# Patient Record
Sex: MALE | Race: OTHER | HISPANIC OR LATINO | URBAN - METROPOLITAN AREA
[De-identification: names, ages, dates, MRNs, and addresses within clinical notes are randomized per-mention and may not be internally consistent; named-entity substitution may affect disease eponyms.]

---

## 2021-09-22 ENCOUNTER — INPATIENT (INPATIENT)
Facility: HOSPITAL | Age: 56
LOS: 5 days | Discharge: HOME | DRG: 308 | End: 2021-09-28
Attending: HOSPITALIST | Admitting: STUDENT IN AN ORGANIZED HEALTH CARE EDUCATION/TRAINING PROGRAM
Payer: COMMERCIAL

## 2021-09-22 VITALS
RESPIRATION RATE: 18 BRPM | HEART RATE: 120 BPM | HEIGHT: 70 IN | DIASTOLIC BLOOD PRESSURE: 99 MMHG | TEMPERATURE: 98 F | SYSTOLIC BLOOD PRESSURE: 159 MMHG | OXYGEN SATURATION: 97 % | WEIGHT: 237 LBS

## 2021-09-22 LAB
ALBUMIN SERPL ELPH-MCNC: 3.8 G/DL — SIGNIFICANT CHANGE UP (ref 3.3–5)
ALP SERPL-CCNC: 122 U/L — HIGH (ref 40–120)
ALT FLD-CCNC: 41 U/L — SIGNIFICANT CHANGE UP (ref 10–45)
ANION GAP SERPL CALC-SCNC: 13 MMOL/L — SIGNIFICANT CHANGE UP (ref 5–17)
AST SERPL-CCNC: 30 U/L — SIGNIFICANT CHANGE UP (ref 10–40)
BASOPHILS # BLD AUTO: 0.03 K/UL — SIGNIFICANT CHANGE UP (ref 0–0.2)
BASOPHILS NFR BLD AUTO: 0.5 % — SIGNIFICANT CHANGE UP (ref 0–2)
BILIRUB SERPL-MCNC: 0.7 MG/DL — SIGNIFICANT CHANGE UP (ref 0.2–1.2)
BUN SERPL-MCNC: 20 MG/DL — SIGNIFICANT CHANGE UP (ref 7–23)
CALCIUM SERPL-MCNC: 8.8 MG/DL — SIGNIFICANT CHANGE UP (ref 8.4–10.5)
CHLORIDE SERPL-SCNC: 106 MMOL/L — SIGNIFICANT CHANGE UP (ref 96–108)
CO2 SERPL-SCNC: 22 MMOL/L — SIGNIFICANT CHANGE UP (ref 22–31)
CREAT SERPL-MCNC: 1.25 MG/DL — SIGNIFICANT CHANGE UP (ref 0.5–1.3)
D DIMER BLD IA.RAPID-MCNC: 652 NG/ML DDU — HIGH
EOSINOPHIL # BLD AUTO: 0.11 K/UL — SIGNIFICANT CHANGE UP (ref 0–0.5)
EOSINOPHIL NFR BLD AUTO: 1.7 % — SIGNIFICANT CHANGE UP (ref 0–6)
GLUCOSE SERPL-MCNC: 112 MG/DL — HIGH (ref 70–99)
HCT VFR BLD CALC: 45 % — SIGNIFICANT CHANGE UP (ref 39–50)
HGB BLD-MCNC: 14.4 G/DL — SIGNIFICANT CHANGE UP (ref 13–17)
IMM GRANULOCYTES NFR BLD AUTO: 0.3 % — SIGNIFICANT CHANGE UP (ref 0–1.5)
LIDOCAIN IGE QN: 31 U/L — SIGNIFICANT CHANGE UP (ref 7–60)
LYMPHOCYTES # BLD AUTO: 1.59 K/UL — SIGNIFICANT CHANGE UP (ref 1–3.3)
LYMPHOCYTES # BLD AUTO: 24.7 % — SIGNIFICANT CHANGE UP (ref 13–44)
MAGNESIUM SERPL-MCNC: 2 MG/DL — SIGNIFICANT CHANGE UP (ref 1.6–2.6)
MCHC RBC-ENTMCNC: 29.6 PG — SIGNIFICANT CHANGE UP (ref 27–34)
MCHC RBC-ENTMCNC: 32 GM/DL — SIGNIFICANT CHANGE UP (ref 32–36)
MCV RBC AUTO: 92.4 FL — SIGNIFICANT CHANGE UP (ref 80–100)
MONOCYTES # BLD AUTO: 0.44 K/UL — SIGNIFICANT CHANGE UP (ref 0–0.9)
MONOCYTES NFR BLD AUTO: 6.8 % — SIGNIFICANT CHANGE UP (ref 2–14)
NEUTROPHILS # BLD AUTO: 4.26 K/UL — SIGNIFICANT CHANGE UP (ref 1.8–7.4)
NEUTROPHILS NFR BLD AUTO: 66 % — SIGNIFICANT CHANGE UP (ref 43–77)
NRBC # BLD: 0 /100 WBCS — SIGNIFICANT CHANGE UP (ref 0–0)
NT-PROBNP SERPL-SCNC: 1636 PG/ML — HIGH (ref 0–300)
PLATELET # BLD AUTO: 249 K/UL — SIGNIFICANT CHANGE UP (ref 150–400)
POTASSIUM SERPL-MCNC: 3.9 MMOL/L — SIGNIFICANT CHANGE UP (ref 3.5–5.3)
POTASSIUM SERPL-SCNC: 3.9 MMOL/L — SIGNIFICANT CHANGE UP (ref 3.5–5.3)
PROT SERPL-MCNC: 6.3 G/DL — SIGNIFICANT CHANGE UP (ref 6–8.3)
RBC # BLD: 4.87 M/UL — SIGNIFICANT CHANGE UP (ref 4.2–5.8)
RBC # FLD: 14.2 % — SIGNIFICANT CHANGE UP (ref 10.3–14.5)
SARS-COV-2 RNA SPEC QL NAA+PROBE: SIGNIFICANT CHANGE UP
SODIUM SERPL-SCNC: 141 MMOL/L — SIGNIFICANT CHANGE UP (ref 135–145)
TROPONIN T, HIGH SENSITIVITY RESULT: 18 NG/L — SIGNIFICANT CHANGE UP (ref 0–51)
TROPONIN T, HIGH SENSITIVITY RESULT: 19 NG/L — SIGNIFICANT CHANGE UP (ref 0–51)
WBC # BLD: 6.45 K/UL — SIGNIFICANT CHANGE UP (ref 3.8–10.5)
WBC # FLD AUTO: 6.45 K/UL — SIGNIFICANT CHANGE UP (ref 3.8–10.5)

## 2021-09-22 PROCEDURE — 93010 ELECTROCARDIOGRAM REPORT: CPT | Mod: NC

## 2021-09-22 PROCEDURE — 99285 EMERGENCY DEPT VISIT HI MDM: CPT

## 2021-09-22 PROCEDURE — 71275 CT ANGIOGRAPHY CHEST: CPT | Mod: 26,MA

## 2021-09-22 PROCEDURE — 71046 X-RAY EXAM CHEST 2 VIEWS: CPT | Mod: 26

## 2021-09-22 RX ORDER — DILTIAZEM HCL 120 MG
20 CAPSULE, EXT RELEASE 24 HR ORAL ONCE
Refills: 0 | Status: COMPLETED | OUTPATIENT
Start: 2021-09-22 | End: 2021-09-22

## 2021-09-22 RX ORDER — ASPIRIN/CALCIUM CARB/MAGNESIUM 324 MG
324 TABLET ORAL ONCE
Refills: 0 | Status: COMPLETED | OUTPATIENT
Start: 2021-09-22 | End: 2021-09-22

## 2021-09-22 RX ORDER — DILTIAZEM HCL 120 MG
60 CAPSULE, EXT RELEASE 24 HR ORAL ONCE
Refills: 0 | Status: COMPLETED | OUTPATIENT
Start: 2021-09-22 | End: 2021-09-22

## 2021-09-22 RX ADMIN — Medication 324 MILLIGRAM(S): at 18:36

## 2021-09-22 RX ADMIN — Medication 20 MILLIGRAM(S): at 18:36

## 2021-09-22 RX ADMIN — Medication 60 MILLIGRAM(S): at 19:05

## 2021-09-22 NOTE — ED PROVIDER NOTE - OBJECTIVE STATEMENT
Attn- pt seen in [facility] - pt sent to ER by Lexi today. pt c/o palpitations intermittently with TAYLOR for 3 weeks.  no chest pain, no leg pain. no family hx of CAD.  pt smokes on weekends and drinks 12 pack of beer on weekends.  pt hasn't seen physician for many years.  no known pmhx.

## 2021-09-22 NOTE — ED PROVIDER NOTE - CLINICAL SUMMARY MEDICAL DECISION MAKING FREE TEXT BOX
Attn - new onset AFib - pt with c/o intermittent palp - poss paroxysmal.  for 3 weeks - D-Dimer poss PE vs rate related.  CXR, Trop, rate control, anticoag.

## 2021-09-22 NOTE — ED ADULT NURSE NOTE - OBJECTIVE STATEMENT
55yM presents to the ED from home with complaints of increased TAYLOR. No significant PMHx or daily medications (hasn't seen a physician in a while). [id number] of worsening TAYLOR and BL LE swelling. Denies any CP, N/V, abdominal pain, back pain, fevers/chills, numbness/tingling, HA, vision changes, diaphoresis. Pt seen at  and referred to ED for further eval. Upon arrival to ED, pt AAOx4, VS as documented. EKG completed showing new onset afib with rate in 120's. Spo2 97% on RA. 2IV's placed, labs drawn and sent, pt placed on cardiac and spo2 monitor. Medications given as per MD order. Pt lungs clear BL. Pt has no increased WOB, labored respirations, or retractions. Pt abdomen soft and nontender to palpation. Pt has + pulses in UE and LE BL. Pt has +2 edema of BL LE. Pt able to ambulate with steady gait. Bed locked in lowest position with appropriate side rails raised. Pt given call bell and explained call bell system. Pt aware of plan to await radiology and lab results with cardiac monitoring. Pt has no other questions or concerns at this time.

## 2021-09-22 NOTE — ED PROVIDER NOTE - PROGRESS NOTE DETAILS
Pt in AFib rate 70s-80s after cardizem, BP stable. pt well appearing. pending labwork and CXR. likely CDU vs admission for new onset afib - Ivan Bass PA-C Ezekiel Castorena, PGY-2: Pt reexamined at bedside, resting comfortably, vitals stable. Rate controlled, CT w b/l pleural effusions, no PE noted.

## 2021-09-22 NOTE — ED PROVIDER NOTE - PHYSICAL EXAMINATION
Attn - alert, NAD, Cardiac monitor - AFib 120-150s, no pallor or jaundice, PERRL 3 mm, moist mm, skin - warm and dry, Lungs - clear, no w/r/r, good BS bilaterally, Cor - irreg. irreg, no M, no rub, Abdo - obese, ND, soft, NT, no HSM, no CVAT, no guarding or rebound. Extremities - no edema, no calf tenderness, distal pulses intact and symmetrical, Neuro - intact and non-focal

## 2021-09-23 DIAGNOSIS — M79.89 OTHER SPECIFIED SOFT TISSUE DISORDERS: ICD-10-CM

## 2021-09-23 DIAGNOSIS — I10 ESSENTIAL (PRIMARY) HYPERTENSION: ICD-10-CM

## 2021-09-23 DIAGNOSIS — I48.91 UNSPECIFIED ATRIAL FIBRILLATION: ICD-10-CM

## 2021-09-23 DIAGNOSIS — Z29.9 ENCOUNTER FOR PROPHYLACTIC MEASURES, UNSPECIFIED: ICD-10-CM

## 2021-09-23 LAB
APTT BLD: 25.4 SEC — LOW (ref 27.5–35.5)
CHOLEST SERPL-MCNC: 134 MG/DL — SIGNIFICANT CHANGE UP
HDLC SERPL-MCNC: 36 MG/DL — LOW
INR BLD: 1.12 RATIO — SIGNIFICANT CHANGE UP (ref 0.88–1.16)
LIPID PNL WITH DIRECT LDL SERPL: 86 MG/DL — SIGNIFICANT CHANGE UP
MAGNESIUM SERPL-MCNC: 2 MG/DL — SIGNIFICANT CHANGE UP (ref 1.6–2.6)
NON HDL CHOLESTEROL: 98 MG/DL — SIGNIFICANT CHANGE UP
PHOSPHATE SERPL-MCNC: 4.1 MG/DL — SIGNIFICANT CHANGE UP (ref 2.5–4.5)
PROTHROM AB SERPL-ACNC: 13.4 SEC — SIGNIFICANT CHANGE UP (ref 10.6–13.6)
TRIGL SERPL-MCNC: 60 MG/DL — SIGNIFICANT CHANGE UP
TSH SERPL-MCNC: 2.09 UIU/ML — SIGNIFICANT CHANGE UP (ref 0.27–4.2)

## 2021-09-23 PROCEDURE — 99255 IP/OBS CONSLTJ NEW/EST HI 80: CPT

## 2021-09-23 PROCEDURE — 99223 1ST HOSP IP/OBS HIGH 75: CPT

## 2021-09-23 PROCEDURE — 12345: CPT | Mod: NC

## 2021-09-23 PROCEDURE — 93306 TTE W/DOPPLER COMPLETE: CPT | Mod: 26

## 2021-09-23 PROCEDURE — 93970 EXTREMITY STUDY: CPT | Mod: 26

## 2021-09-23 RX ORDER — METOPROLOL TARTRATE 50 MG
12.5 TABLET ORAL
Refills: 0 | Status: DISCONTINUED | OUTPATIENT
Start: 2021-09-23 | End: 2021-09-24

## 2021-09-23 RX ORDER — HEPARIN SODIUM 5000 [USP'U]/ML
5000 INJECTION INTRAVENOUS; SUBCUTANEOUS EVERY 8 HOURS
Refills: 0 | Status: DISCONTINUED | OUTPATIENT
Start: 2021-09-23 | End: 2021-09-24

## 2021-09-23 RX ORDER — ACETAMINOPHEN 500 MG
650 TABLET ORAL EVERY 6 HOURS
Refills: 0 | Status: DISCONTINUED | OUTPATIENT
Start: 2021-09-23 | End: 2021-09-28

## 2021-09-23 RX ORDER — FUROSEMIDE 40 MG
20 TABLET ORAL DAILY
Refills: 0 | Status: DISCONTINUED | OUTPATIENT
Start: 2021-09-23 | End: 2021-09-24

## 2021-09-23 RX ORDER — INFLUENZA VIRUS VACCINE 15; 15; 15; 15 UG/.5ML; UG/.5ML; UG/.5ML; UG/.5ML
0.5 SUSPENSION INTRAMUSCULAR ONCE
Refills: 0 | Status: COMPLETED | OUTPATIENT
Start: 2021-09-23 | End: 2021-09-23

## 2021-09-23 RX ADMIN — Medication 12.5 MILLIGRAM(S): at 05:40

## 2021-09-23 RX ADMIN — Medication 12.5 MILLIGRAM(S): at 17:48

## 2021-09-23 RX ADMIN — HEPARIN SODIUM 5000 UNIT(S): 5000 INJECTION INTRAVENOUS; SUBCUTANEOUS at 05:39

## 2021-09-23 RX ADMIN — HEPARIN SODIUM 5000 UNIT(S): 5000 INJECTION INTRAVENOUS; SUBCUTANEOUS at 13:38

## 2021-09-23 RX ADMIN — Medication 20 MILLIGRAM(S): at 05:40

## 2021-09-23 RX ADMIN — HEPARIN SODIUM 5000 UNIT(S): 5000 INJECTION INTRAVENOUS; SUBCUTANEOUS at 22:09

## 2021-09-23 NOTE — H&P ADULT - NSHPLABSRESULTS_GEN_ALL_CORE
14.4   6.45  )-----------( 249      ( 22 Sep 2021 18:40 )             45.0       09-22    141  |  106  |  20  ----------------------------<  112<H>  3.9   |  22  |  1.25    Ca    8.8      22 Sep 2021 18:40  Mg     2.0     09-22    TPro  6.3  /  Alb  3.8  /  TBili  0.7  /  DBili  x   /  AST  30  /  ALT  41  /  AlkPhos  122<H>  09-22            LIVER FUNCTIONS - ( 22 Sep 2021 18:40 )  Alb: 3.8 g/dL / Pro: 6.3 g/dL / ALK PHOS: 122 U/L / ALT: 41 U/L / AST: 30 U/L / GGT: x           CT Angio chest  IMPRESSION:  No pulmonary embolism in the main pulmonary arteries. Small b/l pleural effusions    CXR:  Small  bilateral right greater than left pleural effusions.

## 2021-09-23 NOTE — H&P ADULT - PROBLEM SELECTOR PLAN 3
-Diet: NPO  -DVT:HSQ  -Code: full code -Diet: NPO for possible DCCV  -DVT:HSQ  -Code: full code -monitor while starting metoprolol  -f/u lipid panel and A1C for possible metabolic syndrome

## 2021-09-23 NOTE — H&P ADULT - PROBLEM SELECTOR PLAN 2
-monitor while starting metoprolol  -f/u lipid panel and A1C for possible metabolic syndrome -TTE to r/o CHF  -trial of lasix PO qd  -f/u doppler studies

## 2021-09-23 NOTE — H&P ADULT - ASSESSMENT
55M no PMH presenting with progressive dyspnea x 2-weeks with LE edema. On presentation found to have new-onset A.fib with HR-120s. Exam notable for obese male. mildly diminished breath sounds in Left posterior lower lobe and b/l 2+ pitting edema. CTA neg for PE but showing mild b/l pleural effusions. Labs sig for elevated BNP. Admitted for new-onset a.fibrillation and possible CHF

## 2021-09-23 NOTE — PROGRESS NOTE ADULT - SUBJECTIVE AND OBJECTIVE BOX
[facility] Division of Hospital Medicine  Megan Salinas MD  Pager (CHRIS-F, 8A-5P): [phone]  Other Times:  [phone]    Patient is a 55y old  Male who presents with a chief complaint of worsening dyspnea. LE swelling (23 Sep 2021 03:08)      SUBJECTIVE / OVERNIGHT EVENTS:    Patient was examined this morning. Currently resting comfortably in stretcher, no acute complaint. No headache, dizziness, chest pain, palpitations, nausea, dyspnea. Reports that he has had mild lower extremity swelling for a "long time" and currently his legs looks much better since receiving the diuretic has urinated quite a bit. No pain/numbness/tingling in extremities.     ADDITIONAL REVIEW OF SYSTEMS: rest of 14 point ROS neg    MEDICATIONS  (STANDING):  furosemide    Tablet 20 milliGRAM(s) Oral daily  heparin   Injectable 5000 Unit(s) SubCutaneous every 8 hours  metoprolol tartrate 12.5 milliGRAM(s) Oral two times a day    MEDICATIONS  (PRN):  acetaminophen   Tablet .. 650 milliGRAM(s) Oral every 6 hours PRN Temp greater or equal to 38.5C (101.3F), Mild Pain (1 - 3)      CAPILLARY BLOOD GLUCOSE        I&O's Summary      PHYSICAL EXAM:  Vital Signs Last 24 Hrs  T(C): 36.9 (23 Sep 2021 11:32), Max: 36.9 (23 Sep 2021 11:32)  T(F): 98.4 (23 Sep 2021 11:32), Max: 98.4 (23 Sep 2021 11:32)  HR: 100 (23 Sep 2021 11:32) (70 - 134)  BP: 148/90 (23 Sep 2021 11:32) (121/94 - 162/107)  BP(mean): 100 (23 Sep 2021 00:00) (100 - 123)  RR: 18 (23 Sep 2021 11:32) (18 - 19)  SpO2: 96% (23 Sep 2021 11:32) (96% - 99%)      CONSTITUTIONAL: NAD, well-developed, well-groomed  EYES: PERRL; conjunctiva and sclera clear  ENMT: Moist oral mucosa,   NECK: Supple, no palpable masses; no JVD  RESPIRATORY: Normal respiratory effort; lungs are clear to auscultation bilaterally  CARDIOVASCULAR: irregular RR, no murmur/rub/gallop; mild pedal edema BL; Peripheral pulses are 2+ bilaterally  ABDOMEN: Nontender to palpation, normoactive bowel sounds, no rebound/guarding;   MUSCULOSKELETAL:  Normal gait; no clubbing or cyanosis of digits; no joint swelling or tenderness to palpation  PSYCH: A+O to person, place, and time; affect appropriate  NEUROLOGY: CN 2-12 are intact and symmetric; no gross sensory/motor deficits   SKIN: No rashes; no palpable lesions        LABS:                        14.4   6.45  )-----------( 249      ( 22 Sep 2021 18:40 )             45.0     09-22    141  |  106  |  20  ----------------------------<  112<H>  3.9   |  22  |  1.25    Ca    8.8      22 Sep 2021 18:40  Phos  4.1     09-23  Mg     2.0     09-23    TPro  6.3  /  Alb  3.8  /  TBili  0.7  /  DBili  x   /  AST  30  /  ALT  41  /  AlkPhos  122<H>  09-22    PT/INR - ( 23 Sep 2021 06:29 )   PT: 13.4 sec;   INR: 1.12 ratio         PTT - ( 23 Sep 2021 06:29 )  PTT:25.4 sec            RADIOLOGY & ADDITIONAL TESTS:  Results Reviewed:   Imaging Personally Reviewed:  Electrocardiogram Personally Reviewed:    COORDINATION OF CARE:  Care Discussed with Consultants/Other Providers [Y/N]:  Prior or Outpatient Records Reviewed [Y/N]:

## 2021-09-23 NOTE — H&P ADULT - HISTORY OF PRESENT ILLNESS
ED course: diltiazem x2 55M no PMH presenting with progressive dyspnea x 2-weeks with LE edema. States he has also had significant decreased ET. He denies orthopnea, PND. He has not seen a doctor in years and called his fathers doctor who advised him to go to the ED once his symptoms got really bad. Denies lightheadedness, CP, abdominal pain, melena/hematochezia, recent travel or long-term hospitalization, h/o asthma/COPD.    ED course: HDS, HR:120s, EKG revealing A.fib, given diltiazem PO and IV  55M no PMH presenting with progressive dyspnea x 2-weeks with LE edema. States he has also had significant decreased ET. He denies orthopnea, PND. He has not seen a doctor in years and called his fathers doctor who advised him to go to the ED once his symptoms got really bad. Denies lightheadedness, CP, abdominal pain, melena/hematochezia, recent travel or long-term hospitalization, h/o asthma/COPD.    ED course: HDS, HR:120s, EKG revealing A.fib, given diltiazem PO and IV .CTA neg for PE

## 2021-09-23 NOTE — PROGRESS NOTE ADULT - PROBLEM SELECTOR PLAN 1
-likely persistent as pt having sxs going on 2-weeks  -s/p dilt PO and IV in ED; currently rate-controlled  -continue Metoprolol 12.5mg BID   -DCCV if not rate-controlled on meds but may require GENE to r/o thrombus  -cards actively following, recs appreciated  -tele monitoring   -TSH 2.09  -f/u TTE to r/o valvular/structural heart disease  -determine need for AC-will discuss with cardiology  -trial of lasix PO 20mg qd -likely persistent as pt having sxs going on 2-weeks  -s/p dilt PO and IV in ED; currently rate-controlled  -continue Metoprolol 12.5mg BID   -DCCV if not rate-controlled on meds but may require GENE to r/o thrombus  -cards actively following, recs appreciated  -tele monitoring   -TSH 2.09  -f/u TTE to r/o valvular/structural heart disease  -determine need for AC-will discuss with cardiology  -trial of lasix PO 20mg qd    UPDATE: case discussed with Dr. Hummel. Plan for TTE and GENE to be followed by likely DCCV tomorrow. Dr. Hummel will reach out to EP team. Can resume diet for now. NPO after MN. -likely persistent as pt having sxs going on 2-weeks  -s/p dilt PO and IV in ED; currently rate-controlled  -continue Metoprolol 12.5mg BID   -DCCV if not rate-controlled on meds but may require GENE to r/o thrombus  -cards actively following, recs appreciated  -tele monitoring   -TSH 2.09  -f/u TTE to r/o valvular/structural heart disease  -determine need for AC-will discuss with cardiology  -trial of lasix PO 20mg qd    UPDATE: case discussed with Dr. Hummel. Plan for TTE and GENE to be followed by likely DCCV tomorrow. Dr. Hummel will reach out to EP team. Can resume diet for now. NPO after MN. Discussed with GRACE Ernandez

## 2021-09-23 NOTE — CONSULT NOTE ADULT - SUBJECTIVE AND OBJECTIVE BOX
Patient seen and evaluated at bedside    Chief Complaint:    HPI:    56 yo M w/ no significant PMH who presents with SOB on exertion. Said for the past week, he has been having dyspnea on exertion which improves with rest. Also with intermittent palpitations. Denies any chest pain, leg pain, swelling, SOB at rest, lightheadedness, dizziness, syncope. He denies any cardiac hx or family hx of cardiac issues. Denies any cardiac surgery. Denies smoking, socially drinks, denies drug use.     EKG shows Afib in RVR, no ST changes or T wave inversions.     HR in ED showed 120-130s, HDS. Given dilt 60 PO and dilt 20mg IV, along with aspirin 324mg.       PMHx:   No pertinent past medical history        PSHx:       Allergies:  No Known Allergies      Home Meds:    Current Medications:       FAMILY HISTORY:      Social History:  Smoking History:  Alcohol Use:  Drug Use:    REVIEW OF SYSTEMS:  Constitutional:     [x ] negative [ ] fevers [ ] chills [ ] weight loss [ ] weight gain  HEENT:                  [x ] negative [ ] dry eyes [ ] eye irritation [ ] postnasal drip [ ] nasal congestion  CV:                         [ x] negative  [ ] chest pain [ ] orthopnea [ ] palpitations [ ] murmur  Resp:                     [x ] negative [ ] cough [ ] shortness of breath [ ] dyspnea [ ] wheezing [ ] sputum [ ]hemoptysis  GI:                          [ x] negative [ ] nausea [ ] vomiting [ ] diarrhea [ ] constipation [ ] abd pain [ ] dysphagia   :                        [ x] negative [ ] dysuria [ ] nocturia [ ] hematuria [ ] increased urinary frequency  Musculoskeletal: [x ] negative [ ] back pain [ ] myalgias [ ] arthralgias [ ] fracture  Skin:                       [ x] negative [ ] rash [ ] itch  Neurological:        [ x] negative [ ] headache [ ] dizziness [ ] syncope [ ] weakness [ ] numbness  Psychiatric:           [ x] negative [ ] anxiety [ ] depression  Endocrine:            [ x] negative [ ] diabetes [ ] thyroid problem  Heme/Lymph:      [ x] negative [ ] anemia [ ] bleeding problem  Allergic/Immune: [ x] negative [ ] itchy eyes [ ] nasal discharge [ ] hives [ ] angioedema    [ x] All other systems negative  [ ] Unable to assess ROS due to      Physical Exam:  T(F): 97.9 (09-23), Max: 98.3 (09-22)  HR: 78 (09-23) (78 - 134)  BP: 124/88 (09-23) (121/94 - 162/107)  RR: 18 (09-23)  SpO2: 99% (09-23)  GENERAL: No acute distress, well-developed  HEAD:  Atraumatic, Normocephalic  ENT: No JVD  CHEST/LUNG: Clear to auscultation bilaterally; No wheeze, equal breath sounds bilaterally   HEART: Regular rate and irregular rhythm; No murmurs, rubs, or gallops  ABDOMEN: Soft, Nontender, Nondistended   EXTREMITIES:  No clubbing, cyanosis, or edema  PSYCH: Nl behavior, nl affect  NEUROLOGY: AAOx3, non-focal       Cardiovascular Diagnostic Testing:    ECG: Personally reviewed:    Echo: Personally reviewed:    Stress Testing:    Cath:    Imaging:    CXR: Personally reviewed    Labs: Personally reviewed                        14.4   6.45  )-----------( 249      ( 22 Sep 2021 18:40 )             45.0     09-22    141  |  106  |  20  ----------------------------<  112<H>  3.9   |  22  |  1.25    Ca    8.8      22 Sep 2021 18:40  Mg     2.0     09-22    TPro  6.3  /  Alb  3.8  /  TBili  0.7  /  DBili  x   /  AST  30  /  ALT  41  /  AlkPhos  122<H>  09-22      Serum Pro-Brain Natriuretic Peptide: 1636 pg/mL (09-22 @ 18:40)         Patient seen and evaluated at bedside    Chief Complaint:    HPI:    56 yo M w/ no significant PMH who presents with SOB on exertion. Said for the past week, he has been having dyspnea on exertion which improves with rest. Also with intermittent palpitations. Denies any chest pain, leg pain, swelling, SOB at rest, lightheadedness, dizziness, syncope. He denies any cardiac hx or family hx of cardiac issues. Denies any cardiac surgery. Denies smoking, socially drinks, denies drug use.     EKG shows Afib in RVR, no ST changes or T wave inversions.     HR in ED showed 120-130s, HDS. Given dilt 60 PO and dilt 20mg IV, along with aspirin 324mg.     PMHx:   No pertinent past medical history        PSHx:       Allergies:  No Known Allergies      Home Meds:    Current Medications:       FAMILY HISTORY:      Social History:  Smoking History:  Alcohol Use:  Drug Use:    REVIEW OF SYSTEMS:  Constitutional:     [x ] negative [ ] fevers [ ] chills [ ] weight loss [ ] weight gain  HEENT:                  [x ] negative [ ] dry eyes [ ] eye irritation [ ] postnasal drip [ ] nasal congestion  CV:                         [ x] negative  [ ] chest pain [ ] orthopnea [ ] palpitations [ ] murmur  Resp:                     [x ] negative [ ] cough [ ] shortness of breath [ ] dyspnea [ ] wheezing [ ] sputum [ ]hemoptysis  GI:                          [ x] negative [ ] nausea [ ] vomiting [ ] diarrhea [ ] constipation [ ] abd pain [ ] dysphagia   :                        [ x] negative [ ] dysuria [ ] nocturia [ ] hematuria [ ] increased urinary frequency  Musculoskeletal: [x ] negative [ ] back pain [ ] myalgias [ ] arthralgias [ ] fracture  Skin:                       [ x] negative [ ] rash [ ] itch  Neurological:        [ x] negative [ ] headache [ ] dizziness [ ] syncope [ ] weakness [ ] numbness  Psychiatric:           [ x] negative [ ] anxiety [ ] depression  Endocrine:            [ x] negative [ ] diabetes [ ] thyroid problem  Heme/Lymph:      [ x] negative [ ] anemia [ ] bleeding problem  Allergic/Immune: [ x] negative [ ] itchy eyes [ ] nasal discharge [ ] hives [ ] angioedema    [ x] All other systems negative  [ ] Unable to assess ROS due to      Physical Exam:  T(F): 97.9 (09-23), Max: 98.3 (09-22)  HR: 78 (09-23) (78 - 134)  BP: 124/88 (09-23) (121/94 - 162/107)  RR: 18 (09-23)  SpO2: 99% (09-23)  GENERAL: No acute distress, well-developed  HEAD:  Atraumatic, Normocephalic  ENT: No JVD  CHEST/LUNG: Clear to auscultation bilaterally; No wheeze, equal breath sounds bilaterally   HEART: Regular rate and irregular rhythm; No murmurs, rubs, or gallops  ABDOMEN: Soft, Nontender, Nondistended   EXTREMITIES:  No clubbing, cyanosis, or edema  PSYCH: Nl behavior, nl affect  NEUROLOGY: AAOx3, non-focal       Cardiovascular Diagnostic Testing:    ECG: Personally reviewed:    Echo: Personally reviewed:    Stress Testing:    Cath:    Imaging:    CXR: Personally reviewed    Labs: Personally reviewed                        14.4   6.45  )-----------( 249      ( 22 Sep 2021 18:40 )             45.0     09-22    141  |  106  |  20  ----------------------------<  112<H>  3.9   |  22  |  1.25    Ca    8.8      22 Sep 2021 18:40  Mg     2.0     09-22    TPro  6.3  /  Alb  3.8  /  TBili  0.7  /  DBili  x   /  AST  30  /  ALT  41  /  AlkPhos  122<H>  09-22      Serum Pro-Brain Natriuretic Peptide: 1636 pg/mL (09-22 @ 18:40)

## 2021-09-23 NOTE — CONSULT NOTE ADULT - ATTENDING COMMENTS
55 year old male with no significant past medical history of presenting with atrial fibrillation with RVR. Started on diltiazem PO/IV with adequate control; goal <110. CT PE negative. ECHO completed evening with severe LV dysfunction and transitioned to PO metoprolol; increase dose to 25 mg BID and up-titrate. Start lisinopril 5 mg daily. Continue diuretics PO furosemide 40 mg daily. pBNP ~1600. Ischemic workup pending once further optimized. If he does not spontaneous convert, may consider GENE/DCCV.     Estephanie Hummel MD, MPH, JOSIE, RPVI, FACC  Inpatient Cardiovascular Specialist; [facility], [facility] ([facility])  ; [employer] [facility]  cell: [phone] (text preferred and/or call)  teams: Estephanie Hummel (text preferred and/or call)  email: [email or web address]    For all [facility] Cardiology and Cardiovascular Surgery on-service contact/call information, go to [email or web address] and use [email or web address]" to login.  For outpatient Cardiology appointments, call  [phone] to arrange with a colleague; I do not have outpatient Cardiology clinic.

## 2021-09-23 NOTE — H&P ADULT - PROBLEM SELECTOR PLAN 1
-NPO for DCCV  -cards actively following, recs appreciated  -tele monitoring  -start Metoprolol 12.5mg BID   -f/u TSH  -f/u TTE -likely persistent as pt having sxs going on 2-weeks  -s/p dilt PO and IV in ED;currently rate-controlled  -will start Metoprolol 12.5mg BID   -DCCV if not rate-controlled on meds  -cards actively following, recs appreciated  -tele monitoring   -f/u TSH to r/o hyperthyroidism  -f/u TTE to r/o valvular/structural heart disease  -determine need for AC with CHADsVASC; f/u A1C  -trial of lasix PO 20mg qd -likely persistent as pt having sxs going on 2-weeks  -s/p dilt PO and IV in ED;currently rate-controlled  -will start Metoprolol 12.5mg BID   -DCCV if not rate-controlled on meds but may require GENE to r/o thrombus  -cards actively following, recs appreciated  -tele monitoring   -f/u TSH to r/o hyperthyroidism  -f/u TTE to r/o valvular/structural heart disease  -determine need for AC with CHADsVASC; f/u A1C  -trial of lasix PO 20mg qd

## 2021-09-24 DIAGNOSIS — I50.21 ACUTE SYSTOLIC (CONGESTIVE) HEART FAILURE: ICD-10-CM

## 2021-09-24 DIAGNOSIS — I48.19 OTHER PERSISTENT ATRIAL FIBRILLATION: ICD-10-CM

## 2021-09-24 LAB
ANION GAP SERPL CALC-SCNC: 16 MMOL/L — SIGNIFICANT CHANGE UP (ref 5–17)
APTT BLD: 25.2 SEC — LOW (ref 27.5–35.5)
BUN SERPL-MCNC: 20 MG/DL — SIGNIFICANT CHANGE UP (ref 7–23)
CALCIUM SERPL-MCNC: 8.6 MG/DL — SIGNIFICANT CHANGE UP (ref 8.4–10.5)
CHLORIDE SERPL-SCNC: 105 MMOL/L — SIGNIFICANT CHANGE UP (ref 96–108)
CO2 SERPL-SCNC: 22 MMOL/L — SIGNIFICANT CHANGE UP (ref 22–31)
COVID-19 SPIKE DOMAIN AB INTERP: NEGATIVE — SIGNIFICANT CHANGE UP
COVID-19 SPIKE DOMAIN ANTIBODY RESULT: 0.4 U/ML — SIGNIFICANT CHANGE UP
CREAT SERPL-MCNC: 1.01 MG/DL — SIGNIFICANT CHANGE UP (ref 0.5–1.3)
GLUCOSE SERPL-MCNC: 102 MG/DL — HIGH (ref 70–99)
HCT VFR BLD CALC: 41.5 % — SIGNIFICANT CHANGE UP (ref 39–50)
HGB BLD-MCNC: 13.5 G/DL — SIGNIFICANT CHANGE UP (ref 13–17)
INR BLD: 1.09 RATIO — SIGNIFICANT CHANGE UP (ref 0.88–1.16)
MAGNESIUM SERPL-MCNC: 2 MG/DL — SIGNIFICANT CHANGE UP (ref 1.6–2.6)
MCHC RBC-ENTMCNC: 29.9 PG — SIGNIFICANT CHANGE UP (ref 27–34)
MCHC RBC-ENTMCNC: 32.5 GM/DL — SIGNIFICANT CHANGE UP (ref 32–36)
MCV RBC AUTO: 91.8 FL — SIGNIFICANT CHANGE UP (ref 80–100)
NRBC # BLD: 0 /100 WBCS — SIGNIFICANT CHANGE UP (ref 0–0)
PLATELET # BLD AUTO: 231 K/UL — SIGNIFICANT CHANGE UP (ref 150–400)
POTASSIUM SERPL-MCNC: 4 MMOL/L — SIGNIFICANT CHANGE UP (ref 3.5–5.3)
POTASSIUM SERPL-SCNC: 4 MMOL/L — SIGNIFICANT CHANGE UP (ref 3.5–5.3)
PROTHROM AB SERPL-ACNC: 13 SEC — SIGNIFICANT CHANGE UP (ref 10.6–13.6)
RBC # BLD: 4.52 M/UL — SIGNIFICANT CHANGE UP (ref 4.2–5.8)
RBC # FLD: 14.3 % — SIGNIFICANT CHANGE UP (ref 10.3–14.5)
SARS-COV-2 IGG+IGM SERPL QL IA: 0.4 U/ML — SIGNIFICANT CHANGE UP
SARS-COV-2 IGG+IGM SERPL QL IA: NEGATIVE — SIGNIFICANT CHANGE UP
SODIUM SERPL-SCNC: 143 MMOL/L — SIGNIFICANT CHANGE UP (ref 135–145)
WBC # BLD: 5.51 K/UL — SIGNIFICANT CHANGE UP (ref 3.8–10.5)
WBC # FLD AUTO: 5.51 K/UL — SIGNIFICANT CHANGE UP (ref 3.8–10.5)

## 2021-09-24 PROCEDURE — 99233 SBSQ HOSP IP/OBS HIGH 50: CPT

## 2021-09-24 RX ORDER — ENOXAPARIN SODIUM 100 MG/ML
40 INJECTION SUBCUTANEOUS DAILY
Refills: 0 | Status: DISCONTINUED | OUTPATIENT
Start: 2021-09-24 | End: 2021-09-27

## 2021-09-24 RX ORDER — ASPIRIN/CALCIUM CARB/MAGNESIUM 324 MG
325 TABLET ORAL DAILY
Refills: 0 | Status: DISCONTINUED | OUTPATIENT
Start: 2021-09-24 | End: 2021-09-27

## 2021-09-24 RX ORDER — FUROSEMIDE 40 MG
40 TABLET ORAL DAILY
Refills: 0 | Status: DISCONTINUED | OUTPATIENT
Start: 2021-09-24 | End: 2021-09-28

## 2021-09-24 RX ORDER — METOPROLOL TARTRATE 50 MG
25 TABLET ORAL
Refills: 0 | Status: DISCONTINUED | OUTPATIENT
Start: 2021-09-24 | End: 2021-09-25

## 2021-09-24 RX ORDER — LISINOPRIL 2.5 MG/1
5 TABLET ORAL DAILY
Refills: 0 | Status: DISCONTINUED | OUTPATIENT
Start: 2021-09-24 | End: 2021-09-25

## 2021-09-24 RX ADMIN — Medication 12.5 MILLIGRAM(S): at 05:27

## 2021-09-24 RX ADMIN — Medication 20 MILLIGRAM(S): at 05:27

## 2021-09-24 RX ADMIN — Medication 25 MILLIGRAM(S): at 17:27

## 2021-09-24 RX ADMIN — ENOXAPARIN SODIUM 40 MILLIGRAM(S): 100 INJECTION SUBCUTANEOUS at 11:20

## 2021-09-24 RX ADMIN — HEPARIN SODIUM 5000 UNIT(S): 5000 INJECTION INTRAVENOUS; SUBCUTANEOUS at 05:27

## 2021-09-24 RX ADMIN — Medication 325 MILLIGRAM(S): at 11:49

## 2021-09-24 RX ADMIN — LISINOPRIL 5 MILLIGRAM(S): 2.5 TABLET ORAL at 14:04

## 2021-09-24 RX ADMIN — Medication 40 MILLIGRAM(S): at 14:04

## 2021-09-24 NOTE — PROGRESS NOTE ADULT - ASSESSMENT
55M no PMH presenting with progressive dyspnea x 2-weeks with LE edema. On presentation found to have new-onset A.fib with HR-120s. Patient also found to have new onset HFrEF.

## 2021-09-24 NOTE — PROGRESS NOTE ADULT - SUBJECTIVE AND OBJECTIVE BOX
Patient is a 55y old  Male who presents with a chief complaint of worsening dyspnea. LE swelling (24 Sep 2021 10:58)      SUBJECTIVE / OVERNIGHT EVENTS: Patient seen and examined at bedside. No acute events overnight. He denies chest pain or shortness of breath. He denies palpitations. He states lower extremity swelling is improved. On telemetry patient with HR between  in afib. Patient apparently pending DCCV today.    REVIEW OF SYSTEMS: 10 point ROS negative except as above    MEDICATIONS  (STANDING):  enoxaparin Injectable 40 milliGRAM(s) SubCutaneous daily  furosemide    Tablet 20 milliGRAM(s) Oral daily  influenza   Vaccine 0.5 milliLiter(s) IntraMuscular once  metoprolol tartrate 12.5 milliGRAM(s) Oral two times a day    MEDICATIONS  (PRN):  acetaminophen   Tablet .. 650 milliGRAM(s) Oral every 6 hours PRN Temp greater or equal to 38.5C (101.3F), Mild Pain (1 - 3)    CAPILLARY BLOOD GLUCOSE      I&O's Summary      PHYSICAL EXAM:  Vital Signs Last 24 Hrs  T(C): 36.7 (24 Sep 2021 04:28), Max: 36.9 (23 Sep 2021 11:32)  T(F): 98.1 (24 Sep 2021 04:28), Max: 98.4 (23 Sep 2021 11:32)  HR: 93 (24 Sep 2021 04:28) (93 - 106)  BP: 142/99 (24 Sep 2021 04:28) (134/97 - 148/90)  BP(mean): --  RR: 18 (24 Sep 2021 03:42) (18 - 18)  SpO2: 96% (24 Sep 2021 04:28) (94% - 96%)    GEN: male in NAD, appears comfortable, no diaphoresis  EYES: No scleral injection, PERRL, EOMI  ENTM: neck supple & symmetric without tracheal deviation, moist membranes, no gross hearing impairment, thyroid gland not enlarged  CV: +S1/S2, no m/r/g, no abdominal bruit, no LE edema  RESP: breathing comfortably, no respiratory accessory muscle use, CTAB, no w/r/r  GI: normoactive BS, soft, NTND, no rebounding/guarding, no palpable masses    LABS:                        13.5   5.51  )-----------( 231      ( 24 Sep 2021 07:00 )             41.5     09-24    143  |  105  |  20  ----------------------------<  102<H>  4.0   |  22  |  1.01    Ca    8.6      24 Sep 2021 07:00  Phos  4.1     09-23  Mg     2.0     09-24    TPro  6.3  /  Alb  3.8  /  TBili  0.7  /  DBili  x   /  AST  30  /  ALT  41  /  AlkPhos  122<H>  09-22    PT/INR - ( 24 Sep 2021 07:00 )   PT: 13.0 sec;   INR: 1.09 ratio         PTT - ( 24 Sep 2021 07:00 )  PTT:25.2 sec            RADIOLOGY & ADDITIONAL TESTS:  Results Reviewed:   Imaging Personally Reviewed:  Electrocardiogram Personally Reviewed:    COORDINATION OF CARE:  Care Discussed with Consultants/Other Providers [Y/N]:  Prior or Outpatient Records Reviewed [Y/N]:

## 2021-09-24 NOTE — PROGRESS NOTE ADULT - PROBLEM SELECTOR PLAN 1
-Cardiology planning to perform DCCV  -Rate control appears adequate on metoprolol  -CHADSVASC is 1, will likely benefit from Aspirin 325 mg daily, will start soon  -Tele monitoring -Rate control appears adequate on metoprolol  -CHADSVASC is 1, will likely benefit from Aspirin 325 mg daily, will start soon  -Tele monitoring -Rate control appears adequate on metoprolol  -CHADSVASC is 1, will likely benefit from Aspirin 325 mg daily so will start  -Tele monitoring

## 2021-09-24 NOTE — PROGRESS NOTE ADULT - PROBLEM SELECTOR PLAN 2
-Likely new onset 2/2 to atrial fibrillation, may require ischemic work up in the future  -Lasix 20 mg PO (seems to be responding well to it)  -May start ARB or Entresto soon  -Discharge on Succinate for mortality benefit  -Outpatient cardiology follow up to see if EF recovers with GDMT -Likely new onset 2/2 to atrial fibrillation, may require ischemic work up in the future  -Lasix 40 mg PO (seems to be responding well to it)  -Start Lisinopril per cards  -Discharge on Succinate for mortality benefit  -Outpatient cardiology follow up to see if EF recovers with GDMT

## 2021-09-24 NOTE — PROGRESS NOTE ADULT - PROBLEM SELECTOR PLAN 3
-Diet: NPO for possible DCCV  -DVT: Lovenox  -Code: full code    Dispo: no needs, likely 9/25-9/26 -Diet: Regular  -DVT: Lovenox  -Code: full code    Dispo: no needs, likely 9/25-9/26

## 2021-09-25 LAB
ANION GAP SERPL CALC-SCNC: 12 MMOL/L — SIGNIFICANT CHANGE UP (ref 5–17)
BUN SERPL-MCNC: 16 MG/DL — SIGNIFICANT CHANGE UP (ref 7–23)
CALCIUM SERPL-MCNC: 8.4 MG/DL — SIGNIFICANT CHANGE UP (ref 8.4–10.5)
CHLORIDE SERPL-SCNC: 102 MMOL/L — SIGNIFICANT CHANGE UP (ref 96–108)
CO2 SERPL-SCNC: 25 MMOL/L — SIGNIFICANT CHANGE UP (ref 22–31)
CREAT SERPL-MCNC: 0.84 MG/DL — SIGNIFICANT CHANGE UP (ref 0.5–1.3)
GLUCOSE SERPL-MCNC: 96 MG/DL — SIGNIFICANT CHANGE UP (ref 70–99)
MAGNESIUM SERPL-MCNC: 2 MG/DL — SIGNIFICANT CHANGE UP (ref 1.6–2.6)
POTASSIUM SERPL-MCNC: 3.6 MMOL/L — SIGNIFICANT CHANGE UP (ref 3.5–5.3)
POTASSIUM SERPL-SCNC: 3.6 MMOL/L — SIGNIFICANT CHANGE UP (ref 3.5–5.3)
SODIUM SERPL-SCNC: 139 MMOL/L — SIGNIFICANT CHANGE UP (ref 135–145)

## 2021-09-25 PROCEDURE — 99232 SBSQ HOSP IP/OBS MODERATE 35: CPT

## 2021-09-25 RX ORDER — LISINOPRIL 2.5 MG/1
10 TABLET ORAL DAILY
Refills: 0 | Status: DISCONTINUED | OUTPATIENT
Start: 2021-09-25 | End: 2021-09-28

## 2021-09-25 RX ORDER — METOPROLOL TARTRATE 50 MG
50 TABLET ORAL
Refills: 0 | Status: DISCONTINUED | OUTPATIENT
Start: 2021-09-25 | End: 2021-09-28

## 2021-09-25 RX ADMIN — LISINOPRIL 5 MILLIGRAM(S): 2.5 TABLET ORAL at 06:21

## 2021-09-25 RX ADMIN — ENOXAPARIN SODIUM 40 MILLIGRAM(S): 100 INJECTION SUBCUTANEOUS at 11:47

## 2021-09-25 RX ADMIN — Medication 325 MILLIGRAM(S): at 11:47

## 2021-09-25 RX ADMIN — Medication 40 MILLIGRAM(S): at 06:21

## 2021-09-25 RX ADMIN — Medication 25 MILLIGRAM(S): at 06:21

## 2021-09-25 RX ADMIN — Medication 50 MILLIGRAM(S): at 17:51

## 2021-09-25 NOTE — PROGRESS NOTE ADULT - PROBLEM SELECTOR PLAN 1
-Rate control appears adequate on metoprolol  -CHADSVASC is 1, will likely benefit from Aspirin 325 mg daily so will start  -Tele monitoring  - many need cardioversion -Rate control appears adequate on metoprolol  -CHADSVASC is 2, will likely benefit anticoagulation. I d/w him about the need for anticoagulation, he is accepting of it.  -Tele monitoring  - many need cardioversion

## 2021-09-25 NOTE — PROGRESS NOTE ADULT - PROBLEM SELECTOR PLAN 2
-Likely new onset 2/2 to atrial fibrillation, may require ischemic work up in the future  -Lasix 40 mg PO (seems to be responding well to it)  -Started on Lisinopril 5mg  -Started on lopressor 25 mg BID -Likely new onset 2/2 to atrial fibrillation, may require ischemic work up in the future  -Lasix 40 mg PO (seems to be responding well to it)  -increase Lisinopril 10 mg daily  -increase lopressor to 50 mg BID  - I d/w Dr. Hummel --> pt will need a GENE with cardioversion

## 2021-09-25 NOTE — PROGRESS NOTE ADULT - SUBJECTIVE AND OBJECTIVE BOX
Andre Reyes, M.D.  Pager: [phone]  Office: [phone]    Patient is a 55y old  Male who presents with a chief complaint of worsening dyspnea. LE swelling (24 Sep 2021 10:58)          SUBJECTIVE / OVERNIGHT EVENTS:    No acute overnight events.    ROS: (  ) Fever, (  )Chills,  (  )Nausea/Vomiting, (  ) Cough, (  )Shortness of breath, (  )Chest Pain    MEDICATIONS  (STANDING):  aspirin 325 milliGRAM(s) Oral daily  enoxaparin Injectable 40 milliGRAM(s) SubCutaneous daily  furosemide    Tablet 40 milliGRAM(s) Oral daily  influenza   Vaccine 0.5 milliLiter(s) IntraMuscular once  lisinopril 5 milliGRAM(s) Oral daily  metoprolol tartrate 25 milliGRAM(s) Oral two times a day    MEDICATIONS  (PRN):  acetaminophen   Tablet .. 650 milliGRAM(s) Oral every 6 hours PRN Temp greater or equal to 38.5C (101.3F), Mild Pain (1 - 3)          T(C): 36.6 (09-25 @ 06:19), Max: 36.9 (09-24 @ 20:02)   HR: 100   BP: 147/96   RR: 19   SpO2: 97%    PHYSICAL EXAM:    CONSTITUTIONAL: NAD, well-developed, well-groomed  EYES: PERRLA; conjunctiva and sclera clear  ENMT: Moist oral mucosa, no pharyngeal injection or exudates; normal dentition  NECK: Supple, no palpable masses; no thyromegaly  RESPIRATORY: Normal respiratory effort; lungs are clear to auscultation bilaterally  CARDIOVASCULAR: Regular rate and rhythm, normal S1 and S2, no murmur/rub/gallop; No lower extremity edema; Peripheral pulses are 2+ bilaterally  ABDOMEN: Nontender to palpation, normoactive bowel sounds, no rebound/guarding; No hepatosplenomegaly  MUSCULOSKELETAL:  Normal gait; no clubbing or cyanosis of digits; no joint swelling or tenderness to palpation  PSYCH: A+O to person, place, and time; affect appropriate  NEUROLOGY: CN 2-12 are intact and symmetric; no gross sensory deficits   SKIN: No rashes; no palpable lesions      LABS:                        13.5   5.51  )-----------( 231      ( 24 Sep 2021 07:00 )             41.5      09-25    139  |  102  |  16  ----------------------------<  96  3.6   |  25  |  0.84    Ca    8.4      25 Sep 2021 05:42  Mg     2.0     09-25         CAPILLARY BLOOD GLUCOSE          RADIOLOGY & ADDITIONAL TESTS:    Imaging Personally Reviewed:  Consultant(s) Notes Reviewed:    Care Discussed with Consultants/Other Providers:   Andre Reyes, M.D.  Pager: [phone]  Office: [phone]    Patient is a 55y old  Male who presents with a chief complaint of worsening dyspnea. LE swelling (24 Sep 2021 10:58)          SUBJECTIVE / OVERNIGHT EVENTS:    No acute overnight events.  feels well  no chest pain or palpitation     ROS: ( - ) Fever, ( - )Chills,  ( - )Nausea/Vomiting, ( - ) Cough, ( - )Shortness of breath, ( - )Chest Pain    MEDICATIONS  (STANDING):  aspirin 325 milliGRAM(s) Oral daily  enoxaparin Injectable 40 milliGRAM(s) SubCutaneous daily  furosemide    Tablet 40 milliGRAM(s) Oral daily  influenza   Vaccine 0.5 milliLiter(s) IntraMuscular once  lisinopril 5 milliGRAM(s) Oral daily  metoprolol tartrate 25 milliGRAM(s) Oral two times a day    MEDICATIONS  (PRN):  acetaminophen   Tablet .. 650 milliGRAM(s) Oral every 6 hours PRN Temp greater or equal to 38.5C (101.3F), Mild Pain (1 - 3)          T(C): 36.6 (09-25 @ 06:19), Max: 36.9 (09-24 @ 20:02)   HR: 100   BP: 147/96   RR: 19   SpO2: 97%    PHYSICAL EXAM:    CONSTITUTIONAL: NAD, well-developed, well-groomed  EYES: PERRLA; conjunctiva and sclera clear  ENMT: Moist oral mucosa, no pharyngeal injection or exudates; normal dentition  NECK: Supple, no palpable masses; no thyromegaly  RESPIRATORY: Normal respiratory effort; lungs are clear to auscultation bilaterally  CARDIOVASCULAR: Irregularly Irregular rate and rhythm, normal S1 and S2, no murmur/rub/gallop; 1+ lower extremity edema; Peripheral pulses are 2+ bilaterally  ABDOMEN: Nontender to palpation, normoactive bowel sounds, no rebound/guarding; No hepatosplenomegaly  MUSCULOSKELETAL:  Normal gait; no clubbing or cyanosis of digits; no joint swelling or tenderness to palpation  PSYCH: A+O to person, place, and time; affect appropriate  NEUROLOGY: CN 2-12 are intact and symmetric; no gross sensory deficits   SKIN: No rashes; no palpable lesions      LABS:                        13.5   5.51  )-----------( 231      ( 24 Sep 2021 07:00 )             41.5      09-25    139  |  102  |  16  ----------------------------<  96  3.6   |  25  |  0.84    Ca    8.4      25 Sep 2021 05:42  Mg     2.0     09-25         CAPILLARY BLOOD GLUCOSE          RADIOLOGY & ADDITIONAL TESTS:    Imaging Personally Reviewed:  Consultant(s) Notes Reviewed:    Care Discussed with Consultants/Other Providers:

## 2021-09-26 ENCOUNTER — TRANSCRIPTION ENCOUNTER (OUTPATIENT)
Age: 56
End: 2021-09-26

## 2021-09-26 LAB
ANION GAP SERPL CALC-SCNC: 12 MMOL/L — SIGNIFICANT CHANGE UP (ref 5–17)
BUN SERPL-MCNC: 18 MG/DL — SIGNIFICANT CHANGE UP (ref 7–23)
CALCIUM SERPL-MCNC: 8.2 MG/DL — LOW (ref 8.4–10.5)
CHLORIDE SERPL-SCNC: 104 MMOL/L — SIGNIFICANT CHANGE UP (ref 96–108)
CO2 SERPL-SCNC: 26 MMOL/L — SIGNIFICANT CHANGE UP (ref 22–31)
CREAT SERPL-MCNC: 0.84 MG/DL — SIGNIFICANT CHANGE UP (ref 0.5–1.3)
GLUCOSE SERPL-MCNC: 91 MG/DL — SIGNIFICANT CHANGE UP (ref 70–99)
MAGNESIUM SERPL-MCNC: 2 MG/DL — SIGNIFICANT CHANGE UP (ref 1.6–2.6)
POTASSIUM SERPL-MCNC: 3.9 MMOL/L — SIGNIFICANT CHANGE UP (ref 3.5–5.3)
POTASSIUM SERPL-SCNC: 3.9 MMOL/L — SIGNIFICANT CHANGE UP (ref 3.5–5.3)
SODIUM SERPL-SCNC: 142 MMOL/L — SIGNIFICANT CHANGE UP (ref 135–145)

## 2021-09-26 PROCEDURE — 99232 SBSQ HOSP IP/OBS MODERATE 35: CPT

## 2021-09-26 RX ADMIN — Medication 40 MILLIGRAM(S): at 05:24

## 2021-09-26 RX ADMIN — Medication 50 MILLIGRAM(S): at 05:24

## 2021-09-26 RX ADMIN — ENOXAPARIN SODIUM 40 MILLIGRAM(S): 100 INJECTION SUBCUTANEOUS at 11:27

## 2021-09-26 RX ADMIN — Medication 50 MILLIGRAM(S): at 17:26

## 2021-09-26 RX ADMIN — Medication 325 MILLIGRAM(S): at 11:27

## 2021-09-26 RX ADMIN — LISINOPRIL 10 MILLIGRAM(S): 2.5 TABLET ORAL at 05:24

## 2021-09-26 NOTE — DISCHARGE NOTE PROVIDER - HOSPITAL COURSE
55M with no PMH presenting with progressive dyspnea x 2-weeks with LE edema. On presentation found to have new-onset A.fib with HR-120s. Patient also found to have new onset HFrEF.  Rate now controlled on BB,  for now may need AC given CHADSVASC is 2,__________________.Post GENE/DCCV_______________________. New onset CHF likely  2/2 to atrial fibrillation, may require ischemic work up in the future. Diuresing well on PO Lasix, Lopressor and Lisinopril initiated and up-titrated for good BP control and rate control.        HPI:  55M no PMH presenting with progressive dyspnea x 2-weeks with LE edema. States he has also had significant decreased ET. He denies orthopnea, PND. He has not seen a doctor in years and called his fathers doctor who advised him to go to the ED once his symptoms got really bad. Denies lightheadedness, CP, abdominal pain, melena/hematochezia, recent travel or long-term hospitalization, h/o asthma/COPD.    ED course: HDS, HR:120s, EKG revealing A.fib, given diltiazem PO and IV. CTA neg for PE    Hospital Course:  # new onset Persistent atrial fibrillation.   - s/p GNEE/cardioversion yesterday now is sinus with pafib  -c/w eliquis 5mg bid   -c/w metoprolol 50mg bid  - per EP, rec amiodarone load  400mg PO x 1 and ischemic workup, can be discharged today if CT heart negative.  -  need for outpatient monitoring of PFT/TFT/LFT/opthalmology monitoring while on amiodarone.      # Acute systolic heart failure.   likely related to afib  induced cardiomyopathy vs ischemic cardiomyopathy,   -treat afib as above  -c/w Lisinopril 10 mg daily  -c/w lopressor  50 mg BID, change to toprol on discharge  - CT coronaries  - lasix 40 daily         HPI:  55M no PMH presenting with progressive dyspnea x 2-weeks with LE edema. States he has also had significant decreased ET. He denies orthopnea, PND. He has not seen a doctor in years and called his fathers doctor who advised him to go to the ED once his symptoms got really bad. Denies lightheadedness, CP, abdominal pain, melena/hematochezia, recent travel or long-term hospitalization, h/o asthma/COPD.    ED course: HDS, HR:120s, EKG revealing A.fib, given diltiazem PO and IV. CTA neg for PE    Hospital Course:  # new onset Persistent atrial fibrillation.   - s/p GENE/cardioversion yesterday now is sinus with pafib  -c/w eliquis 5mg bid   -c/w metoprolol 50mg bid  - per EP, rec amiodarone load  400mg PO x 1 and ischemic workup,   - CTA coronaries  1.  Normal CTA of the coronary arteries.  2.  Bilateral pleural effusions and lower lobe passive atelectasis.  3.  The right ventricle may be enlarged.    -  need for outpatient monitoring of PFT/TFT/LFT/opthalmology monitoring while on amiodarone.  - per EP , patient will be on 200 mg TID x 1 week, then 200 BID until EP follow up in 1 month    # Acute systolic heart failure.   likely related to afib  induced cardiomyopathy vs ischemic cardiomyopathy,   -treat afib as above  -c/w Lisinopril 10 mg daily  -c/w lopressor  50 mg BID, change to toprol on discharge  - CT coronaries: normal , b/l pleural effusions  - lasix 40 daily         HPI:  55M no PMH presenting with progressive dyspnea x 2-weeks with LE edema. States he has also had significant decreased ET. He denies orthopnea, PND. He has not seen a doctor in years and called his fathers doctor who advised him to go to the ED once his symptoms got really bad. Denies lightheadedness, CP, abdominal pain, melena/hematochezia, recent travel or long-term hospitalization, h/o asthma/COPD.    ED course: HDS, HR:120s, EKG revealing A.fib, given diltiazem PO and IV. CTA neg for PE    Hospital Course:  # new onset Persistent atrial fibrillation.   - s/p GENE/cardioversion yesterday now is sinus with pafib  -c/w eliquis 5mg bid   -c/w metoprolol 50mg bid- switch to toprol 50 daily - discussed with EP  - per EP, rec amiodarone load  400mg PO x 1 and ischemic workup,   - CTA coronaries  1.  Normal CTA of the coronary arteries.  2.  Bilateral pleural effusions and lower lobe passive atelectasis.  3.  The right ventricle may be enlarged.    -  need for outpatient monitoring of PFT/TFT/LFT/opthalmology monitoring while on amiodarone.  - per EP , patient will be on 200 mg TID x 1 week, then 200 BID until EP follow up in 1 month  - discussed CTA with cards- cleared for discharge    # Acute systolic heart failure.   likely related to afib  induced cardiomyopathy vs ischemic cardiomyopathy,   -treat afib as above  -c/w Lisinopril 10 mg daily  -c/w lopressor  50 mg BID, change to toprol on discharge  - CT coronaries: normal , b/l pleural effusions  - lasix 40 daily  - cards clearing patient for discharge    Pt is HDS and safe for discharge. Discussed with medicine attending, cardiology, EP. Med rec to be reviewed priro to discharge.  Pt has no needs.         HPI:  55M no PMH presenting with progressive dyspnea x 2-weeks with LE edema. States he has also had significant decreased ET. He denies orthopnea, PND. He has not seen a doctor in years and called his fathers doctor who advised him to go to the ED once his symptoms got really bad. Denies lightheadedness, CP, abdominal pain, melena/hematochezia, recent travel or long-term hospitalization, h/o asthma/COPD.    ED course: HDS, HR:120s, EKG revealing A.fib, given diltiazem PO and IV. CTA neg for PE    Hospital Course:  # new onset Persistent atrial fibrillation.   - s/p GENE/cardioversion yesterday now is sinus with pafib  -c/w eliquis 5mg bid   -c/w metoprolol 50mg bid- switch to toprol 50 daily - discussed with EP  - per EP, rec amiodarone load  400mg PO x 1 and ischemic workup,   - CTA coronaries  1.  Normal CTA of the coronary arteries.  2.  Bilateral pleural effusions and lower lobe passive atelectasis.  3.  The right ventricle may be enlarged.    -  need for outpatient monitoring of PFT/TFT/LFT/opthalmology monitoring while on amiodarone.  - per EP , patient will be on 200 mg TID x 1 week, then 200 BID until EP follow up in 1 month  - discussed CTA with cards- cleared for discharge    # Acute systolic heart failure.   likely related to afib  induced cardiomyopathy, ischemic cardiomyopathy ruled out with negative CTA    -treat afib as above  -c/w Lisinopril 10 mg daily  -c/w lopressor  50 mg BID, change to toprol on discharge per EP  - CT coronaries: normal , b/l pleural effusions  - lasix 40mg daily  - cards clearing patient for discharge    Pt is HDS and safe for discharge. Discussed with medicine attending, cardiology, EP with outpatient f/u  Pt has no needs.

## 2021-09-26 NOTE — PROGRESS NOTE ADULT - SUBJECTIVE AND OBJECTIVE BOX
Andre Reyes, M.D.  Pager: [phone]  Office: [phone]    Patient is a 55y old  Male who presents with a chief complaint of worsening dyspnea. LE swelling (25 Sep 2021 10:16)          SUBJECTIVE / OVERNIGHT EVENTS:    Afib on tele 70-110bpm    ROS: (  ) Fever, (  )Chills,  (  )Nausea/Vomiting, (  ) Cough, (  )Shortness of breath, (  )Chest Pain    MEDICATIONS  (STANDING):  aspirin 325 milliGRAM(s) Oral daily  enoxaparin Injectable 40 milliGRAM(s) SubCutaneous daily  furosemide    Tablet 40 milliGRAM(s) Oral daily  influenza   Vaccine 0.5 milliLiter(s) IntraMuscular once  lisinopril 10 milliGRAM(s) Oral daily  metoprolol tartrate 50 milliGRAM(s) Oral two times a day    MEDICATIONS  (PRN):  acetaminophen   Tablet .. 650 milliGRAM(s) Oral every 6 hours PRN Temp greater or equal to 38.5C (101.3F), Mild Pain (1 - 3)          T(C): 36.7 (09-26 @ 04:35), Max: 37 (09-25 @ 20:25)   HR: 64   BP: 136/87   RR: 17   SpO2: 95%    PHYSICAL EXAM:    CONSTITUTIONAL: NAD, well-developed, well-groomed  EYES: PERRLA; conjunctiva and sclera clear  ENMT: Moist oral mucosa, no pharyngeal injection or exudates; normal dentition  NECK: Supple, no palpable masses; no thyromegaly  RESPIRATORY: Normal respiratory effort; lungs are clear to auscultation bilaterally  CARDIOVASCULAR: Regular rate and rhythm, normal S1 and S2, no murmur/rub/gallop; No lower extremity edema; Peripheral pulses are 2+ bilaterally  ABDOMEN: Nontender to palpation, normoactive bowel sounds, no rebound/guarding; No hepatosplenomegaly  MUSCULOSKELETAL:  Normal gait; no clubbing or cyanosis of digits; no joint swelling or tenderness to palpation  PSYCH: A+O to person, place, and time; affect appropriate  NEUROLOGY: CN 2-12 are intact and symmetric; no gross sensory deficits   SKIN: No rashes; no palpable lesions      LABS:     09-26    142  |  104  |  18  ----------------------------<  91  3.9   |  26  |  0.84    Ca    8.2<L>      26 Sep 2021 07:15  Mg     2.0     09-26         CAPILLARY BLOOD GLUCOSE          RADIOLOGY & ADDITIONAL TESTS:    Imaging Personally Reviewed:  Consultant(s) Notes Reviewed:    Care Discussed with Consultants/Other Providers:   Andre Reyes, M.D.  Pager: [phone]  Office: [phone]    Patient is a 55y old  Male who presents with a chief complaint of worsening dyspnea. LE swelling (25 Sep 2021 10:16)          SUBJECTIVE / OVERNIGHT EVENTS:    Afib on tele 70-110bpm  no new complaints  ROS: ( - ) Fever, ( - )Chills,  ( - )Nausea/Vomiting, ( - ) Cough, ( - )Shortness of breath, (-  )Chest Pain    MEDICATIONS  (STANDING):  aspirin 325 milliGRAM(s) Oral daily  enoxaparin Injectable 40 milliGRAM(s) SubCutaneous daily  furosemide    Tablet 40 milliGRAM(s) Oral daily  influenza   Vaccine 0.5 milliLiter(s) IntraMuscular once  lisinopril 10 milliGRAM(s) Oral daily  metoprolol tartrate 50 milliGRAM(s) Oral two times a day    MEDICATIONS  (PRN):  acetaminophen   Tablet .. 650 milliGRAM(s) Oral every 6 hours PRN Temp greater or equal to 38.5C (101.3F), Mild Pain (1 - 3)          T(C): 36.7 (09-26 @ 04:35), Max: 37 (09-25 @ 20:25)   HR: 64   BP: 136/87   RR: 17   SpO2: 95%    PHYSICAL EXAM:    CONSTITUTIONAL: NAD, well-developed, well-groomed  EYES: PERRLA; conjunctiva and sclera clear  ENMT: Moist oral mucosa, no pharyngeal injection or exudates; normal dentition  NECK: Supple, no palpable masses; no thyromegaly  RESPIRATORY: Normal respiratory effort; lungs are clear to auscultation bilaterally  CARDIOVASCULAR: Irregularly irregular rate and rhythm, normal S1 and S2, no murmur/rub/gallop; No lower extremity edema; Peripheral pulses are 2+ bilaterally  ABDOMEN: Nontender to palpation, normoactive bowel sounds, no rebound/guarding; No hepatosplenomegaly  MUSCULOSKELETAL:  Normal gait; no clubbing or cyanosis of digits; no joint swelling or tenderness to palpation  PSYCH: A+O to person, place, and time; affect appropriate  NEUROLOGY: CN 2-12 are intact and symmetric; no gross sensory deficits   SKIN: No rashes; no palpable lesions      LABS:     09-26    142  |  104  |  18  ----------------------------<  91  3.9   |  26  |  0.84    Ca    8.2<L>      26 Sep 2021 07:15  Mg     2.0     09-26         CAPILLARY BLOOD GLUCOSE          RADIOLOGY & ADDITIONAL TESTS:    Imaging Personally Reviewed:  Consultant(s) Notes Reviewed:    Care Discussed with Consultants/Other Providers:

## 2021-09-26 NOTE — PROGRESS NOTE ADULT - PROBLEM SELECTOR PLAN 1
-Rate control appears adequate on metoprolol  -CHADSVASC is 2, will likely benefit anticoagulation. I d/w him about the need for anticoagulation, he is accepting of it.  -Tele monitoring  - many need cardioversion

## 2021-09-26 NOTE — DISCHARGE NOTE PROVIDER - NSDCMRMEDTOKEN_GEN_ALL_CORE_FT
amiodarone 200 mg oral tablet: 1 tab(s) orally 3 times a day for 1 week (9/28- 10/4)  than take 1 tab 2 times a day until 1 month EP f/u starting 10/5/21  apixaban 5 mg oral tablet: 1 tab(s) orally every 12 hours  furosemide 40 mg oral tablet: 1 tab(s) orally once a day  lisinopril 10 mg oral tablet: 1 tab(s) orally once a day  Toprol-XL 50 mg oral tablet, extended release: 1 tab(s) orally once a day

## 2021-09-26 NOTE — DISCHARGE NOTE PROVIDER - NSDCCPCAREPLAN_GEN_ALL_CORE_FT
PRINCIPAL DISCHARGE DIAGNOSIS  Diagnosis: New onset atrial fibrillation  Assessment and Plan of Treatment: Continue Aspirin as prescribed  Follow up with cardiologist  Atrial fibrillation is the most common heart rhythm problem.  The condition puts you at risk for has stroke and heart attack  It helps if you control your blood pressure, not drink more than 1-2 alcohol drinks per day, cut down on caffeine, getting treatment for over active thyroid gland, and get regular exercise  Call your doctor if you feel your heart racing or beating unusually, chest tightness or pain, lightheaded, faint, shortness of breath especially with exercise  It is important to take your heart medication as prescribed  You may be on anticoagulation which is very important to take as directed - you may need blood work to monitor drug levels        SECONDARY DISCHARGE DIAGNOSES  Diagnosis: Acute systolic heart failure  Assessment and Plan of Treatment: Follow up with cardiologist  Weigh yourself daily.  If you gain 3lbs in 3 days, or 5lbs in a week call your Health Care Provider.  Do not eat or drink foods containing more than 2000mg of salt (sodium) in your diet every day.  Call your Health Care Provider if you have any swelling or increased swelling in your feet, ankles, and/or stomach.  Take all of your medication as directed.  If you become dizzy call your Health Care Provider.       PRINCIPAL DISCHARGE DIAGNOSIS  Diagnosis: New onset atrial fibrillation  Assessment and Plan of Treatment:   Follow up with cardiologist  Atrial fibrillation is the most common heart rhythm problem.  The condition puts you at risk for has stroke and heart attack  It helps if you control your blood pressure, not drink more than 1-2 alcohol drinks per day, cut down on caffeine, getting treatment for over active thyroid gland, and get regular exercise  Call your doctor if you feel your heart racing or beating unusually, chest tightness or pain, lightheaded, faint, shortness of breath especially with exercise  It is important to take your heart medication as prescribed  You may be on anticoagulation which is very important to take as directed - you may need blood work to monitor drug levels  you had 9/27 cardioversion now is sinus with pafib  - Ct coronariea  -c/w eliquis 5mg bid   -c/w metoprolol 50mg bid  - will need to continue amiodarone as ordered  -  need for outpatient monitoring of Pulm function testing/Thyroid function testingLiver function testing/opthalmology monitoring while on amiodarone.  - follow up with cardiology and EP in 1 week      SECONDARY DISCHARGE DIAGNOSES  Diagnosis: Acute systolic heart failure  Assessment and Plan of Treatment: Follow up with cardiologist  Weigh yourself daily.  If you gain 3lbs in 3 days, or 5lbs in a week call your Health Care Provider.  Do not eat or drink foods containing more than 2000mg of salt (sodium) in your diet every day.  Call your Health Care Provider if you have any swelling or increased swelling in your feet, ankles, and/or stomach.  Take all of your medication as directed.  If you become dizzy call your Health Care Provider.  CT coronaries  Continue lasix as ordered  continue lisinopril       PRINCIPAL DISCHARGE DIAGNOSIS  Diagnosis: New onset atrial fibrillation  Assessment and Plan of Treatment: Follow up with cardiologist  Atrial fibrillation is the most common heart rhythm problem.  The condition puts you at risk for has stroke and heart attack  It helps if you control your blood pressure, not drink more than 1-2 alcohol drinks per day, cut down on caffeine, getting treatment for over active thyroid gland, and get regular exercise  Call your doctor if you feel your heart racing or beating unusually, chest tightness or pain, lightheaded, faint, shortness of breath especially with exercise  It is important to take your heart medication as prescribed  You may be on anticoagulation which is very important to take as directed - you may need blood work to monitor drug levels  you had 9/27 cardioversion now is sinus with pafib  - Ct coronaries normal, + bilateral pleural effusions  -c/w eliquis 5mg bid   -c/w metoprolol 50mg bid  - will need to continue amiodarone as ordered- 200 three times/day for 1 week, then 200 twice a day until 1 month Ep follow up  -  need for outpatient monitoring of Pulm function testing/Thyroid function testingLiver function testing/opthalmology monitoring while on amiodarone.  - follow up with cardiology in 1 week  - EP to call you for 1 month follow up      SECONDARY DISCHARGE DIAGNOSES  Diagnosis: Acute systolic heart failure  Assessment and Plan of Treatment: Follow up with cardiologist  Weigh yourself daily.  If you gain 3lbs in 3 days, or 5lbs in a week call your Health Care Provider.  Do not eat or drink foods containing more than 2000mg of salt (sodium) in your diet every day.  Call your Health Care Provider if you have any swelling or increased swelling in your feet, ankles, and/or stomach.  Take all of your medication as directed.  If you become dizzy call your Health Care Provider.  CT coronaries  Continue lasix as ordered  continue lisinopril       PRINCIPAL DISCHARGE DIAGNOSIS  Diagnosis: New onset atrial fibrillation  Assessment and Plan of Treatment: Follow up with cardiologist  Atrial fibrillation is the most common heart rhythm problem.  The condition puts you at risk for has stroke and heart attack  It helps if you control your blood pressure, not drink more than 1-2 alcohol drinks per day, cut down on caffeine, getting treatment for over active thyroid gland, and get regular exercise  Call your doctor if you feel your heart racing or beating unusually, chest tightness or pain, lightheaded, faint, shortness of breath especially with exercise  It is important to take your heart medication as prescribed  You may be on anticoagulation which is very important to take as directed - you may need blood work to monitor drug levels  you had 9/27 cardioversion now is sinus with pafib  - Ct coronaries normal, + bilateral pleural effusions  -c/w eliquis 5mg bid   -c/w Toprol 50 daily  - will need to continue amiodarone as ordered- 200 three times/day for 1 week, then 200 twice a day until 1 month Ep follow up  -  need for outpatient monitoring of Pulm function testing/Thyroid function testingLiver function testing/opthalmology monitoring while on amiodarone.  - follow up with cardiology in 1 week  - EP to call you for 1 month follow up      SECONDARY DISCHARGE DIAGNOSES  Diagnosis: Acute systolic heart failure  Assessment and Plan of Treatment: Follow up with cardiologist  Weigh yourself daily.  If you gain 3lbs in 3 days, or 5lbs in a week call your Health Care Provider.  Do not eat or drink foods containing more than 2000mg of salt (sodium) in your diet every day.  Call your Health Care Provider if you have any swelling or increased swelling in your feet, ankles, and/or stomach.  Take all of your medication as directed.  If you become dizzy call your Health Care Provider.  CT coronaries negative, + bilateral pleural effusions  Continue lasix as ordered  continue lisinopril

## 2021-09-26 NOTE — DISCHARGE NOTE PROVIDER - NSDCFUADDAPPT_GEN_ALL_CORE_FT
Schedule follow up appointment with cardiology in 1 week from discharge    APPTS ARE READY TO BE MADE: [ ] YES    Best Family or Patient Contact (if needed):    Additional Information about above appointments (if needed):    1:   2:   3:     Other comments or requests:    Schedule follow up appointment with cardiology in 1 week from discharge and electrophysiology    APPTS ARE READY TO BE MADE: [ ] YES    Best Family or Patient Contact (if needed):    Additional Information about above appointments (if needed):    1: outpatient monitoring of Pulm function test/Thyroid Function Test/Liver function Test/opthalmology  2:   3:     Other comments or requests:    Schedule follow up appointment with cardiology in 1 week from discharge and electrophysiology    APPTS ARE READY TO BE MADE: [x] YES    Best Family or Patient Contact (if needed):    Additional Information about above appointments (if needed):    1: outpatient monitoring of Pulm function test/Thyroid Function Test/Liver function Test/opthalmology  2:   3:     Other comments or requests:    Schedule follow up appointment with cardiology in 1 week from discharge and electrophysiology    APPTS ARE READY TO BE MADE: [x] YES    Best Family or Patient Contact (if needed):    Additional Information about above appointments (if needed):    1: outpatient monitoring of Pulm function test/Thyroid Function Test/Liver function Test/opthalmology  2:   3:     Other comments or requests:     Patient Declined Services  Patient made aware of follow up request. Three consecutive messages were left of patients' voicemail. At this time patient declined scheduling assistance.

## 2021-09-26 NOTE — PROGRESS NOTE ADULT - PROBLEM SELECTOR PLAN 2
-Likely new onset 2/2 to atrial fibrillation, may require ischemic work up in the future  -Lasix 40 mg PO (seems to be responding well to it)  -increase Lisinopril 10 mg daily  -increase lopressor to 50 mg BID  - I d/w Dr. Hummel --> pt will need a GENE with cardioversion -Likely new onset 2/2 to atrial fibrillation, may require ischemic work up in the future  -Lasix 40 mg PO (seems to be responding well to it)  -c/w Lisinopril 10 mg daily  -c/w lopressor to 50 mg BID  - I d/w Dr. Hummel --> pt will need a GENE with cardioversion

## 2021-09-26 NOTE — DISCHARGE NOTE PROVIDER - NSDCFUSCHEDAPPT_GEN_ALL_CORE_FT
MARGARITA WINCHESTER ; 10/11/2021 ; [facility] Cardio [address]  MARGARITA WINCHESTER ; 10/11/2021 ; [facility] Cardio [address] MARGARITA Morocho ; 10/11/2021 ; [facility] Cardio Electro [address] MARGARITA Morocho ; 10/18/2021 ; NPP Med GenInt [address] [facility]

## 2021-09-26 NOTE — PROGRESS NOTE ADULT - ASSESSMENT
55M no PMH presenting with progressive dyspnea x 2-weeks with LE edema. On presentation found to have new-onset A.fib with HR-120s. Patient also found to have new onset HFrEF. independent

## 2021-09-26 NOTE — DISCHARGE NOTE PROVIDER - CARE PROVIDER_API CALL
Jaciel Harrison (MD)  Cardiac Electrophysiology; Cardiovascular Disease; Internal Medicine  [address] [place] [address]  Phone: [phone]  Fax: [phone]  Follow Up Time: 1 month

## 2021-09-26 NOTE — DISCHARGE NOTE PROVIDER - NSFOLLOWUPCLINICS_GEN_ALL_ED_FT
[facility]  Cardiology  [address] [place] [address]  Phone: [phone]  Fax:   Follow Up Time: 1 week    [facility] General Internal Medicine  General Internal Medicine  [address] [place] [id number]  Phone: [phone]  Fax:   Follow Up Time: 1 week    [facility]  Ophthalmology  [address] [place] [address] [address]  [place] [address]  Phone: [phone]  Fax:   Follow Up Time: 1 week

## 2021-09-27 LAB
ANION GAP SERPL CALC-SCNC: 13 MMOL/L — SIGNIFICANT CHANGE UP (ref 5–17)
ANION GAP SERPL CALC-SCNC: 16 MMOL/L — SIGNIFICANT CHANGE UP (ref 5–17)
BUN SERPL-MCNC: 17 MG/DL — SIGNIFICANT CHANGE UP (ref 7–23)
BUN SERPL-MCNC: 20 MG/DL — SIGNIFICANT CHANGE UP (ref 7–23)
CALCIUM SERPL-MCNC: 8.4 MG/DL — SIGNIFICANT CHANGE UP (ref 8.4–10.5)
CALCIUM SERPL-MCNC: 8.6 MG/DL — SIGNIFICANT CHANGE UP (ref 8.4–10.5)
CHLORIDE SERPL-SCNC: 101 MMOL/L — SIGNIFICANT CHANGE UP (ref 96–108)
CHLORIDE SERPL-SCNC: 101 MMOL/L — SIGNIFICANT CHANGE UP (ref 96–108)
CO2 SERPL-SCNC: 22 MMOL/L — SIGNIFICANT CHANGE UP (ref 22–31)
CO2 SERPL-SCNC: 25 MMOL/L — SIGNIFICANT CHANGE UP (ref 22–31)
CREAT SERPL-MCNC: 0.81 MG/DL — SIGNIFICANT CHANGE UP (ref 0.5–1.3)
CREAT SERPL-MCNC: 0.85 MG/DL — SIGNIFICANT CHANGE UP (ref 0.5–1.3)
GLUCOSE SERPL-MCNC: 94 MG/DL — SIGNIFICANT CHANGE UP (ref 70–99)
GLUCOSE SERPL-MCNC: 97 MG/DL — SIGNIFICANT CHANGE UP (ref 70–99)
HCT VFR BLD CALC: 43.2 % — SIGNIFICANT CHANGE UP (ref 39–50)
HGB BLD-MCNC: 13.5 G/DL — SIGNIFICANT CHANGE UP (ref 13–17)
MCHC RBC-ENTMCNC: 29.4 PG — SIGNIFICANT CHANGE UP (ref 27–34)
MCHC RBC-ENTMCNC: 31.3 GM/DL — LOW (ref 32–36)
MCV RBC AUTO: 94.1 FL — SIGNIFICANT CHANGE UP (ref 80–100)
NRBC # BLD: 0 /100 WBCS — SIGNIFICANT CHANGE UP (ref 0–0)
PLATELET # BLD AUTO: 229 K/UL — SIGNIFICANT CHANGE UP (ref 150–400)
POTASSIUM SERPL-MCNC: 4.2 MMOL/L — SIGNIFICANT CHANGE UP (ref 3.5–5.3)
POTASSIUM SERPL-MCNC: 5.6 MMOL/L — HIGH (ref 3.5–5.3)
POTASSIUM SERPL-SCNC: 4.2 MMOL/L — SIGNIFICANT CHANGE UP (ref 3.5–5.3)
POTASSIUM SERPL-SCNC: 5.6 MMOL/L — HIGH (ref 3.5–5.3)
RBC # BLD: 4.59 M/UL — SIGNIFICANT CHANGE UP (ref 4.2–5.8)
RBC # FLD: 14.3 % — SIGNIFICANT CHANGE UP (ref 10.3–14.5)
SODIUM SERPL-SCNC: 139 MMOL/L — SIGNIFICANT CHANGE UP (ref 135–145)
SODIUM SERPL-SCNC: 139 MMOL/L — SIGNIFICANT CHANGE UP (ref 135–145)
WBC # BLD: 5.17 K/UL — SIGNIFICANT CHANGE UP (ref 3.8–10.5)
WBC # FLD AUTO: 5.17 K/UL — SIGNIFICANT CHANGE UP (ref 3.8–10.5)

## 2021-09-27 PROCEDURE — 93010 ELECTROCARDIOGRAM REPORT: CPT

## 2021-09-27 PROCEDURE — 93306 TTE W/DOPPLER COMPLETE: CPT | Mod: 26

## 2021-09-27 PROCEDURE — 93312 ECHO TRANSESOPHAGEAL: CPT | Mod: 26

## 2021-09-27 PROCEDURE — 99233 SBSQ HOSP IP/OBS HIGH 50: CPT

## 2021-09-27 PROCEDURE — 92960 CARDIOVERSION ELECTRIC EXT: CPT

## 2021-09-27 RX ORDER — APIXABAN 2.5 MG/1
5 TABLET, FILM COATED ORAL EVERY 12 HOURS
Refills: 0 | Status: DISCONTINUED | OUTPATIENT
Start: 2021-09-27 | End: 2021-09-28

## 2021-09-27 RX ADMIN — ENOXAPARIN SODIUM 40 MILLIGRAM(S): 100 INJECTION SUBCUTANEOUS at 11:29

## 2021-09-27 RX ADMIN — Medication 50 MILLIGRAM(S): at 17:07

## 2021-09-27 RX ADMIN — Medication 325 MILLIGRAM(S): at 11:29

## 2021-09-27 RX ADMIN — Medication 40 MILLIGRAM(S): at 05:13

## 2021-09-27 RX ADMIN — LISINOPRIL 10 MILLIGRAM(S): 2.5 TABLET ORAL at 05:13

## 2021-09-27 RX ADMIN — APIXABAN 5 MILLIGRAM(S): 2.5 TABLET, FILM COATED ORAL at 13:14

## 2021-09-27 RX ADMIN — Medication 50 MILLIGRAM(S): at 05:14

## 2021-09-27 NOTE — PROGRESS NOTE ADULT - SUBJECTIVE AND OBJECTIVE BOX
SUBJ:    Home Medications:      MEDICATIONS  (STANDING):  apixaban 5 milliGRAM(s) Oral every 12 hours  furosemide    Tablet 40 milliGRAM(s) Oral daily  influenza   Vaccine 0.5 milliLiter(s) IntraMuscular once  lisinopril 10 milliGRAM(s) Oral daily  metoprolol tartrate 50 milliGRAM(s) Oral two times a day    MEDICATIONS  (PRN):  acetaminophen   Tablet .. 650 milliGRAM(s) Oral every 6 hours PRN Temp greater or equal to 38.5C (101.3F), Mild Pain (1 - 3)      Vital Signs Last 24 Hrs  T(C): 36.9 (27 Sep 2021 19:52), Max: 36.9 (27 Sep 2021 19:52)  T(F): 98.4 (27 Sep 2021 19:52), Max: 98.4 (27 Sep 2021 19:52)  HR: 75 (27 Sep 2021 19:52) (74 - 89)  BP: 116/82 (27 Sep 2021 19:52) (116/82 - 145/94)  BP(mean): --  RR: 18 (27 Sep 2021 19:52) (17 - 19)  SpO2: 96% (27 Sep 2021 19:52) (96% - 98%)    REVIEW OF SYSTEMS:  As per HPI, otherwise unremarkable.     PHYSICAL EXAM:  Constitutional/Appearance: Normal, Well-developed  HEENT:   Normal oral mucosa, no drainage or redness, supple neck  Lymphatic: No lymphadenopathy  Cardiovascular: Normal S1 S2, No edema, RRR  Respiratory: Lungs clear to auscultation, respirations non-labored  Psychiatry: A & O x 3, appropriate affect.   Gastrointestinal:  Soft, Non-tender, no distention  Skin: No rashes, No ecchymoses, No cyanosis	  Neurologic: Non-focal, Alert and oriented x 3  Extremities: Normal range of motion  Vascular: Peripheral pulses palpable 2+ bilaterally (radial)    LABS:  CBC Full  -  ( 27 Sep 2021 07:19 )  WBC Count : 5.17 K/uL  RBC Count : 4.59 M/uL  Hemoglobin : 13.5 g/dL  Hematocrit : 43.2 %  Platelet Count - Automated : 229 K/uL  Mean Cell Volume : 94.1 fl  Mean Cell Hemoglobin : 29.4 pg  Mean Cell Hemoglobin Concentration : 31.3 gm/dL  Auto Neutrophil # : x  Auto Lymphocyte # : x  Auto Monocyte # : x  Auto Eosinophil # : x  Auto Basophil # : x  Auto Neutrophil % : x  Auto Lymphocyte % : x  Auto Monocyte % : x  Auto Eosinophil % : x  Auto Basophil % : x      09-27    139  |  101  |  17  ----------------------------<  94  4.2   |  25  |  0.81    Ca    8.6      27 Sep 2021 13:01  Mg     2.0     09-26              RADIOLOGY & ADDITIONAL STUDIES:  TELEMETRY:  ECG:  TTE:    IMPRESSION AND PLAN:    ***    Estephanie Hummel MD, MPH, JOSIE, RPVI, [facility]  Inpatient Cardiovascular Specialist; Sarika Daniel [facility], [facility]  ; [employer] [facility]  message: [employer] [phone] or Microsoft Teams (text preferred and/or call)  email: [email or web address] and Cardiovascular Surgery on-service contact/call information, go to [email or web address] and use "[email or web address]" to login.  Outpatient Cardiology appointments, call  [phone] to arrange with a colleague; I do not have outpatient Cardiology clinic.

## 2021-09-27 NOTE — PRE-ANESTHESIA EVALUATION ADULT - NSANTHOSAYNRD_GEN_A_CORE
No. ALMA screening performed.  STOP BANG Legend: 0-2 = LOW Risk; 3-4 = INTERMEDIATE Risk; 5-8 = HIGH Risk

## 2021-09-27 NOTE — PROGRESS NOTE ADULT - SUBJECTIVE AND OBJECTIVE BOX
Patient is a 55y old  Male who presents with a chief complaint of worsening dyspnea. LE swelling (26 Sep 2021 11:58)      SUBJECTIVE / OVERNIGHT EVENTS: No ON events. Feels well. Denies cp, sob, donahue, orthopnea leg swelling.     Tele reviewed: afib , briefly to 170s      ADDITIONAL REVIEW OF SYSTEMS: Negative except for above    MEDICATIONS  (STANDING):  apixaban 5 milliGRAM(s) Oral every 12 hours  furosemide    Tablet 40 milliGRAM(s) Oral daily  influenza   Vaccine 0.5 milliLiter(s) IntraMuscular once  lisinopril 10 milliGRAM(s) Oral daily  metoprolol tartrate 50 milliGRAM(s) Oral two times a day    MEDICATIONS  (PRN):  acetaminophen   Tablet .. 650 milliGRAM(s) Oral every 6 hours PRN Temp greater or equal to 38.5C (101.3F), Mild Pain (1 - 3)      CAPILLARY BLOOD GLUCOSE        I&O's Summary    26 Sep 2021 07:01  -  27 Sep 2021 07:00  --------------------------------------------------------  IN: 1010 mL / OUT: 0 mL / NET: 1010 mL        PHYSICAL EXAM:  Vital Signs Last 24 Hrs  T(C): 36.8 (27 Sep 2021 11:44), Max: 36.8 (26 Sep 2021 20:28)  T(F): 98.2 (27 Sep 2021 11:44), Max: 98.3 (26 Sep 2021 20:28)  HR: 74 (27 Sep 2021 11:44) (74 - 116)  BP: 122/77 (27 Sep 2021 11:44) (122/77 - 148/97)  BP(mean): --  RR: 18 (27 Sep 2021 11:44) (18 - 19)  SpO2: 98% (27 Sep 2021 11:44) (96% - 98%)    PHYSICAL EXAM:  GENERAL: NAD, well-developed  EYES:  conjunctiva and sclera clear  NECK: Supple, No JVD  CHEST/LUNG: Clear to auscultation bilaterally; No wheeze  HEART: IRRegular  rhythm; +tachy  ABDOMEN: Soft, Nontender, Nondistended; Bowel sounds present  EXTREMITIES:  2+ Peripheral Pulses, No clubbing, cyanosis, or edema  PSYCH: AAOx3  NEUROLOGY: non-focal  SKIN: No rashes or lesions      LABS:                        13.5   5.17  )-----------( 229      ( 27 Sep 2021 07:19 )             43.2     09-27    139  |  101  |  20  ----------------------------<  97  5.6<H>   |  22  |  0.85    Ca    8.4      27 Sep 2021 07:26  Mg     2.0     09-26                  RADIOLOGY & ADDITIONAL TESTS:    Imaging Personally Reviewed:    Electrocardiogram Personally Reviewed:    COORDINATION OF CARE:  Care Discussed with Consultants/Other Providers [Y/N]:  Prior or Outpatient Records Reviewed [Y/N]:

## 2021-09-27 NOTE — PRE-ANESTHESIA EVALUATION ADULT - NSANTHPMHFT_GEN_ALL_CORE
55M PMH obesity p/w new onset a. fib, EF: 20%, on eliquis and metoprolol 55M PMH obesity p/w new onset a. fib, EF: 20%, on eliquis and metoprolol.  Sternotomy scar on chest.  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

## 2021-09-27 NOTE — PROVIDER CONTACT NOTE (OTHER) - BACKGROUND
Pt is 55yoM w/ no significant PMH who presents w/ SOB on exertion and intermittent palpitations found to be in new Afib w/ RVR. Dx: New onset AFib RVR, LE Edema.
Dx: New onset AFib RVR- on PO Lopressor, no AC  (CHADSVASC 1)        LE edema- duplex negative for DVT; on PO Lasix; TTE EF 20%
Patient admitted for atrial fibrillation  No past medical history
Dx: New onset AFib RVR- on PO Lopressor, no AC  (CHADSVASC 1)        LE edema- duplex negative for DVT; on PO Lasix; TTE EF 20%
Pt is 55yoM w/ no significant PMH who presents w/ SOB on exertion and intermittent palpitations found to be in new Afib w/ RVR. Dx: New onset AFib RVR, LE Edema.

## 2021-09-27 NOTE — PROGRESS NOTE ADULT - PROBLEM SELECTOR PLAN 2
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

## 2021-09-27 NOTE — PROVIDER CONTACT NOTE (OTHER) - ASSESSMENT
Patient alert and oriented x 4. Asymptomatic. Tachy during PM care provided by Spouse. No distress reported. Pending GENE with cardioversion in the morning
Patient is A&Ox4 on room air and /104 other VSS  No s/s of distress noted or verbalized
Patient alert and oriented x 4. S/P GENE w/cardioversion. Returned to unit in NSR 90's. BB continues.  Episode of PAF for 3 seconds.
RN was in room with patient at time of event, no s/s present. pt is A&Ox4, VSS. Pt asymptomatic. Pt denies CP, SOB, palpitations.
pt is A&Ox4, VSS. Pt sleeping at the time of event. Pt asymptomatic. Pt denies CP, SOB, palpitations.

## 2021-09-27 NOTE — PROVIDER CONTACT NOTE (OTHER) - SITUATION
Patient with episode of PAF up to 160's x 3 sec
Ectopy: 5 beats WCT
/104
Patient tachy with -180's for few minutes during pm care
Patient with episode of Afib RVR on tele HR up to 165.

## 2021-09-27 NOTE — CONSULT NOTE ADULT - ASSESSMENT
54 yo M w/ no significant PMH who presents w/ SOB on exertion and intermittent palpitations found to be in new Afib w/ RVR.     #Afib   Unclear what caused his Afib. CT PE negative. Now HR <110.   -Start metoprolol 12.5mg BID for HR <110   -CHADSVASC 0, no need for AC at this time   -Can consider DCCV for his Afib, make NPO after 12am   -Recommend TSH   -Recommend echo 
55 year old male with no PMHx presented to [facility] ED with complaints of  progressive dyspnea x 2 weeks with lower extremity edema x1 week. On presentation found to have new-onset AF with HR-120s. Patient also found to have new onset HFrEF with EF of 20%. Patient states he works as a  at [employer] and has noticed for the past 2-3 weeks he has been out of breath while fulfilling his work duties. Patient states he has not seen a doctor in a long time and he has never been told he has an arrhythmia or cardiac issues. Patient denies chest pain, palpitations, lightheadedness/dizziness and syncope. EP consulted for GENE/DCCV evaluation.     1. New onset AF with RVR    - Continue to monitor telemetry  - Keep K >4 and Mg >2. Repeat BMP STAT for K of 5.6  - Maintain NPO for GENE/DCCV today  - Start Eliquis 5mg BID, first dose STAT  - Continue Metoprolol 50mg BID for rate control    Pauline Narayan PA-C  #[phone]

## 2021-09-27 NOTE — PROGRESS NOTE ADULT - PROBLEM SELECTOR PLAN 1
-plan for GENE/cardioversion today per cards/ EP  -discussed with EP jocelynn Carpenter starting eliquis 5mg bid (pt will  need to remain on for at least 45 days), discussed r/b/a and pt agreeable  - c/w metoprolol 50mg bid  -tele

## 2021-09-27 NOTE — PROVIDER CONTACT NOTE (OTHER) - ACTION/TREATMENT ORDERED:
Provider made aware. Metoprolol dosage adjusted. Will continue to monitor.
ACP made aware  Administer PO metoprolol as ordered and continue to monitor
Continue current med management and cardiac monitoring.
NP aware. no new interventions at this time. Administer AM meds as ordered. Will continue to monitor.
continue cardiac monitoring and med management.  Plan for GENE with cardioversion in the morning. Safety maintained.

## 2021-09-27 NOTE — PROVIDER CONTACT NOTE (OTHER) - REASON
Patient with episode of PAF up to 160's x 3 sec
Ectopy: 5 beats WCT
EctopyT
/104
Patient tachy with -180's for few minutes during pm care

## 2021-09-27 NOTE — CONSULT NOTE ADULT - SUBJECTIVE AND OBJECTIVE BOX
CHIEF COMPLAINT: SOB x2 weeks    HISTORY OF PRESENT ILLNESS: 55 year old male with no PMHx presented to [facility] ED with complaints of  progressive dyspnea x 2 weeks with lower extremity edema x1 week. On presentation found to have new-onset AF with HR-120s. Patient also found to have new onset HFrEF with EF of 20%. Patient states he works as a  at [employer] and has noticed for the past 2-3 weeks he has been out of breath while fulfilling his work duties. Patient states he has not seen a doctor in a long time and he has never been told he has an arrhythmia or cardiac issues.  Patient denies chest pain, palpitations, lightheadedness/dizziness and syncope. EP consulted for GENE/DCCV evaluation.       Allergies    No Known Allergies    Intolerances    	    MEDICATIONS:  enoxaparin Injectable 40 milliGRAM(s) SubCutaneous daily  furosemide    Tablet 40 milliGRAM(s) Oral daily  lisinopril 10 milliGRAM(s) Oral daily  metoprolol tartrate 50 milliGRAM(s) Oral two times a day  acetaminophen   Tablet .. 650 milliGRAM(s) Oral every 6 hours PRN  aspirin 325 milliGRAM(s) Oral daily  influenza   Vaccine 0.5 milliLiter(s) IntraMuscular once      PAST MEDICAL & SURGICAL HISTORY:  No pertinent past medical history      REVIEW OF SYSTEMS:  See HPI. Otherwise, 10 point ROS done and otherwise negative.    PHYSICAL EXAM:  T(C): 36.8 (09-27-21 @ 11:44), Max: 36.8 (09-26-21 @ 20:28)  HR: 74 (09-27-21 @ 11:44) (74 - 116)  BP: 122/77 (09-27-21 @ 11:44) (122/77 - 148/97)  RR: 18 (09-27-21 @ 11:44) (18 - 19)  SpO2: 98% (09-27-21 @ 11:44) (96% - 98%)  Wt(kg): --  I&O's Summary    26 Sep 2021 07:01  -  27 Sep 2021 07:00  --------------------------------------------------------  IN: 1010 mL / OUT: 0 mL / NET: 1010 mL        Appearance: Alert. NAD	  Cardiovascular: Irregularly irregular  Respiratory: CTA B/L	  Extremities: No edema BLE  Vascular: Peripheral pulses palpable 2+ bilaterally      LABS:	 	    CBC Full  -  ( 27 Sep 2021 07:19 )  WBC Count : 5.17 K/uL  Hemoglobin : 13.5 g/dL  Hematocrit : 43.2 %  Platelet Count - Automated : 229 K/uL  Mean Cell Volume : 94.1 fl  Mean Cell Hemoglobin : 29.4 pg  Mean Cell Hemoglobin Concentration : 31.3 gm/dL  Auto Neutrophil # : x  Auto Lymphocyte # : x  Auto Monocyte # : x  Auto Eosinophil # : x  Auto Basophil # : x  Auto Neutrophil % : x  Auto Lymphocyte % : x  Auto Monocyte % : x  Auto Eosinophil % : x  Auto Basophil % : x    09-27    139  |  101  |  20  ----------------------------<  97  5.6<H>   |  22  |  0.85  09-26    142  |  104  |  18  ----------------------------<  91  3.9   |  26  |  0.84    Ca    8.4      27 Sep 2021 07:26  Ca    8.2<L>      26 Sep 2021 07:15  Mg     2.0     09-26        proBNP: 1636  HgA1c: 6.5  TSH: 2.09      CARDIAC MARKERS: 19 -> 18    TELEMETRY: AF 70-90, up to 130 BPM  ECG: AF @ 78 BPM      PREVIOUS DIAGNOSTIC TESTING:    Echocardiogram: < from: TTE with Doppler (w/Cont) (09.23.21 @ 08:08) >  Patient name: MARGARITA WINCHESTER  YOB: 1965   Age: 55 (M)   MR#: [id number]  Study Date: 9/23/2021  [facility]  Study quality: Technically difficult  Referring Physician: Joy Moctezuma MD  Blood Pressure: 147/99 mmHg  Height: 178 cm  Weight: 107 kg  BSA: 2.2 m2  ------------------------------------------------------------------------  PROCEDURE: Transthoracic echocardiogram with 2-D, M-Mode  and complete spectral and color flow Doppler. Verbal  consent was obtained for injection of  Ultrasonic Enhancing  Agent following a discussion of risks and benefits.  Following intravenous injection of Ultrasonic Enhancing  Agent , harmonic imaging was performed.  INDICATION: Dyspnea, unspecified (R06.00)  ------------------------------------------------------------------------  Dimensions:    Normal Values:  LA:     4.6    2.0 - 4.0 cm  Ao:     2.9    2.0 - 3.8 cm  SEPTUM: 1.2    0.6 - 1.2 cm  PWT:    1.0    0.6 - 1.1 cm  LVIDd:  5.5    3.0- 5.6 cm  LVIDs:  4.9    1.8 - 4.0 cm  Derived variables:  LVMI: 108 g/m2  RWT: 0.36  Fractional short: 11 %  EF (Visual Estimate): 20 %  Doppler Peak Velocity (m/sec): AoV=0.9  ------------------------------------------------------------------------  Observations:  Mitral Valve: Tethered mitral valve leaflets with normal  opening. Mild mitral regurgitation.  Aortic Valve/Aorta: Normal trileaflet aortic valve. Peak  transaortic valve gradient equals 3 mm Hg. Peak left  ventricular outflow tract gradient equals 1 mm Hg.  Aortic Root: 2.9 cm.  Left Atrium: Mildly dilated left atrium.  LA volume index =  39 cc/m2.  Left Ventricle: Endocardial visualization enhanced with  intravenous injection of Ultrasonic Enhancing Agent  (Definity).  Severe global left ventricular systolic  dysfunction. Septal motion consistent with right  ventricular overload. Normal left ventricular internal  dimensions and wall thicknesses. Unable to evaluate  diastology.  Right Heart: Normal right atrium. The right ventricle is  not well visualized. Normal tricuspid valve. Mild tricuspid  regurgitation. Normal pulmonic valve. Minimal pulmonic  regurgitation.  Pericardium/Pleura: Normal pericardium with no pericardial  effusion.  Hemodynamic: Estimated right atrial pressure is 8 mm Hg.  Estimated right ventricular systolic pressure equals 36 mm  Hg, assuming right atrial pressure equals 8 mm Hg,  consistent with borderline pulmonary hypertension.  ------------------------------------------------------------------------  Conclusions:  1. Endocardial visualization enhanced with intravenous  injection of Ultrasonic Enhancing Agent (Definity).  Severe  global left ventricular systolic dysfunction.  2. The right ventricle is not well visualized.  *** No previous Echo exam.  ------------------------------------------------------------------------  Confirmed on  9/23/2021 - 15:52:27 by AMANDA Gonzalez  ------------------------------------------------------------------------    < end of copied text >      Catheterization:

## 2021-09-28 ENCOUNTER — TRANSCRIPTION ENCOUNTER (OUTPATIENT)
Age: 56
End: 2021-09-28

## 2021-09-28 VITALS
DIASTOLIC BLOOD PRESSURE: 84 MMHG | RESPIRATION RATE: 18 BRPM | TEMPERATURE: 98 F | HEART RATE: 84 BPM | OXYGEN SATURATION: 98 % | SYSTOLIC BLOOD PRESSURE: 131 MMHG

## 2021-09-28 PROCEDURE — 92960 CARDIOVERSION ELECTRIC EXT: CPT

## 2021-09-28 PROCEDURE — 85025 COMPLETE CBC W/AUTO DIFF WBC: CPT

## 2021-09-28 PROCEDURE — 84484 ASSAY OF TROPONIN QUANT: CPT

## 2021-09-28 PROCEDURE — 83880 ASSAY OF NATRIURETIC PEPTIDE: CPT

## 2021-09-28 PROCEDURE — U0003: CPT

## 2021-09-28 PROCEDURE — 99239 HOSP IP/OBS DSCHRG MGMT >30: CPT

## 2021-09-28 PROCEDURE — 96374 THER/PROPH/DIAG INJ IV PUSH: CPT

## 2021-09-28 PROCEDURE — 76377 3D RENDER W/INTRP POSTPROCES: CPT

## 2021-09-28 PROCEDURE — 85027 COMPLETE CBC AUTOMATED: CPT

## 2021-09-28 PROCEDURE — 71275 CT ANGIOGRAPHY CHEST: CPT | Mod: MA

## 2021-09-28 PROCEDURE — 83735 ASSAY OF MAGNESIUM: CPT

## 2021-09-28 PROCEDURE — 93005 ELECTROCARDIOGRAM TRACING: CPT

## 2021-09-28 PROCEDURE — U0005: CPT

## 2021-09-28 PROCEDURE — 96375 TX/PRO/DX INJ NEW DRUG ADDON: CPT

## 2021-09-28 PROCEDURE — 84100 ASSAY OF PHOSPHORUS: CPT

## 2021-09-28 PROCEDURE — 85730 THROMBOPLASTIN TIME PARTIAL: CPT

## 2021-09-28 PROCEDURE — 93010 ELECTROCARDIOGRAM REPORT: CPT

## 2021-09-28 PROCEDURE — 71046 X-RAY EXAM CHEST 2 VIEWS: CPT

## 2021-09-28 PROCEDURE — 75574 CT ANGIO HRT W/3D IMAGE: CPT

## 2021-09-28 PROCEDURE — 36415 COLL VENOUS BLD VENIPUNCTURE: CPT

## 2021-09-28 PROCEDURE — 85610 PROTHROMBIN TIME: CPT

## 2021-09-28 PROCEDURE — 82962 GLUCOSE BLOOD TEST: CPT

## 2021-09-28 PROCEDURE — 80061 LIPID PANEL: CPT

## 2021-09-28 PROCEDURE — 83036 HEMOGLOBIN GLYCOSYLATED A1C: CPT

## 2021-09-28 PROCEDURE — 99285 EMERGENCY DEPT VISIT HI MDM: CPT

## 2021-09-28 PROCEDURE — 86769 SARS-COV-2 COVID-19 ANTIBODY: CPT

## 2021-09-28 PROCEDURE — 84443 ASSAY THYROID STIM HORMONE: CPT

## 2021-09-28 PROCEDURE — 83690 ASSAY OF LIPASE: CPT

## 2021-09-28 PROCEDURE — 80053 COMPREHEN METABOLIC PANEL: CPT

## 2021-09-28 PROCEDURE — C8925: CPT

## 2021-09-28 PROCEDURE — 85379 FIBRIN DEGRADATION QUANT: CPT

## 2021-09-28 PROCEDURE — 99232 SBSQ HOSP IP/OBS MODERATE 35: CPT

## 2021-09-28 PROCEDURE — C8929: CPT

## 2021-09-28 PROCEDURE — 80048 BASIC METABOLIC PNL TOTAL CA: CPT

## 2021-09-28 PROCEDURE — 93970 EXTREMITY STUDY: CPT

## 2021-09-28 PROCEDURE — 75574 CT ANGIO HRT W/3D IMAGE: CPT | Mod: 26

## 2021-09-28 RX ORDER — AMIODARONE HYDROCHLORIDE 400 MG/1
200 TABLET ORAL
Refills: 0 | Status: CANCELLED | OUTPATIENT
Start: 2021-10-05 | End: 2021-09-28

## 2021-09-28 RX ORDER — FUROSEMIDE 40 MG
1 TABLET ORAL
Qty: 30 | Refills: 0
Start: 2021-09-28 | End: 2021-10-27

## 2021-09-28 RX ORDER — AMIODARONE HYDROCHLORIDE 400 MG/1
1 TABLET ORAL
Qty: 71 | Refills: 0
Start: 2021-09-28 | End: 2021-11-03

## 2021-09-28 RX ORDER — AMIODARONE HYDROCHLORIDE 400 MG/1
400 TABLET ORAL ONCE
Refills: 0 | Status: COMPLETED | OUTPATIENT
Start: 2021-09-28 | End: 2021-09-28

## 2021-09-28 RX ORDER — METOPROLOL TARTRATE 50 MG
50 TABLET ORAL ONCE
Refills: 0 | Status: COMPLETED | OUTPATIENT
Start: 2021-09-28 | End: 2021-09-28

## 2021-09-28 RX ORDER — AMIODARONE HYDROCHLORIDE 400 MG/1
200 TABLET ORAL THREE TIMES A DAY
Refills: 0 | Status: DISCONTINUED | OUTPATIENT
Start: 2021-09-28 | End: 2021-09-28

## 2021-09-28 RX ADMIN — Medication 50 MILLIGRAM(S): at 17:30

## 2021-09-28 RX ADMIN — APIXABAN 5 MILLIGRAM(S): 2.5 TABLET, FILM COATED ORAL at 17:30

## 2021-09-28 RX ADMIN — AMIODARONE HYDROCHLORIDE 400 MILLIGRAM(S): 400 TABLET ORAL at 11:31

## 2021-09-28 RX ADMIN — LISINOPRIL 10 MILLIGRAM(S): 2.5 TABLET ORAL at 05:35

## 2021-09-28 RX ADMIN — Medication 50 MILLIGRAM(S): at 13:18

## 2021-09-28 RX ADMIN — APIXABAN 5 MILLIGRAM(S): 2.5 TABLET, FILM COATED ORAL at 05:35

## 2021-09-28 RX ADMIN — Medication 50 MILLIGRAM(S): at 05:35

## 2021-09-28 RX ADMIN — Medication 40 MILLIGRAM(S): at 05:35

## 2021-09-28 NOTE — PROGRESS NOTE ADULT - PROBLEM SELECTOR PLAN 3
-Diet: Regular  -DVT: eliquis    Dispo: discharge home today If cleared by EP and if CT Coronaries without urgent findings    discussed EP and NP Nikolas  spent 60 min on d/c time

## 2021-09-28 NOTE — PROGRESS NOTE ADULT - PROBLEM SELECTOR PLAN 1
s/p GENE/cardioversion yesterday now is sinus with pafib  -c/w eliquis 5mg bid   -c/w metoprolol 50mg bid  -tele  -discussed with EP, rec amiodarone load and ischemic workup, can be discharged today if CT heart negative

## 2021-09-28 NOTE — PROGRESS NOTE ADULT - ATTENDING COMMENTS
This patient has a high risk of AF recurrence and given the severe LV dysfunction, reasonable to use short term amiodarone to allow for regain of LV function in sinus rhythm. He will need to be followed in the clinic with TSH and LFTs and plan to discontinue amiodarone after 2-3 months. IF AF recurs, he would benefit from ablation for atrial fibrillation.

## 2021-09-28 NOTE — PROGRESS NOTE ADULT - REASON FOR ADMISSION
worsening dyspnea. LE swelling

## 2021-09-28 NOTE — PROGRESS NOTE ADULT - PROBLEM SELECTOR PLAN 2
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

## 2021-09-28 NOTE — PROGRESS NOTE ADULT - PROBLEM SELECTOR PROBLEM 3
HTN (hypertension)
Prophylactic measure

## 2021-09-28 NOTE — DISCHARGE NOTE NURSING/CASE MANAGEMENT/SOCIAL WORK - NSDCFUADDAPPT_GEN_ALL_CORE_FT
Schedule follow up appointment with cardiology in 1 week from discharge and electrophysiology    APPTS ARE READY TO BE MADE: [x] YES    Best Family or Patient Contact (if needed):    Additional Information about above appointments (if needed):    1: outpatient monitoring of Pulm function test/Thyroid Function Test/Liver function Test/opthalmology  2:   3:     Other comments or requests:

## 2021-09-28 NOTE — PROGRESS NOTE ADULT - PROVIDER SPECIALTY LIST ADULT
Cardiology
Electrophysiology
Internal Medicine
Internal Medicine
Hospitalist
Hospitalist
Internal Medicine
Hospitalist

## 2021-09-28 NOTE — PROGRESS NOTE ADULT - PROBLEM SELECTOR PROBLEM 2
Acute systolic heart failure
Swelling of lower extremity
Acute systolic heart failure

## 2021-09-28 NOTE — DISCHARGE NOTE NURSING/CASE MANAGEMENT/SOCIAL WORK - PATIENT PORTAL LINK FT
You can access the FollowMyHealth Patient Portal offered by [facility] by registering at the following website: [email or web address]. By joining [employer]’s FollowMyHealth portal, you will also be able to view your health information using other applications (apps) compatible with our system.

## 2021-09-28 NOTE — PROGRESS NOTE ADULT - SUBJECTIVE AND OBJECTIVE BOX
Patient is a 55y old  Male who presents with a chief complaint of worsening dyspnea. LE swelling (27 Sep 2021 22:50)      SUBJECTIVE / OVERNIGHT EVENTS: No On events. Feels well, denies chest pain, sob, palpitations, n/v, dizziness.    Tele reviewed: sinus , short pafib      ADDITIONAL REVIEW OF SYSTEMS: Negative except for above    MEDICATIONS  (STANDING):  apixaban 5 milliGRAM(s) Oral every 12 hours  furosemide    Tablet 40 milliGRAM(s) Oral daily  influenza   Vaccine 0.5 milliLiter(s) IntraMuscular once  lisinopril 10 milliGRAM(s) Oral daily  metoprolol tartrate 50 milliGRAM(s) Oral two times a day  metoprolol tartrate 50 milliGRAM(s) Oral once    MEDICATIONS  (PRN):  acetaminophen   Tablet .. 650 milliGRAM(s) Oral every 6 hours PRN Temp greater or equal to 38.5C (101.3F), Mild Pain (1 - 3)      CAPILLARY BLOOD GLUCOSE      POCT Blood Glucose.: 87 mg/dL (28 Sep 2021 07:46)    I&O's Summary      PHYSICAL EXAM:  Vital Signs Last 24 Hrs  T(C): 35.9 (28 Sep 2021 04:28), Max: 36.9 (27 Sep 2021 19:52)  T(F): 96.6 (28 Sep 2021 04:28), Max: 98.4 (27 Sep 2021 19:52)  HR: 72 (28 Sep 2021 04:28) (72 - 84)  BP: 122/79 (28 Sep 2021 04:28) (116/82 - 136/95)  BP(mean): --  RR: 18 (28 Sep 2021 04:28) (17 - 18)  SpO2: 95% (28 Sep 2021 04:28) (95% - 98%)    PHYSICAL EXAM:  GENERAL: NAD, well-developed  HEAD:  Atraumatic, Normocephalic  NECK: Supple, No JVD  CHEST/LUNG: Clear to auscultation bilaterally; No wheeze  HEART: Regular rate and rhythm;   ABDOMEN: Soft, Nontender, Nondistended; Bowel sounds present  EXTREMITIES:  2+ Peripheral Pulses, No clubbing, cyanosis, or edema  PSYCH: AAOx3  NEUROLOGY: non-focal        LABS:                        13.5   5.17  )-----------( 229      ( 27 Sep 2021 07:19 )             43.2     09-27    139  |  101  |  17  ----------------------------<  94  4.2   |  25  |  0.81    Ca    8.6      27 Sep 2021 13:01                  RADIOLOGY & ADDITIONAL TESTS:    Imaging Personally Reviewed:    Electrocardiogram Personally Reviewed:    COORDINATION OF CARE:  Care Discussed with Consultants/Other Providers [Y/N]:  Prior or Outpatient Records Reviewed [Y/N]:

## 2021-09-28 NOTE — PROGRESS NOTE ADULT - PROBLEM SELECTOR PROBLEM 1
Persistent atrial fibrillation
Atrial fibrillation

## 2021-09-28 NOTE — PROGRESS NOTE ADULT - SUBJECTIVE AND OBJECTIVE BOX
24H hour events: Pt without complaint, no acute events overnight, s/p GENE/DCCV yesterday. Tele: SR at , PAF up to 160's      MEDICATIONS:  apixaban 5 milliGRAM(s) Oral every 12 hours  furosemide    Tablet 40 milliGRAM(s) Oral daily  lisinopril 10 milliGRAM(s) Oral daily  metoprolol tartrate 50 milliGRAM(s) Oral two times a day  acetaminophen   Tablet .. 650 milliGRAM(s) Oral every 6 hours PRN  influenza   Vaccine 0.5 milliLiter(s) IntraMuscular once      REVIEW OF SYSTEMS:  Complete 10point ROS negative.    PHYSICAL EXAM:  T(C): 37.3 (09-28-21 @ 11:00), Max: 37.3 (09-28-21 @ 11:00)  HR: 82 (09-28-21 @ 11:00) (72 - 84)  BP: 132/93 (09-28-21 @ 11:00) (116/82 - 136/95)  RR: 18 (09-28-21 @ 11:00) (17 - 18)  SpO2: 97% (09-28-21 @ 11:00) (95% - 97%)  Wt(kg): --  I&O's Summary      Appearance: Normal	  HEENT: Neck supple   Cardiovascular: Normal S1 S2, No JVD, No murmurs  Respiratory: Lungs clear to auscultation	  Psychiatry: A & O x 3, Mood & affect appropriate  Gastrointestinal:  Soft, Non-tender, + BS	  Skin: No rashes  Extremities: No edema  Vascular: Peripheral pulses palpable 2+ bilaterally      LABS:	 	    CBC Full  -  ( 27 Sep 2021 07:19 )  WBC Count : 5.17 K/uL  Hemoglobin : 13.5 g/dL  Hematocrit : 43.2 %  Platelet Count - Automated : 229 K/uL  Mean Cell Volume : 94.1 fl  Mean Cell Hemoglobin : 29.4 pg  Mean Cell Hemoglobin Concentration : 31.3 gm/dL      09-27    139  |  101  |  17  ----------------------------<  94  4.2   |  25  |  0.81  09-27    139  |  101  |  20  ----------------------------<  97  5.6<H>   |  22  |  0.85    Ca    8.6      27 Sep 2021 13:01  Ca    8.4      27 Sep 2021 07:26    Thyroid Stimulating Hormone, Serum (09.23.21 @ 05:41)    Thyroid Stimulating Hormone, Serum: 2.09 uIU/mL      TELEMETRY: SR at , PAF up to 160's   	      < from: GENE w/TTE (w/Cont) (09.27.21 @ 11:40) >  Dimensions:    Normal Values:  LA:     3.8    2.0 - 4.0 cm  Ao:     2.7    2.0 - 3.8 cm  SEPTUM: 1.0    0.6 - 1.2 cm  PWT:    1.1    0.6 - 1.1 cm  LVIDd:  4.3    3.0 - 5.6 cm  LVIDs:  2.8    1.8 - 4.0 cm  EF (Visual Estimate): 25 %  ------------------------------------------------------------------------  Observations:  Mitral Valve: Tethered mitral valve leaflets with normal  opening. Mild mitral regurgitation.  Aortic Valve/Aorta: Normal trileaflet aortic valve. Minimal  aortic regurgitation.  Aortic Annulus: 2 cm.  Aortic Root: 2.7 cm.  Sinotubular Junction: 3 cm.  Left Atrium: Mildly dilated left atrium.  LA volume index =  35 cc/m2. No left atrial or left atrial appendage thrombus.  Spontaneous echo contrast seen.  Left atrial appendage  visualization enhanced with intravenous injection of  Ultrasonic Enhancing Agent (Definity).  Left Ventricle: Severe global left ventricular systolic  dysfunction. Endocardial visualization enhanced with  intravenous injection of Ultrasonic Enhancing Agent  (Definity). Normal left ventricular internal dimensions and  wall thicknesses.  Right Heart: right atrial enlargement. Right ventricular  enlargement with decreased right ventricular systolicfunction. Tethered tricuspid valve. Severe tricuspid  regurgitation. Pulmonic valve not well visualized.  Pericardium/Pleura: Normal pericardium with no pericardial  effusion.  Hemodynamic: Estimated right atrial pressure is 8 mm Hg.  Estimated right ventricular systolic pressure equals 28 mm  Hg,assuming right atrial pressure equals 8 mm Hg,  consistent with normal pulmonary pressures. Contrast  injection demonstrates no evidence of a patent foramen  ovale.  ------------------------------------------------------------------------  Conclusions:  1. Tethered mitral valve leaflets with normal opening.  2. Mildly dilated left atrium.  LA volume index = 35 cc/m2.  No left atrial or left atrial appendage thrombus.  Spontaneous echo contrast seen.  Left atrial appendage  visualization enhanced with intravenous injection of  Ultrasonic Enhancing Agent (Definity).  3. Severe global left ventricular systolic dysfunction.  Endocardial visualization enhanced with intravenous  injection of Ultrasonic Enhancing Agent (Definity).  4. Right ventricular enlargement with decreased right  ventricular systolic function.  5. Tethered tricuspid valve. Severe tricuspid  regurgitation.    < end of copied text >

## 2021-09-28 NOTE — PROGRESS NOTE ADULT - ASSESSMENT
55 year old male with no PMHx presented to [facility] ED with complaints of  progressive dyspnea x 2-3 weeks with lower extremity edema x1 week., found to have new-onset AF with HR-120s and new onset HFrEF with EF of 20%. Now s/p successful GENE guided DCCV on 9/27/21. Overnight had PAF up to 160's, currently in sinus rhythm.    1. New onset AF with RVR  2. New onset HFrEF w/ LVEF 20%    - Awaiting CTA heart r/o CAD  - c/w Eliquis 5mg BID  - c/w metoprolol   - Amiodarone 400mg PO x 1 STAT, will follow up with loading dose after speaking to EP attending.   -I discussed with patient plans for short term amiodarone including risks/side effects. Discussed need for outpatient monitoring of PFT/TFT/LFT/opthalmology monitoring while on amiodarone.    ELIAZAR Mariano NP-C  [phone]   55 year old male with no PMHx presented to [facility] ED with complaints of  progressive dyspnea x 2-3 weeks with lower extremity edema x1 week., found to have new-onset AF with HR-120s and new onset HFrEF with EF of 20%. Now s/p successful GENE guided DCCV on 9/27/21. Overnight had PAF up to 160's, currently in sinus rhythm.    1. New onset AF with RVR  2. New onset HFrEF w/ LVEF 20%    - Awaiting CTA heart r/o CAD  - c/w Eliquis 5mg BID  - c/w metoprolol   - Amiodarone 400mg PO x 1 STAT, will follow up with loading dose after speaking to EP attending.   -I discussed with patient plans for short term amiodarone including risks/side effects. Discussed need for outpatient monitoring of PFT/TFT/LFT/opthalmology monitoring while on amiodarone.    Addendum: Normal CTA of the coronary arteries. Patient can be discharged home from EP perspective on amiodarone 200mg TID x 1 week, then 200mg BID until follow up in one month. Our office will call patient tomorrow for the appointment.     ELIAZAR Mariano NP-C  [phone]

## 2021-09-29 PROBLEM — Z00.00 ENCOUNTER FOR PREVENTIVE HEALTH EXAMINATION: Status: ACTIVE | Noted: 2021-09-29

## 2021-10-11 ENCOUNTER — APPOINTMENT (OUTPATIENT)
Dept: ELECTROPHYSIOLOGY | Facility: CLINIC | Age: 56
End: 2021-10-11

## 2021-10-11 ENCOUNTER — APPOINTMENT (OUTPATIENT)
Dept: CARDIOLOGY | Facility: CLINIC | Age: 56
End: 2021-10-11

## 2021-10-18 ENCOUNTER — APPOINTMENT (OUTPATIENT)
Dept: INTERNAL MEDICINE | Facility: CLINIC | Age: 56
End: 2021-10-18

## 2021-11-05 ENCOUNTER — APPOINTMENT (OUTPATIENT)
Dept: ELECTROPHYSIOLOGY | Facility: CLINIC | Age: 56
End: 2021-11-05

## 2021-12-06 ENCOUNTER — APPOINTMENT (OUTPATIENT)
Dept: ELECTROPHYSIOLOGY | Facility: CLINIC | Age: 56
End: 2021-12-06

## 2022-07-19 DIAGNOSIS — I50.21 ACUTE SYSTOLIC (CONGESTIVE) HEART FAILURE: ICD-10-CM

## 2022-07-19 DIAGNOSIS — R60.0 LOCALIZED EDEMA: ICD-10-CM

## 2022-07-19 DIAGNOSIS — J90 PLEURAL EFFUSION, NOT ELSEWHERE CLASSIFIED: ICD-10-CM

## 2022-07-19 DIAGNOSIS — R06.02 SHORTNESS OF BREATH: ICD-10-CM

## 2022-07-19 DIAGNOSIS — Z91.19 PATIENT'S NONCOMPLIANCE WITH OTHER MEDICAL TREATMENT AND REGIMEN: ICD-10-CM

## 2022-07-21 ENCOUNTER — NON-APPOINTMENT (OUTPATIENT)
Age: 57
End: 2022-07-21

## 2022-07-21 ENCOUNTER — APPOINTMENT (OUTPATIENT)
Dept: CARDIOLOGY | Facility: CLINIC | Age: 57
End: 2022-07-21

## 2022-07-21 ENCOUNTER — OUTPATIENT (OUTPATIENT)
Dept: OUTPATIENT SERVICES | Facility: HOSPITAL | Age: 57
LOS: 1 days | End: 2022-07-21
Payer: COMMERCIAL

## 2022-07-21 VITALS
SYSTOLIC BLOOD PRESSURE: 147 MMHG | WEIGHT: 235 LBS | DIASTOLIC BLOOD PRESSURE: 111 MMHG | HEIGHT: 70 IN | BODY MASS INDEX: 33.64 KG/M2 | OXYGEN SATURATION: 98 % | HEART RATE: 104 BPM

## 2022-07-21 VITALS — SYSTOLIC BLOOD PRESSURE: 146 MMHG | DIASTOLIC BLOOD PRESSURE: 91 MMHG

## 2022-07-21 DIAGNOSIS — Z00.00 ENCOUNTER FOR GENERAL ADULT MEDICAL EXAMINATION WITHOUT ABNORMAL FINDINGS: ICD-10-CM

## 2022-07-21 DIAGNOSIS — I42.9 CARDIOMYOPATHY, UNSPECIFIED: ICD-10-CM

## 2022-07-21 DIAGNOSIS — I10 ESSENTIAL (PRIMARY) HYPERTENSION: ICD-10-CM

## 2022-07-21 PROCEDURE — 99215 OFFICE O/P EST HI 40 MIN: CPT | Mod: 25

## 2022-07-21 PROCEDURE — C8929: CPT

## 2022-07-21 PROCEDURE — 93000 ELECTROCARDIOGRAM COMPLETE: CPT

## 2022-07-21 PROCEDURE — 93306 TTE W/DOPPLER COMPLETE: CPT | Mod: 26

## 2022-07-21 NOTE — PHYSICAL EXAM
[Not Palpable] : not palpable [Tachycardia] : tachycardic [Irregularly Irregular] : irregularly irregular [Normal S1] : normal S1 [Normal S2] : normal S2 [Normal] : alert and oriented, normal memory

## 2022-08-04 ENCOUNTER — INPATIENT (INPATIENT)
Facility: HOSPITAL | Age: 57
LOS: 11 days | Discharge: HOME | DRG: 291 | End: 2022-08-16
Attending: INTERNAL MEDICINE | Admitting: INTERNAL MEDICINE
Payer: COMMERCIAL

## 2022-08-04 VITALS
RESPIRATION RATE: 17 BRPM | HEART RATE: 111 BPM | TEMPERATURE: 98 F | OXYGEN SATURATION: 98 % | SYSTOLIC BLOOD PRESSURE: 140 MMHG | HEIGHT: 70 IN | DIASTOLIC BLOOD PRESSURE: 83 MMHG

## 2022-08-04 DIAGNOSIS — I50.9 HEART FAILURE, UNSPECIFIED: ICD-10-CM

## 2022-08-04 LAB
ALBUMIN SERPL ELPH-MCNC: 3.9 G/DL — SIGNIFICANT CHANGE UP (ref 3.3–5)
ALP SERPL-CCNC: 107 U/L — SIGNIFICANT CHANGE UP (ref 40–120)
ALT FLD-CCNC: 28 U/L — SIGNIFICANT CHANGE UP (ref 10–45)
ANION GAP SERPL CALC-SCNC: 11 MMOL/L — SIGNIFICANT CHANGE UP (ref 5–17)
AST SERPL-CCNC: 29 U/L — SIGNIFICANT CHANGE UP (ref 10–40)
BASOPHILS # BLD AUTO: 0.03 K/UL — SIGNIFICANT CHANGE UP (ref 0–0.2)
BASOPHILS NFR BLD AUTO: 0.5 % — SIGNIFICANT CHANGE UP (ref 0–2)
BILIRUB SERPL-MCNC: 1.3 MG/DL — HIGH (ref 0.2–1.2)
BUN SERPL-MCNC: 18 MG/DL — SIGNIFICANT CHANGE UP (ref 7–23)
CALCIUM SERPL-MCNC: 8.6 MG/DL — SIGNIFICANT CHANGE UP (ref 8.4–10.5)
CHLORIDE SERPL-SCNC: 106 MMOL/L — SIGNIFICANT CHANGE UP (ref 96–108)
CO2 SERPL-SCNC: 23 MMOL/L — SIGNIFICANT CHANGE UP (ref 22–31)
CREAT SERPL-MCNC: 1.07 MG/DL — SIGNIFICANT CHANGE UP (ref 0.5–1.3)
EGFR: 82 ML/MIN/1.73M2 — SIGNIFICANT CHANGE UP
EOSINOPHIL # BLD AUTO: 0.09 K/UL — SIGNIFICANT CHANGE UP (ref 0–0.5)
EOSINOPHIL NFR BLD AUTO: 1.4 % — SIGNIFICANT CHANGE UP (ref 0–6)
GLUCOSE SERPL-MCNC: 84 MG/DL — SIGNIFICANT CHANGE UP (ref 70–99)
HCT VFR BLD CALC: 42.9 % — SIGNIFICANT CHANGE UP (ref 39–50)
HGB BLD-MCNC: 13.7 G/DL — SIGNIFICANT CHANGE UP (ref 13–17)
IMM GRANULOCYTES NFR BLD AUTO: 0.3 % — SIGNIFICANT CHANGE UP (ref 0–1.5)
LYMPHOCYTES # BLD AUTO: 1.22 K/UL — SIGNIFICANT CHANGE UP (ref 1–3.3)
LYMPHOCYTES # BLD AUTO: 18.5 % — SIGNIFICANT CHANGE UP (ref 13–44)
MAGNESIUM SERPL-MCNC: 2 MG/DL — SIGNIFICANT CHANGE UP (ref 1.6–2.6)
MCHC RBC-ENTMCNC: 30.2 PG — SIGNIFICANT CHANGE UP (ref 27–34)
MCHC RBC-ENTMCNC: 31.9 GM/DL — LOW (ref 32–36)
MCV RBC AUTO: 94.7 FL — SIGNIFICANT CHANGE UP (ref 80–100)
MONOCYTES # BLD AUTO: 0.58 K/UL — SIGNIFICANT CHANGE UP (ref 0–0.9)
MONOCYTES NFR BLD AUTO: 8.8 % — SIGNIFICANT CHANGE UP (ref 2–14)
NEUTROPHILS # BLD AUTO: 4.66 K/UL — SIGNIFICANT CHANGE UP (ref 1.8–7.4)
NEUTROPHILS NFR BLD AUTO: 70.5 % — SIGNIFICANT CHANGE UP (ref 43–77)
NRBC # BLD: 0 /100 WBCS — SIGNIFICANT CHANGE UP (ref 0–0)
NT-PROBNP SERPL-SCNC: 2105 PG/ML — HIGH (ref 0–300)
PLATELET # BLD AUTO: 234 K/UL — SIGNIFICANT CHANGE UP (ref 150–400)
POTASSIUM SERPL-MCNC: 4.2 MMOL/L — SIGNIFICANT CHANGE UP (ref 3.5–5.3)
POTASSIUM SERPL-SCNC: 4.2 MMOL/L — SIGNIFICANT CHANGE UP (ref 3.5–5.3)
PROT SERPL-MCNC: 6.7 G/DL — SIGNIFICANT CHANGE UP (ref 6–8.3)
RBC # BLD: 4.53 M/UL — SIGNIFICANT CHANGE UP (ref 4.2–5.8)
RBC # FLD: 13.9 % — SIGNIFICANT CHANGE UP (ref 10.3–14.5)
SARS-COV-2 RNA SPEC QL NAA+PROBE: DETECTED
SODIUM SERPL-SCNC: 140 MMOL/L — SIGNIFICANT CHANGE UP (ref 135–145)
TROPONIN T, HIGH SENSITIVITY RESULT: 21 NG/L — SIGNIFICANT CHANGE UP (ref 0–51)
WBC # BLD: 6.6 K/UL — SIGNIFICANT CHANGE UP (ref 3.8–10.5)
WBC # FLD AUTO: 6.6 K/UL — SIGNIFICANT CHANGE UP (ref 3.8–10.5)

## 2022-08-04 PROCEDURE — 71046 X-RAY EXAM CHEST 2 VIEWS: CPT | Mod: 26

## 2022-08-04 PROCEDURE — 99284 EMERGENCY DEPT VISIT MOD MDM: CPT

## 2022-08-04 RX ORDER — METOPROLOL TARTRATE 50 MG
50 TABLET ORAL ONCE
Refills: 0 | Status: COMPLETED | OUTPATIENT
Start: 2022-08-04 | End: 2022-08-04

## 2022-08-04 RX ORDER — FUROSEMIDE 40 MG
80 TABLET ORAL ONCE
Refills: 0 | Status: COMPLETED | OUTPATIENT
Start: 2022-08-04 | End: 2022-08-04

## 2022-08-04 RX ORDER — METOPROLOL TARTRATE 50 MG
5 TABLET ORAL ONCE
Refills: 0 | Status: COMPLETED | OUTPATIENT
Start: 2022-08-04 | End: 2022-08-04

## 2022-08-04 RX ADMIN — Medication 50 MILLIGRAM(S): at 19:36

## 2022-08-04 RX ADMIN — Medication 80 MILLIGRAM(S): at 19:12

## 2022-08-04 RX ADMIN — Medication 5 MILLIGRAM(S): at 19:40

## 2022-08-04 NOTE — ED PROVIDER NOTE - PROGRESS NOTE DETAILS
Spoke to inpatient cardiology team, stated that he will speak to Dr. Gustafson and give further recs. Likely admission.   - Ivan Huang PA-C Spoke to inpatient cards team, recs to admit to medicine, pt for IV diuresis and GDMT optimization. Labs/imaging reviewed with pt, aware of admission. Spoke to Dr. Valenzuela for admission, accepted pt.   - Ivan Huang PA-C

## 2022-08-04 NOTE — ED ADULT NURSE NOTE - NS ED NURSE REPORT GIVEN DT
The patient feels that the cataract is significantly impacting daily activities and has elected cataract surgery. The risks, benefits, and alternatives to surgery were discussed. The patient elects to proceed with surgery. 04-Aug-2022 23:10

## 2022-08-04 NOTE — ED ADULT NURSE REASSESSMENT NOTE - NS ED NURSE REASSESS COMMENT FT1
received report from GILLES Mckeon. pt is comfortable resting in stretcher with side rails up for safety and wife at bedside. pt is A&Ox3, spontaneous unlabored respirations, sating 98% on RA, tachycardic to 120s, hypertensive to 140s/100s. ED MD Reta made aware of vital signs, pt medicated as per MD orders. Pt placed on CCM. Patient admitted to Telemetry. Admission band applied to patient utilizing two patient identifiers. Patient and wife at bedside notified and updated on plan of care. Pending bed assignment.

## 2022-08-04 NOTE — ED PROVIDER NOTE - ATTENDING APP SHARED VISIT CONTRIBUTION OF CARE
Tito Mcduffie MD, FACEP: In this physician's medical judgement based on clinical history and physical exam: patient with shortness of breath, dyspnea on exertion, and paroxysmal nocturnal dyspnea, who has been historically non-complaint with his medications and is here for medical optimization.  bilateral lower extremity swelling  mild tachypnea  irregularly irregular  no rash/vesicles/petechiae  soft, no rebound/guarding  no gross deformity of extremities, no asymmetry  will get iv, cbc, cmp, ekg, cxr, ce, cardiac monitor, iv lasix prn via cards recommendation  Will follow up on labs, analgesia, imaging, reassess and disposition to the inpatient team as clinically indicated.  *The above represents an initial assessment/impression. Please refer to my progress notes below for potential changes in patient clinical course*   Patient endorsed to Dr. Valenzuela at the time of admission. Based on patient's history and physical exam, as well as the results of today's workup, I feel that patient warrants admission to the hospital for further workup/evaluation and continued management. I discussed the findings of today's workup with the patient and addressed the patient's questions and concerns. The patient was agreeable with admission. Our team spoke with the inpatient receiving team who accepted the patient for admission and subsequently took over the patient's care at the time of admission. The receiving team will follow up on pending labs, analgesia, any clinical imaging results, ancillary findings, reassess, and disposition as clinically indicated. Details of patient and plan conveyed to receiving physician team and conveyed back for understanding. There were no questions at this time about the patient's status, disposition, and plan. Patient's care to be taken over by receiving physician team at this time, all decisions regarding the progression of care will be made at their discretion.

## 2022-08-04 NOTE — ED PROVIDER NOTE - OBJECTIVE STATEMENT
55 yo male with PMHx of Afib s/p DCCV on Eliquis, HFrEF presented with 1 night of orthopnea and abdominal swelling. Pt , but saw cardiologist again 3 weeks ago and was given lisinopril, metoprolol succinate, Eliquis. Endorsed acute abdominal swelling, LE swelling and difficulty sleeping 2/2 orthopnea, felt better when sitting upright. Denied chest pain, SOB, fevers, chills. 55 yo male with PMHx of Afib s/p DCCV on Eliquis, HFrEF presented with orthopnea, lower abdominal swelling, b/l LE swelling for months, last saw cardiologist one month ago for TTE and was given ACE, beta blockers and Eliquis, but prior to this had not seen the cardiologist in over 1 year (during his admission when he was found to have new Afib). Stated that he was given one month of medication after discharge but finished medication in 10/2021 and did not follow up until 7/2022. Denied chest pain, SOB, fevers, chills. 57 yo male with PMHx of Afib s/p DCCV on Eliquis, HFrEF (EF 25%) presented with orthopnea, lower abdominal swelling, b/l LE swelling for months, last saw cardiologist one month ago for TTE and was given ACE, beta blockers and Eliquis, but prior to this had not seen the cardiologist in over 1 year (during his admission when he was found to have new Afib). Stated that he was given one month of medication after discharge but finished medication in 10/2021 and did not follow up until 7/2022. Last night described difficulty sleeping 2/2 orthopnea, called cardiologist who recommended to come to ED. Denied chest pain, SOB, fevers, chills.

## 2022-08-04 NOTE — ED ADULT NURSE NOTE - ALCOHOL PRE SCREEN (AUDIT - C)
- PCV13) 03/15/2019 (Originally 3/4/2008)    HIV screen  03/21/2019 (Originally 3/4/2004)    DTaP/Tdap/Td vaccine (1 - Tdap) 03/29/2019 (Originally 3/4/2008)       Health Maintenance   Topic Date Due    Flu vaccine (1) 03/14/2019 (Originally 9/1/2017)    Pneumococcal high risk (1 of 2 - PCV13) 03/15/2019 (Originally 3/4/2008)    HIV screen  03/21/2019 (Originally 3/4/2004)    DTaP/Tdap/Td vaccine (1 - Tdap) 03/29/2019 (Originally 3/4/2008)
file.  Patient voiced understanding and agreed to treatment plan. Electronically signed by Jacob Weston MD on 3/6/2018 at 1:30 PM    This note is created with a voice recognition program and while intend to generate a document that accurately reflects the content of the visit, no guarantee can be provided that every mistake has been identified and corrected by editing.
Statement Selected

## 2022-08-04 NOTE — H&P ADULT - ASSESSMENT
A/P     # Acute decompensated systolic heart failure :  -pt. didn't follow with his cardio as out pt. and ran out of meds   -started back on lasix 80 mg q 12 for now as per cardio recommendation      afib :   -on eliquis   rate controlled     # CHF :  -c/w BB and losartan 25 mg started   -will add aldactone on monuitor his renal fx and k     HLD :  -c/w statin     advance acre planning : d/w patient regarding advance directive. d/w him regarding Intubation / CPR if the need arise. he agrees for everything for now. Remain full code. Time spend 10 min

## 2022-08-04 NOTE — CONSULT NOTE ADULT - TIME BILLING
Education and counseling with patient and family member. Coordination of care with outpatient cardiologist.

## 2022-08-04 NOTE — ED PROVIDER NOTE - NS ED ATTENDING STATEMENT MOD
This was a shared visit with the BRENDON. I reviewed and verified the documentation and independently performed the documented:

## 2022-08-04 NOTE — CONSULT NOTE ADULT - ATTENDING COMMENTS
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 He had an ischemic evaluation with coronary CT showing Ca score 0 and normal coronaries. He was subsequently placed on metoprolol, amiodarone, and Eliquis and discharged but has since been lost to f/u and stopped taking all his medications until he saw Dr. Gustafson in 7/2021 at which point he was started on Eliquis, metoprolol, and lisinopril. Around this time, he started to notice a 10lb weight gain and exertional shortness of breath. He presented to ED today due to severe orthopnea and PND that woke him up.     On exam, his /83, HR 80s-100s Afib, satting 98 RA, +JVD, 2+ pitting edema up to b/l thighs in the dependent regions.  Labs significant for elevated BNP to 2105    #Acute on chronic systolic heart failure due to medication non-adherence  #atrial fibrillation  #Overweight    -give Lasix 80 IV now, pending response, can do lasix 80 IV daily vs. 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 He had an ischemic evaluation with coronary CT showing Ca score 0 and normal coronaries. He was subsequently placed on metoprolol, amiodarone, and Eliquis and discharged but has since been lost to f/u and stopped taking all his medications until he saw Dr. Gustafson in 7/2021 at which point he was started on Eliquis, metoprolol, and lisinopril. Around this time, he started to notice a 10lb weight gain and exertional shortness of breath. He presented to ED today due to severe orthopnea and PND that woke him up.     On exam, his /83, HR 80s-100s Afib, satting 98 RA, +JVD, 2+ pitting edema up to b/l thighs in the dependent regions.  Labs significant for elevated BNP to 2105    #Acute on chronic systolic heart failure due to medication non-adherence  #atrial fibrillation  #Overweight    -give Lasix 80 IV now, pending response, can do lasix 80 IV daily vs. BID, goal net negative 2L, monitor K/Mg, daily standing weights, strict Is/Os  -Continue Eliquis 5 bid  -continue home metoprolol succinate 50mg daily  -switch home lisinopril to losartan 25mg daily, eventual plan for entresto  -eventual plan for aldactone  -once adequately diuresed, likely over the weekend, plan for GENE/DCCV on Monday    Plan discussed with patient, family and team

## 2022-08-04 NOTE — ED PROVIDER NOTE - PHYSICAL EXAMINATION
CONSTITUTIONAL: Patient is awake, alert and oriented x 3. Patient is well appearing and in no acute distress.  HEAD: NC/AT  EYES: PERRL b/l, EOMI  NECK: supple, no LAD  LUNGS: CTAB, no increased WOB, no wheezing/rales appreciated  HEART: RRR.+S1S2, no murmurs appreciated  ABDOMEN: Soft, grossly distended, nontender, +fluid waves, no rebound or guarding.   EXTREMITY: WWP, 2+ pitting edema L>R  SKIN: No rash or lesions appreciated

## 2022-08-04 NOTE — ED ADULT NURSE NOTE - NSFALLRSKINDICATORS_ED_ALL_ED
[Joint Pain] : joint pain [Negative] : Heme/Lymph [Weight Gain (___ Lbs)] : no recent weight gain [[email or web address]] : back pain, left ankle pain no

## 2022-08-04 NOTE — CONSULT NOTE ADULT - ASSESSMENT
56Y M w/ PMH of Nicole rosales in 9/2021, HFrEF (EF: 25%; 7/21/22) presents to the ED with severe orthopnea last night along with dyspnea on exertion over 3 weeks      56Y M w/ PMH of A-fib w. dccv in 9/2021, HFrEF (EF: 25%; 7/21/22) presents to the ED with severe orthopnea last night along with dyspnea on exertion over 3 weeks.     #Acute decompensated HFrEF (EF: 25%) in setting of medication non-adherence   - IV Lasix 80 mg tonight; will consider increasing Lasix to BID starting tomorrow   - Resume home: Metoprolol succinate 50 mg QD   - Start Losartan 25 mg QD  - Will consider adding aldactone 25 mg tomorrow   - Strict I&O's, daily weights     #A-fib   - Resume home: Eliquis 5 mg   - Resume home: Metoprolol succinate 50 mg

## 2022-08-04 NOTE — H&P ADULT - HISTORY OF PRESENT ILLNESS
56Y M w/ PMH of A-fib w. dccv in 9/2021, HFrEF (EF: 25%; 7/21/22) presents to the ED with severe orthopnea last night along with dyspnea on exertion over 3 weeks. Last night, pt had to be propped up due to orthopnea until he was able to catch his breath. He was unable to sleep last night. Intermittently over the past 3 weeks, pt has noticed decreased exercise tolerance (able to walk 2 blocks) due to SOB. Pt was hospitalized in 9/21 and found to have A-fib w. dccv and discharged with home meds. Stated that he was given one month of medication after discharge but finished medication in 10/2021 and did not follow up until 7/2022 with Dr. Gustafson for SOB. Denies CP, palpitations, diaphoresis, headache, nausea, vomiting, changes in BM, blurry vision, or dysuria.

## 2022-08-04 NOTE — CONSULT NOTE ADULT - SUBJECTIVE AND OBJECTIVE BOX
Patient is a 56y old  Male who presents with a chief complaint of     HPI:      PAST MEDICAL & SURGICAL HISTORY:  No pertinent past medical history          ECHO  FINDINGS:      MEDICATIONS  (STANDING):    MEDICATIONS  (PRN):      FAMILY HISTORY:          REVIEW OF SYSTEMS      General:	  Ophthalmologic:  ENMT:	  Respiratory and Thorax:  Cardiovascular:	  Gastrointestinal:	  Genitourinary:	  Musculoskeletal:	  Neurological:	  Psychiatric:			    SOCIAL HISTORY: Lives with wife in [place] and works as a . No recent travel and reports reduced exercise tolerance.     CIGARETTES: Occasional cigarettes "here and there" with 1 pack lasting months. Stopped smoking completely 4 months ago    ALCOHOL: Occasional. Last drink > 4 months ago    Vital Signs Last 24 Hrs  T(C): 37.2 (04 Aug 2022 15:58), Max: 37.2 (04 Aug 2022 15:58)  T(F): 98.9 (04 Aug 2022 15:58), Max: 98.9 (04 Aug 2022 15:58)  HR: 94 (04 Aug 2022 15:58) (94 - 111)  BP: 156/86 (04 Aug 2022 15:58) (140/83 - 156/86)  BP(mean): 109 (04 Aug 2022 15:58) (109 - 109)  RR: 20 (04 Aug 2022 15:58) (17 - 20)  SpO2: 98% (04 Aug 2022 15:58) (98% - 98%)    Parameters below as of 04 Aug 2022 15:58  Patient On (Oxygen Delivery Method): room air        PHYSICAL EXAM:      Constitutional:    Eyes:    ENMT:    Neck:    Breasts:    Back:    Respiratory:    Cardiovascular:    Gastrointestinal:    Genitourinary:    Rectal:    Extremities:    Vascular:    Neurological:    Skin:    Lymph Nodes:    Musculoskeletal:    Psychiatric:          ECG:    I&O's Detail      LABS:                  I&O's Summary    BNP  RADIOLOGY & ADDITIONAL STUDIES: Patient is a 56y old  Male who presents with a chief complaint of SOB for 3 weeks     HPI: 56Y M w/ PMH of A-fib w. dccv in 9/2021, HFrEF (EF: 25%; 7/21/22) presents to the ED with severe orthopnea last night along with dyspnea on exertion over 3 weeks. Last night, pt had to be propped up due to orthopnea until he was able to catch his breath. He was unable to sleep last night. Intermittently over the past 3 weeks, pt has noticed decreased exercise tolerance (able to walk 2 blocks) due to SOB. Denies CP, palpitations, diaphoresis, headache, nausea, vomiting, changes in BM, blurry vision, or dysuria.       PAST MEDICAL & SURGICAL HISTORY:  As per HPI         ECHO  FINDINGS:  < from: TTE with Doppler (w/Cont) (07.21.22 @ 18:02) >  Conclusions:  1. Tethered mitral valve leaflets with normal opening. Mild  mitral regurgitation.  2. Normal trileaflet aortic valve. Minimal aortic  regurgitation.  3. Moderately dilated left atrium.  LA volume index = 45  cc/m2.  4. Moderate concentric left ventricular hypertrophy.  5. Endocardial visualization enhanced with intravenous  injection of Ultrasonic Enhancing Agent (Definity). Severe  global left ventricular systolic dysfunction. No left  ventricular thrombus.  6. At least mild diastolic dysfunction (Stage I), unable to  fully evaluate diastolic function in the presence of  arrhythmia.  7. Normal right ventricular size with decreased right  ventricular systolic function.  8. Normal tricuspid valve. Moderate tricuspid  regurgitation.  *** Compared with echocardiogram of 9/27/2021, no  significant changes noted.      MEDICATIONS  (STANDING):    MEDICATIONS  (PRN):      FAMILY HISTORY:  Mother - HTN, asthma       REVIEW OF SYSTEMS  General: no fevers or chills	  Ophthalmologic: no blurry vision   ENMT: no difficulty hearing   Respiratory and Thorax: + dyspnea on exertion w. orthopnea   Cardiovascular: no cp   Gastrointestinal:	no diarrhea or constipation   Genitourinary: no dysuria   Musculoskeletal:	 no muscle weakness   Neurological: A&O x 3	  Psychiatric: normal affect 			    SOCIAL HISTORY: Lives with wife in [place] and works as a . No recent travel and reports reduced exercise tolerance over 3 weeks.     CIGARETTES: Occasional cigarettes "here and there" with 1 pack lasting months. Stopped smoking completely 4 months ago.     ALCOHOL: Occasional. Last drink > 4 months ago    Vital Signs Last 24 Hrs  T(C): 37.2 (04 Aug 2022 15:58), Max: 37.2 (04 Aug 2022 15:58)  T(F): 98.9 (04 Aug 2022 15:58), Max: 98.9 (04 Aug 2022 15:58)  HR: 94 (04 Aug 2022 15:58) (94 - 111)  BP: 156/86 (04 Aug 2022 15:58) (140/83 - 156/86)  BP(mean): 109 (04 Aug 2022 15:58) (109 - 109)  RR: 20 (04 Aug 2022 15:58) (17 - 20)  SpO2: 98% (04 Aug 2022 15:58) (98% - 98%)    Parameters below as of 04 Aug 2022 15:58  Patient On (Oxygen Delivery Method): room air      PHYSICAL EXAM  Constitutional: Obese male in no acute distress   Eyes: no sclera icterus   Neck: +JVP   Respiratory: crackles in b/l lung bases   Cardiovascular: irregularly irregular rate and rhythm; no murmurs or rubs   Gastrointestinal: soft, +distended, non-tender, no guarding   Extremities: 2+ pitting edema b/l up to thigh   Vascular: 2+ pulses b/l  Neurological: no focal deficits   Skin: no rashes or lesions   Musculoskeletal: no weakness   Psychiatric: normal affect           ECG:    I&O's Detail      LABS:                  I&O's Summary    BNP   Patient is a 56y old  Male who presents with a chief complaint of SOB for 3 weeks     HPI: 56Y M w/ PMH of A-fib w. dccv in 9/2021, HFrEF (EF: 25%; 7/21/22) presents to the ED with severe orthopnea last night along with dyspnea on exertion over 3 weeks. Last night, pt had to be propped up due to orthopnea until he was able to catch his breath. He was unable to sleep last night. Intermittently over the past 3 weeks, pt has noticed decreased exercise tolerance (able to walk 2 blocks) due to SOB. Pt was hospitalized in 9/21 and found to have A-fib w. dccv and discharged with home meds. Stated that he was given one month of medication after discharge but finished medication in 10/2021 and did not follow up until 7/2022 with Dr. Gustafson for SOB. Denies CP, palpitations, diaphoresis, headache, nausea, vomiting, changes in BM, blurry vision, or dysuria.       PAST MEDICAL & SURGICAL HISTORY:  As per HPI         ECHO  FINDINGS:  < from: TTE with Doppler (w/Cont) (07.21.22 @ 18:02) >  Conclusions:  1. Tethered mitral valve leaflets with normal opening. Mild  mitral regurgitation.  2. Normal trileaflet aortic valve. Minimal aortic  regurgitation.  3. Moderately dilated left atrium.  LA volume index = 45  cc/m2.  4. Moderate concentric left ventricular hypertrophy.  5. Endocardial visualization enhanced with intravenous  injection of Ultrasonic Enhancing Agent (Definity). Severe  global left ventricular systolic dysfunction. No left  ventricular thrombus.  6. At least mild diastolic dysfunction (Stage I), unable to  fully evaluate diastolic function in the presence of  arrhythmia.  7. Normal right ventricular size with decreased right  ventricular systolic function.  8. Normal tricuspid valve. Moderate tricuspid  regurgitation.  *** Compared with echocardiogram of 9/27/2021, no  significant changes noted.      MEDICATIONS  (STANDING):    MEDICATIONS  (PRN):      FAMILY HISTORY:  Mother - HTN, asthma       REVIEW OF SYSTEMS  General: no fevers or chills	  Ophthalmologic: no blurry vision   ENMT: no difficulty hearing   Respiratory and Thorax: + dyspnea on exertion w. orthopnea   Cardiovascular: no cp   Gastrointestinal:	no diarrhea or constipation   Genitourinary: no dysuria   Musculoskeletal:	 no muscle weakness   Neurological: A&O x 3	  Psychiatric: normal affect 			    SOCIAL HISTORY: Lives with wife in [place] and works as a . No recent travel and reports reduced exercise tolerance over 3 weeks.     CIGARETTES: Occasional cigarettes "here and there" with 1 pack lasting months. Stopped smoking completely 4 months ago.     ALCOHOL: Occasional. Last drink > 4 months ago    Vital Signs Last 24 Hrs  T(C): 37.2 (04 Aug 2022 15:58), Max: 37.2 (04 Aug 2022 15:58)  T(F): 98.9 (04 Aug 2022 15:58), Max: 98.9 (04 Aug 2022 15:58)  HR: 94 (04 Aug 2022 15:58) (94 - 111)  BP: 156/86 (04 Aug 2022 15:58) (140/83 - 156/86)  BP(mean): 109 (04 Aug 2022 15:58) (109 - 109)  RR: 20 (04 Aug 2022 15:58) (17 - 20)  SpO2: 98% (04 Aug 2022 15:58) (98% - 98%)    Parameters below as of 04 Aug 2022 15:58  Patient On (Oxygen Delivery Method): room air      PHYSICAL EXAM  Constitutional: Obese male in no acute distress   Eyes: no sclera icterus   Neck: +JVP   Respiratory: crackles in b/l lung bases   Cardiovascular: irregularly irregular rate and rhythm; no murmurs or rubs   Gastrointestinal: soft, +distended, non-tender, no guarding   Extremities: 2+ pitting edema b/l up to thigh   Vascular: 2+ pulses b/l  Neurological: no focal deficits   Skin: no rashes or lesions   Musculoskeletal: no weakness   Psychiatric: normal affect           ECG:    I&O's Detail      LABS:                  I&O's Summary    BNP   Patient is a 56y old  Male who presents with a chief complaint of SOB for 3 weeks     HPI: 56Y M w/ PMH of A-fib w. dccv in 9/2021, HFrEF (EF: 25%; 7/21/22) presents to the ED with severe orthopnea last night along with dyspnea on exertion over 3 weeks. Last night, pt had to be propped up due to orthopnea until he was able to catch his breath. He was unable to sleep last night. Intermittently over the past 3 weeks, pt has noticed decreased exercise tolerance (able to walk 2 blocks) due to SOB. Pt was hospitalized in 9/21 and found to have A-fib w. dccv and discharged with home meds. Stated that he was given one month of medication after discharge but finished medication in 10/2021 and did not follow up until 7/2022 with Dr. Gustafson for SOB. Denies CP, palpitations, diaphoresis, headache, nausea, vomiting, changes in BM, blurry vision, or dysuria.       PAST MEDICAL & SURGICAL HISTORY:  As per HPI         ECHO  FINDINGS:  < from: TTE with Doppler (w/Cont) (07.21.22 @ 18:02) >  Conclusions:  1. Tethered mitral valve leaflets with normal opening. Mild  mitral regurgitation.  2. Normal trileaflet aortic valve. Minimal aortic  regurgitation.  3. Moderately dilated left atrium.  LA volume index = 45  cc/m2.  4. Moderate concentric left ventricular hypertrophy.  5. Endocardial visualization enhanced with intravenous  injection of Ultrasonic Enhancing Agent (Definity). Severe  global left ventricular systolic dysfunction. No left  ventricular thrombus.  6. At least mild diastolic dysfunction (Stage I), unable to  fully evaluate diastolic function in the presence of  arrhythmia.  7. Normal right ventricular size with decreased right  ventricular systolic function.  8. Normal tricuspid valve. Moderate tricuspid  regurgitation.  *** Compared with echocardiogram of 9/27/2021, no  significant changes noted.      MEDICATIONS  (STANDING):    MEDICATIONS  (PRN):      FAMILY HISTORY:  Mother - HTN, asthma       REVIEW OF SYSTEMS  General: no fevers or chills	  Ophthalmologic: no blurry vision   ENMT: no difficulty hearing   Respiratory and Thorax: + dyspnea on exertion w. orthopnea   Cardiovascular: no cp   Gastrointestinal:	no diarrhea or constipation   Genitourinary: no dysuria   Musculoskeletal:	 no muscle weakness   Neurological: A&O x 3	  Psychiatric: normal affect 			    SOCIAL HISTORY: Lives with wife in [place] and works as a . No recent travel and reports reduced exercise tolerance over 3 weeks.     CIGARETTES: Occasional cigarettes "here and there" with 1 pack lasting months. Stopped smoking completely 4 months ago.     ALCOHOL: Occasional. Last drink > 4 months ago    Vital Signs Last 24 Hrs  T(C): 37.2 (04 Aug 2022 15:58), Max: 37.2 (04 Aug 2022 15:58)  T(F): 98.9 (04 Aug 2022 15:58), Max: 98.9 (04 Aug 2022 15:58)  HR: 94 (04 Aug 2022 15:58) (94 - 111)  BP: 156/86 (04 Aug 2022 15:58) (140/83 - 156/86)  BP(mean): 109 (04 Aug 2022 15:58) (109 - 109)  RR: 20 (04 Aug 2022 15:58) (17 - 20)  SpO2: 98% (04 Aug 2022 15:58) (98% - 98%)    Parameters below as of 04 Aug 2022 15:58  Patient On (Oxygen Delivery Method): room air      PHYSICAL EXAM  Constitutional: Obese male in no acute distress   Eyes: no sclera icterus   Neck: +JVP   Respiratory: crackles in b/l lung bases   Cardiovascular: irregularly irregular rate and rhythm; no murmurs or rubs   Gastrointestinal: soft, +distended, non-tender, no guarding   Extremities: 2+ pitting edema b/l up to thigh   Vascular: 2+ pulses b/l  Neurological: no focal deficits   Skin: no rashes or lesions   Musculoskeletal: no weakness   Psychiatric: normal affect     ECG: atrial fibrillation      LABS:  Reviewed

## 2022-08-04 NOTE — ED ADULT NURSE NOTE - OBJECTIVE STATEMENT
patient received alert & oriented x3. Came from home accompanied by wife complaining of sob on exertion x 3 weeks & worsening this past 2 days especially when walking 2 blocks. Unable to sleep well last night as can't get a comfortable position & to breath better. Denies chest pain, dizziness, sob & fever. Patient also noted gaining weight & feeling congested, on & off dry cough.

## 2022-08-04 NOTE — H&P ADULT - NSHPPHYSICALEXAM_GEN_ALL_CORE
Vital Signs Last 24 Hrs  T(C): 37.2 (04 Aug 2022 15:58), Max: 37.2 (04 Aug 2022 15:58)  T(F): 98.9 (04 Aug 2022 15:58), Max: 98.9 (04 Aug 2022 15:58)  HR: 94 (04 Aug 2022 15:58) (94 - 111)  BP: 156/86 (04 Aug 2022 15:58) (140/83 - 156/86)  BP(mean): 109 (04 Aug 2022 15:58) (109 - 109)  RR: 20 (04 Aug 2022 15:58) (17 - 20)  SpO2: 98% (04 Aug 2022 15:58) (98% - 98%)    Parameters below as of 04 Aug 2022 15:58  Patient On (Oxygen Delivery Method): room air pt. seen and examined     Vital Signs Last 24 Hrs  T(C): 37.2 (04 Aug 2022 15:58), Max: 37.2 (04 Aug 2022 15:58)  T(F): 98.9 (04 Aug 2022 15:58), Max: 98.9 (04 Aug 2022 15:58)  HR: 94 (04 Aug 2022 15:58) (94 - 111)  BP: 156/86 (04 Aug 2022 15:58) (140/83 - 156/86)  BP(mean): 109 (04 Aug 2022 15:58) (109 - 109)  RR: 20 (04 Aug 2022 15:58) (17 - 20)  SpO2: 98% (04 Aug 2022 15:58) (98% - 98%)    Parameters below as of 04 Aug 2022 15:58  Patient On (Oxygen Delivery Method): room air    heent : nc/at , no pallor  neck : supple, no JVD  lungs : B/L basilar rales   Heart: s1s2 nl, irreg  abd : soft, NABS, NT/ND  ext : edema + , no c/c , pulses 2 +  neuro: aaox3 , no focal deficit

## 2022-08-04 NOTE — ED PROVIDER NOTE - CADM POA URETHRAL CATHETER
Received request for Fluoxetine 40mg   Rx last refilled on 04/03/2019 (dispense report) for 30 day w-4 RF  Refill refused   No

## 2022-08-04 NOTE — ED PROVIDER NOTE - CLINICAL SUMMARY MEDICAL DECISION MAKING FREE TEXT BOX
55 yo male with PMHx of Afib s/p DCCV on Eliquis, HFrEF presented with orthopnea, lower abdominal swelling, b/l LE swelling for months, last saw cardiologist one month ago for TTE and was given ACE, beta blockers and Eliquis, but prior to this had not seen the cardiologist in over 1 year (during his admission when he was found to have new Afib). Stated that he was given one month of medication after discharge but finished medication in 10/2021 and did not follow up until 7/2022. VSS, on exam with grossly distended abdomen, bilateral LE swelling - presentation likely ADHF iso medication non-compliance. Sent in by outpatient cardiologist, Dr. Gustafson for diuresis, cards already aware, will touch base, get labs, imaging, diuresis per cards recs.

## 2022-08-05 LAB
ALBUMIN SERPL ELPH-MCNC: 3.8 G/DL — SIGNIFICANT CHANGE UP (ref 3.3–5)
ALP SERPL-CCNC: 106 U/L — SIGNIFICANT CHANGE UP (ref 40–120)
ALT FLD-CCNC: 31 U/L — SIGNIFICANT CHANGE UP (ref 10–45)
ANION GAP SERPL CALC-SCNC: 11 MMOL/L — SIGNIFICANT CHANGE UP (ref 5–17)
AST SERPL-CCNC: 28 U/L — SIGNIFICANT CHANGE UP (ref 10–40)
BILIRUB SERPL-MCNC: 1.4 MG/DL — HIGH (ref 0.2–1.2)
BUN SERPL-MCNC: 18 MG/DL — SIGNIFICANT CHANGE UP (ref 7–23)
CALCIUM SERPL-MCNC: 8.5 MG/DL — SIGNIFICANT CHANGE UP (ref 8.4–10.5)
CHLORIDE SERPL-SCNC: 102 MMOL/L — SIGNIFICANT CHANGE UP (ref 96–108)
CO2 SERPL-SCNC: 30 MMOL/L — SIGNIFICANT CHANGE UP (ref 22–31)
CREAT SERPL-MCNC: 1.1 MG/DL — SIGNIFICANT CHANGE UP (ref 0.5–1.3)
EGFR: 79 ML/MIN/1.73M2 — SIGNIFICANT CHANGE UP
GLUCOSE SERPL-MCNC: 95 MG/DL — SIGNIFICANT CHANGE UP (ref 70–99)
HCT VFR BLD CALC: 42.2 % — SIGNIFICANT CHANGE UP (ref 39–50)
HGB BLD-MCNC: 13.7 G/DL — SIGNIFICANT CHANGE UP (ref 13–17)
MCHC RBC-ENTMCNC: 30.4 PG — SIGNIFICANT CHANGE UP (ref 27–34)
MCHC RBC-ENTMCNC: 32.5 GM/DL — SIGNIFICANT CHANGE UP (ref 32–36)
MCV RBC AUTO: 93.6 FL — SIGNIFICANT CHANGE UP (ref 80–100)
NRBC # BLD: 0 /100 WBCS — SIGNIFICANT CHANGE UP (ref 0–0)
PLATELET # BLD AUTO: 226 K/UL — SIGNIFICANT CHANGE UP (ref 150–400)
POTASSIUM SERPL-MCNC: 3.6 MMOL/L — SIGNIFICANT CHANGE UP (ref 3.5–5.3)
POTASSIUM SERPL-SCNC: 3.6 MMOL/L — SIGNIFICANT CHANGE UP (ref 3.5–5.3)
PROT SERPL-MCNC: 6.5 G/DL — SIGNIFICANT CHANGE UP (ref 6–8.3)
RBC # BLD: 4.51 M/UL — SIGNIFICANT CHANGE UP (ref 4.2–5.8)
RBC # FLD: 13.8 % — SIGNIFICANT CHANGE UP (ref 10.3–14.5)
SODIUM SERPL-SCNC: 143 MMOL/L — SIGNIFICANT CHANGE UP (ref 135–145)
WBC # BLD: 4.66 K/UL — SIGNIFICANT CHANGE UP (ref 3.8–10.5)
WBC # FLD AUTO: 4.66 K/UL — SIGNIFICANT CHANGE UP (ref 3.8–10.5)

## 2022-08-05 PROCEDURE — 93010 ELECTROCARDIOGRAM REPORT: CPT

## 2022-08-05 PROCEDURE — 99233 SBSQ HOSP IP/OBS HIGH 50: CPT

## 2022-08-05 RX ORDER — ASPIRIN/CALCIUM CARB/MAGNESIUM 324 MG
81 TABLET ORAL DAILY
Refills: 0 | Status: DISCONTINUED | OUTPATIENT
Start: 2022-08-05 | End: 2022-08-16

## 2022-08-05 RX ORDER — FUROSEMIDE 40 MG
80 TABLET ORAL
Refills: 0 | Status: DISCONTINUED | OUTPATIENT
Start: 2022-08-05 | End: 2022-08-05

## 2022-08-05 RX ORDER — LOSARTAN POTASSIUM 100 MG/1
25 TABLET, FILM COATED ORAL DAILY
Refills: 0 | Status: DISCONTINUED | OUTPATIENT
Start: 2022-08-05 | End: 2022-08-05

## 2022-08-05 RX ORDER — FUROSEMIDE 40 MG
80 TABLET ORAL DAILY
Refills: 0 | Status: DISCONTINUED | OUTPATIENT
Start: 2022-08-06 | End: 2022-08-08

## 2022-08-05 RX ORDER — LISINOPRIL 2.5 MG/1
10 TABLET ORAL DAILY
Refills: 0 | Status: DISCONTINUED | OUTPATIENT
Start: 2022-08-05 | End: 2022-08-05

## 2022-08-05 RX ORDER — SPIRONOLACTONE 25 MG/1
25 TABLET, FILM COATED ORAL DAILY
Refills: 0 | Status: DISCONTINUED | OUTPATIENT
Start: 2022-08-05 | End: 2022-08-16

## 2022-08-05 RX ORDER — SACUBITRIL AND VALSARTAN 24; 26 MG/1; MG/1
1 TABLET, FILM COATED ORAL
Refills: 0 | Status: DISCONTINUED | OUTPATIENT
Start: 2022-08-05 | End: 2022-08-08

## 2022-08-05 RX ORDER — ATORVASTATIN CALCIUM 80 MG/1
40 TABLET, FILM COATED ORAL AT BEDTIME
Refills: 0 | Status: DISCONTINUED | OUTPATIENT
Start: 2022-08-05 | End: 2022-08-16

## 2022-08-05 RX ORDER — APIXABAN 2.5 MG/1
5 TABLET, FILM COATED ORAL
Refills: 0 | Status: DISCONTINUED | OUTPATIENT
Start: 2022-08-05 | End: 2022-08-16

## 2022-08-05 RX ORDER — METOPROLOL TARTRATE 50 MG
50 TABLET ORAL DAILY
Refills: 0 | Status: DISCONTINUED | OUTPATIENT
Start: 2022-08-05 | End: 2022-08-16

## 2022-08-05 RX ADMIN — Medication 81 MILLIGRAM(S): at 12:30

## 2022-08-05 RX ADMIN — APIXABAN 5 MILLIGRAM(S): 2.5 TABLET, FILM COATED ORAL at 06:01

## 2022-08-05 RX ADMIN — SACUBITRIL AND VALSARTAN 1 TABLET(S): 24; 26 TABLET, FILM COATED ORAL at 18:59

## 2022-08-05 RX ADMIN — ATORVASTATIN CALCIUM 40 MILLIGRAM(S): 80 TABLET, FILM COATED ORAL at 21:57

## 2022-08-05 RX ADMIN — APIXABAN 5 MILLIGRAM(S): 2.5 TABLET, FILM COATED ORAL at 18:59

## 2022-08-05 RX ADMIN — Medication 50 MILLIGRAM(S): at 06:01

## 2022-08-05 RX ADMIN — LOSARTAN POTASSIUM 25 MILLIGRAM(S): 100 TABLET, FILM COATED ORAL at 06:01

## 2022-08-05 RX ADMIN — Medication 80 MILLIGRAM(S): at 06:01

## 2022-08-05 NOTE — PROGRESS NOTE ADULT - ASSESSMENT
56Y M w/ PMH of A-fib w. dccv in 9/2021, HFrEF (EF: 25%; 7/21/22) presents to the ED with severe orthopnea last night along with dyspnea on exertion over 3 weeks.     #Acute decompensated HFrEF (EF: 25%) in setting of medication non-adherence   - IV Lasix 80 mg BID  - Resume home: Metoprolol succinate 50 mg QD   - Switch Losartan 25 mg QD to Entresto 24-26 mg for this evening   - Start Aldactone 25 mg   - Strict I&O's, daily weights     #A-fib   - Resume home: Eliquis 5 mg   - Resume home: Metoprolol succinate 50 mg        56Y M w/ PMH of A-fib w. dccv in 9/2021, HFrEF (EF: 25%; 7/21/22) presents to the ED with severe orthopnea last night along with dyspnea on exertion over 3 weeks.     #Acute decompensated HFrEF (EF: 25%) in setting of medication non-adherence   - IV Lasix 80 mg BID --> IV Lasix 80 mg QD  - Resume home: Metoprolol succinate 50 mg QD   - D/C Losartan 25 mg QD and Start Entresto 24-26 mg for this evening   - Start Aldactone 25 mg   - Strict I&O's, daily weights     #A-fib   - Resume home: Eliquis 5 mg   - Resume home: Metoprolol succinate 50 mg        56Y M w/ PMH of [facility] w[facility] in 9/20[facility], HFrEF (EF: 25%; 7/21/22) presents to the ED with severe orthopnea last night along with dyspnea on exertion over 3 weeks.     #Acute decompensated HFrEF (EF: 25%) in setting of medication non-adherence   - IV Lasix 80 mg BID --> IV Lasix 80 mg QD  - C/w Metoprolol succinate 50 mg QD   - D/C Losartan 25 mg QD and Start Entresto 24-26 mg for this evening   - Start Aldactone 25 mg QD  - Strict I&O's, daily weights     #A-fib   - C/w Eliquis 5 mg BID  - C/w Metoprolol succinate 50 mg QD        56Y M w/ PMH of [facility] w. [facility] in 9/20[facility], HFrEF (EF: 25%; 7/21/22) presents to the ED with severe orthopnea last night along with dyspnea on exertion over 3 weeks.     #Acute decompensated HFrEF (EF: 25%) in setting of medication non-adherence   - IV Lasix 80 mg BID --> IV Lasix 80 mg QD  - C/w Metoprolol succinate 50 mg QD   - D/C Losartan 25 mg QD and Start Entresto 24-26 mg BID this evening   - Start Aldactone 25 mg QD  - Strict I&O's, daily weights     #A-fib   - C/w Eliquis 5 mg BID  - C/w Metoprolol succinate 50 mg QD

## 2022-08-05 NOTE — PROGRESS NOTE ADULT - SUBJECTIVE AND OBJECTIVE BOX
Patient is a 56y old  Male who presents with a chief complaint of SOB (05 Aug 2022 06:48)      SUBJECTIVE / OVERNIGHT EVENTS: No acute events overnight. Pt was seen and examined at bedside this morning. Pt endorses significantly improved orthopnea with no difficulty sleeping overnight. Pt also notes improved dyspnea on exertion and subjective improvement of lower extremity edema. Denies CP, SOB, palpitations, headache, nausea, vomiting, changes in BM, or dysuria.     MEDICATIONS  (STANDING):  apixaban 5 milliGRAM(s) Oral two times a day  aspirin enteric coated 81 milliGRAM(s) Oral daily  atorvastatin 40 milliGRAM(s) Oral at bedtime  furosemide   Injectable 80 milliGRAM(s) IV Push two times a day  losartan 25 milliGRAM(s) Oral daily  metoprolol succinate ER 50 milliGRAM(s) Oral daily    MEDICATIONS  (PRN):      Vital Signs Last 24 Hrs  T(C): 36.8 (05 Aug 2022 05:55), Max: 37.2 (04 Aug 2022 15:58)  T(F): 98.3 (05 Aug 2022 05:55), Max: 98.9 (04 Aug 2022 15:58)  HR: 98 (05 Aug 2022 05:55) (94 - 125)  BP: 150/102 (05 Aug 2022 05:55) (131/94 - 156/86)  BP(mean): 116 (04 Aug 2022 19:20) (109 - 116)  RR: 18 (05 Aug 2022 05:55) (17 - 20)  SpO2: 94% (05 Aug 2022 05:55) (94% - 98%)    Parameters below as of 05 Aug 2022 05:55  Patient On (Oxygen Delivery Method): room air      CAPILLARY BLOOD GLUCOSE        I&O's Summary    04 Aug 2022 07:01  -  05 Aug 2022 07:00  --------------------------------------------------------  IN: 0 mL / OUT: 0 mL / NET: 0 mL        PHYSICAL EXAM:  GENERAL: NAD, well-developed  HEAD:  Atraumatic, Normocephalic  EYES:  conjunctiva and sclera clear  NECK: Supple, No JVD  CHEST/LUNG: Mild crackles in b/l lung bases, no wheezing  HEART: Irregularly irregular rate and rhythm, no murmurs, rubs, or gallops  ABDOMEN: Soft, Nontender, Nondistended; Bowel sounds present  EXTREMITIES:  2+ piting edema LE b/l   PSYCH: AAOx3  NEUROLOGY: non-focal  SKIN: No rashes or lesions    LABS:                        13.7   4.66  )-----------( 226      ( 05 Aug 2022 09:16 )             42.2     08-05    143  |  102  |  18  ----------------------------<  95  3.6   |  30  |  1.10    Ca    8.5      05 Aug 2022 09:16  Phos  3.4     08-04  Mg     2.0     08-04    TPro  6.5  /  Alb  3.8  /  TBili  1.4<H>  /  DBili  x   /  AST  28  /  ALT  31  /  AlkPhos  106  08-05

## 2022-08-05 NOTE — PROGRESS NOTE ADULT - ATTENDING COMMENTS
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 He had an ischemic evaluation with coronary CT showing Ca score 0 and normal coronaries. He was subsequently placed on metoprolol, amiodarone, and Eliquis and discharged but has since been lost to f/u and stopped taking all his medications until he saw Dr. Gustafson in 7/2021 at which point he was started on Eliquis, metoprolol, and lisinopril. Around this time, he started to notice a 10lb weight gain and exertional shortness of breath. He presented to ED today due to severe orthopnea and PND that woke him up.     On initial exam, his /83, HR 80s-100s Afib, satting 98 RA, +JVD, 2+ pitting edema up to b/l thighs in the dependent regions.  Labs significant for elevated BNP to 2105. He was incidentally found to have COVID.    Although no Is/Os or weights recorded, he has less edema on exam and less JVD today. He reports less SOB.    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 He had an ischemic evaluation with coronary CT showing Ca score 0 and normal coronaries. He was subsequently placed on metoprolol, amiodarone, and Eliquis and discharged but has since been lost to f/u and stopped taking all his medications until he saw Dr. Gustafson in 7/2021 at which point he was started on Eliquis, metoprolol, and lisinopril. Around this time, he started to notice a 10lb weight gain and exertional shortness of breath. He presented to ED today due to severe orthopnea and PND that woke him up.     On initial exam, his /83, HR 80s-100s Afib, satting 98 RA, +JVD, 2+ pitting edema up to b/l thighs in the dependent regions.  Labs significant for elevated BNP to 2105. He was incidentally found to have COVID.    Although no Is/Os or weights recorded, he has less edema on exam and less JVD today. He reports less SOB.    #Acute on chronic systolic heart failure due to medication non-adherence  #atrial fibrillation  #Overweight    -decrease lasix to 80 IV once daily, goal net neg 1-2L, monitor K/Mg, daily standing weights, strict Is/Os  -Continue Eliquis 5 bid  -continue home metoprolol succinate 50mg daily  -switch losartan to entresto 24/26 bid starting tonight  -start aldactone 25mg daily  -once euvolemic, will consider GENE/DCCV, but given COVID status, might have to defer till he is off isolation/outpatient

## 2022-08-05 NOTE — PATIENT PROFILE ADULT - FALL HARM RISK - HARM RISK INTERVENTIONS

## 2022-08-05 NOTE — PROGRESS NOTE ADULT - SUBJECTIVE AND OBJECTIVE BOX
Patient is a 56y old  Male who presents with a chief complaint of SOB (05 Aug 2022 06:48)      INTERVAL HPI/OVERNIGHT EVENTS:  T(C): 36.8 (08-05-22 @ 05:55), Max: 37 (08-04-22 @ 23:05)  HR: 98 (08-05-22 @ 05:55) (95 - 98)  BP: 150/102 (08-05-22 @ 05:55) (131/94 - 150/102)  RR: 18 (08-05-22 @ 05:55) (18 - 18)  SpO2: 94% (08-05-22 @ 05:55) (94% - 96%)  Wt(kg): --  I&O's Summary    04 Aug 2022 07:01  -  05 Aug 2022 07:00  --------------------------------------------------------  IN: 0 mL / OUT: 0 mL / NET: 0 mL    05 Aug 2022 07:01  -  05 Aug 2022 20:24  --------------------------------------------------------  IN: 360 mL / OUT: 0 mL / NET: 360 mL        PAST MEDICAL & SURGICAL HISTORY:  Congestive heart failure      Afib          SOCIAL HISTORY  Alcohol:  Tobacco:  Illicit substance use:    FAMILY HISTORY:    REVIEW OF SYSTEMS:  CONSTITUTIONAL: No fever, weight loss, or fatigue  EYES: No eye pain, visual disturbances, or discharge  ENMT:  No difficulty hearing, tinnitus, vertigo; No sinus or throat pain  NECK: No pain or stiffness  RESPIRATORY: No cough, wheezing, chills or hemoptysis; No shortness of breath  CARDIOVASCULAR: No chest pain, palpitations, dizziness, or leg swelling  GASTROINTESTINAL: No abdominal or epigastric pain. No nausea, vomiting, or hematemesis; No diarrhea or constipation. No melena or hematochezia.  GENITOURINARY: No dysuria, frequency, hematuria, or incontinence  NEUROLOGICAL: No headaches, memory loss, loss of strength, numbness, or tremors  SKIN: No itching, burning, rashes, or lesions   LYMPH NODES: No enlarged glands  ENDOCRINE: No heat or cold intolerance; No hair loss  MUSCULOSKELETAL: No joint pain or swelling; No muscle, back, or extremity pain  PSYCHIATRIC: No depression, anxiety, mood swings, or difficulty sleeping  HEME/LYMPH: No easy bruising, or bleeding gums  ALLERY AND IMMUNOLOGIC: No hives or eczema    RADIOLOGY & ADDITIONAL TESTS:    Imaging Personally Reviewed:  [ ] YES  [ ] NO    Consultant(s) Notes Reviewed:  [ ] YES  [ ] NO    PHYSICAL EXAM:  GENERAL: NAD, well-groomed, well-developed  HEAD:  Atraumatic, Normocephalic  EYES: EOMI, PERRLA, conjunctiva and sclera clear  ENMT: No tonsillar erythema, exudates, or enlargement; Moist mucous membranes, Good dentition, No lesions  NECK: Supple, No JVD, Normal thyroid  NERVOUS SYSTEM:  Alert & Oriented X3, Good concentration; Motor Strength 5/5 B/L upper and lower extremities; DTRs 2+ intact and symmetric  CHEST/LUNG: Clear to percussion bilaterally; No rales, rhonchi, wheezing, or rubs  HEART: Regular rate and rhythm; No murmurs, rubs, or gallops  ABDOMEN: Soft, Nontender, Nondistended; Bowel sounds present  EXTREMITIES:  2+ Peripheral Pulses, No clubbing, cyanosis, or edema  LYMPH: No lymphadenopathy noted  SKIN: No rashes or lesions    LABS:                        13.7   4.66  )-----------( 226      ( 05 Aug 2022 09:16 )             42.2     08-05    143  |  102  |  18  ----------------------------<  95  3.6   |  30  |  1.10    Ca    8.5      05 Aug 2022 09:16  Phos  3.4     08-04  Mg     2.0     08-04    TPro  6.5  /  Alb  3.8  /  TBili  1.4<H>  /  DBili  x   /  AST  28  /  ALT  31  /  AlkPhos  106  08-05        CAPILLARY BLOOD GLUCOSE                MEDICATIONS  (STANDING):  apixaban 5 milliGRAM(s) Oral two times a day  aspirin enteric coated 81 milliGRAM(s) Oral daily  atorvastatin 40 milliGRAM(s) Oral at bedtime  metoprolol succinate ER 50 milliGRAM(s) Oral daily  sacubitril 24 mG/valsartan 26 mG 1 Tablet(s) Oral two times a day  spironolactone 25 milliGRAM(s) Oral daily    MEDICATIONS  (PRN):      Care Discussed with Consultants/Other Providers [ ] YES  [ ] NO Patient is a 56y old  Male who presents with a chief complaint of SOB (05 Aug 2022 06:48)      INTERVAL HPI/OVERNIGHT EVENTS: feeling lot better   T(C): 36.8 (08-05-22 @ 05:55), Max: 37 (08-04-22 @ 23:05)  HR: 98 (08-05-22 @ 05:55) (95 - 98)  BP: 150/102 (08-05-22 @ 05:55) (131/94 - 150/102)  RR: 18 (08-05-22 @ 05:55) (18 - 18)  SpO2: 94% (08-05-22 @ 05:55) (94% - 96%)  Wt(kg): --  I&O's Summary    04 Aug 2022 07:01  -  05 Aug 2022 07:00  --------------------------------------------------------  IN: 0 mL / OUT: 0 mL / NET: 0 mL    05 Aug 2022 07:01  -  05 Aug 2022 20:24  --------------------------------------------------------  IN: 360 mL / OUT: 0 mL / NET: 360 mL        PAST MEDICAL & SURGICAL HISTORY:  Congestive heart failure      Afib          SOCIAL HISTORY  Alcohol:  Tobacco:  Illicit substance use:    FAMILY HISTORY:    REVIEW OF SYSTEMS:  CONSTITUTIONAL: No fever, weight loss, or fatigue  EYES: No eye pain, visual disturbances, or discharge  ENMT:  No difficulty hearing, tinnitus, vertigo; No sinus or throat pain  NECK: No pain or stiffness  RESPIRATORY: No cough, wheezing, chills or hemoptysis; No shortness of breath  CARDIOVASCULAR: No chest pain, palpitations, dizziness, or leg swelling  GASTROINTESTINAL: No abdominal or epigastric pain. No nausea, vomiting, or hematemesis; No diarrhea or constipation. No melena or hematochezia.  GENITOURINARY: No dysuria, frequency, hematuria, or incontinence  NEUROLOGICAL: No headaches, memory loss, loss of strength, numbness, or tremors  SKIN: No itching, burning, rashes, or lesions   LYMPH NODES: No enlarged glands  ENDOCRINE: No heat or cold intolerance; No hair loss  MUSCULOSKELETAL: No joint pain or swelling; No muscle, back, or extremity pain  PSYCHIATRIC: No depression, anxiety, mood swings, or difficulty sleeping  HEME/LYMPH: No easy bruising, or bleeding gums  ALLERY AND IMMUNOLOGIC: No hives or eczema    RADIOLOGY & ADDITIONAL TESTS:    Imaging Personally Reviewed:  [ ] YES  [ ] NO    Consultant(s) Notes Reviewed:  [ ] YES  [ ] NO    PHYSICAL EXAM:  GENERAL: NAD, well-groomed, well-developed  HEAD:  Atraumatic, Normocephalic  EYES: EOMI, PERRLA, conjunctiva and sclera clear  ENMT: No tonsillar erythema, exudates, or enlargement; Moist mucous membranes, Good dentition, No lesions  NECK: Supple, No JVD, Normal thyroid  NERVOUS SYSTEM:  Alert & Oriented X3, Good concentration; Motor Strength 5/5 B/L upper and lower extremities; DTRs 2+ intact and symmetric  CHEST/LUNG: Clear to percussion bilaterally; No rales, rhonchi, wheezing, or rubs  HEART: Regular rate and rhythm; No murmurs, rubs, or gallops  ABDOMEN: Soft, Nontender, Nondistended; Bowel sounds present  EXTREMITIES:  2+ Peripheral Pulses, No clubbing, cyanosis, or edema  LYMPH: No lymphadenopathy noted  SKIN: No rashes or lesions    LABS:                        13.7   4.66  )-----------( 226      ( 05 Aug 2022 09:16 )             42.2     08-05    143  |  102  |  18  ----------------------------<  95  3.6   |  30  |  1.10    Ca    8.5      05 Aug 2022 09:16  Phos  3.4     08-04  Mg     2.0     08-04    TPro  6.5  /  Alb  3.8  /  TBili  1.4<H>  /  DBili  x   /  AST  28  /  ALT  31  /  AlkPhos  106  08-05        CAPILLARY BLOOD GLUCOSE                MEDICATIONS  (STANDING):  apixaban 5 milliGRAM(s) Oral two times a day  aspirin enteric coated 81 milliGRAM(s) Oral daily  atorvastatin 40 milliGRAM(s) Oral at bedtime  metoprolol succinate ER 50 milliGRAM(s) Oral daily  sacubitril 24 mG/valsartan 26 mG 1 Tablet(s) Oral two times a day  spironolactone 25 milliGRAM(s) Oral daily    MEDICATIONS  (PRN):      Care Discussed with Consultants/Other Providers [ ] YES  [ ] NO

## 2022-08-06 LAB
ANION GAP SERPL CALC-SCNC: 12 MMOL/L — SIGNIFICANT CHANGE UP (ref 5–17)
BUN SERPL-MCNC: 18 MG/DL — SIGNIFICANT CHANGE UP (ref 7–23)
CALCIUM SERPL-MCNC: 8.6 MG/DL — SIGNIFICANT CHANGE UP (ref 8.4–10.5)
CHLORIDE SERPL-SCNC: 99 MMOL/L — SIGNIFICANT CHANGE UP (ref 96–108)
CO2 SERPL-SCNC: 31 MMOL/L — SIGNIFICANT CHANGE UP (ref 22–31)
CREAT SERPL-MCNC: 0.95 MG/DL — SIGNIFICANT CHANGE UP (ref 0.5–1.3)
EGFR: 95 ML/MIN/1.73M2 — SIGNIFICANT CHANGE UP
GLUCOSE SERPL-MCNC: 117 MG/DL — HIGH (ref 70–99)
MAGNESIUM SERPL-MCNC: 1.9 MG/DL — SIGNIFICANT CHANGE UP (ref 1.6–2.6)
PHOSPHATE SERPL-MCNC: 3.5 MG/DL — SIGNIFICANT CHANGE UP (ref 2.5–4.5)
POTASSIUM SERPL-MCNC: 3.4 MMOL/L — LOW (ref 3.5–5.3)
POTASSIUM SERPL-SCNC: 3.4 MMOL/L — LOW (ref 3.5–5.3)
SODIUM SERPL-SCNC: 142 MMOL/L — SIGNIFICANT CHANGE UP (ref 135–145)

## 2022-08-06 PROCEDURE — 99233 SBSQ HOSP IP/OBS HIGH 50: CPT | Mod: GC

## 2022-08-06 RX ORDER — POTASSIUM CHLORIDE 20 MEQ
40 PACKET (EA) ORAL ONCE
Refills: 0 | Status: COMPLETED | OUTPATIENT
Start: 2022-08-06 | End: 2022-08-06

## 2022-08-06 RX ORDER — CHLORHEXIDINE GLUCONATE 213 G/1000ML
1 SOLUTION TOPICAL DAILY
Refills: 0 | Status: DISCONTINUED | OUTPATIENT
Start: 2022-08-06 | End: 2022-08-16

## 2022-08-06 RX ADMIN — Medication 80 MILLIGRAM(S): at 06:08

## 2022-08-06 RX ADMIN — Medication 81 MILLIGRAM(S): at 11:54

## 2022-08-06 RX ADMIN — APIXABAN 5 MILLIGRAM(S): 2.5 TABLET, FILM COATED ORAL at 16:53

## 2022-08-06 RX ADMIN — SACUBITRIL AND VALSARTAN 1 TABLET(S): 24; 26 TABLET, FILM COATED ORAL at 16:53

## 2022-08-06 RX ADMIN — Medication 50 MILLIGRAM(S): at 06:08

## 2022-08-06 RX ADMIN — ATORVASTATIN CALCIUM 40 MILLIGRAM(S): 80 TABLET, FILM COATED ORAL at 22:32

## 2022-08-06 RX ADMIN — Medication 40 MILLIEQUIVALENT(S): at 11:53

## 2022-08-06 RX ADMIN — SPIRONOLACTONE 25 MILLIGRAM(S): 25 TABLET, FILM COATED ORAL at 06:08

## 2022-08-06 RX ADMIN — APIXABAN 5 MILLIGRAM(S): 2.5 TABLET, FILM COATED ORAL at 06:07

## 2022-08-06 RX ADMIN — SACUBITRIL AND VALSARTAN 1 TABLET(S): 24; 26 TABLET, FILM COATED ORAL at 06:49

## 2022-08-06 NOTE — PROGRESS NOTE ADULT - ASSESSMENT
A/P     # Acute decompensated systolic heart failure :  -pt. didn't follow with his cardio as out pt. and ran out of meds   decrease lasix to 80 mg IV q daily   cardio following   breathing much better     Ch afib :   -on eliquis   rate controlled   plan is for cardioversion on Monday     # CHF :  c/w toprol XL   d/c losartan and started on entresto   aldactone 25 mg added       HLD :  -c/w statin     Noncompliance with meds : counseling done     dispo: Cardioversion on Monday

## 2022-08-06 NOTE — PROGRESS NOTE ADULT - SUBJECTIVE AND OBJECTIVE BOX
CARDIOLOGY FELLOW PROGRESS NOTE    Subjective:    No acute events overnight.   ROS + mild dyspnea on exertion    Tele: Afib/flutter rate controlled    Current Medications:   apixaban 5 milliGRAM(s) Oral two times a day  aspirin enteric coated 81 milliGRAM(s) Oral daily  atorvastatin 40 milliGRAM(s) Oral at bedtime  furosemide   Injectable 80 milliGRAM(s) IV Push daily  metoprolol succinate ER 50 milliGRAM(s) Oral daily  sacubitril 24 mG/valsartan 26 mG 1 Tablet(s) Oral two times a day  spironolactone 25 milliGRAM(s) Oral daily      REVIEW OF SYSTEMS:  CONSTITUTIONAL: No weakness, fevers or chills  EYES/ENT: No visual changes;  No dysphagia  NECK: No pain or stiffness  RESPIRATORY: No cough, wheezing, hemoptysis; No shortness of breath  CARDIOVASCULAR: No chest pain or palpitations; No lower extremity edema  GASTROINTESTINAL: No abdominal or epigastric pain. No nausea, vomiting, or hematemesis; No diarrhea or constipation. No melena or hematochezia.  BACK: No back pain  GENITOURINARY: No dysuria, frequency or hematuria  NEUROLOGICAL: No numbness or weakness  SKIN: No itching, burning, rashes, or lesions   All other review of systems is negative unless indicated above.    Physical Exam:  T(F): 98.4 (08-05), Max: 98.4 (08-05)  HR: 99 (08-05) (99 - 99)  BP: 125/81 (08-05) (125/81 - 125/81)  BP(mean): --  ABP: --  ABP(mean): --  RR: 18 (08-06)  SpO2: 95% (08-05)  GENERAL: No acute distress, well-developed  HEAD:  Atraumatic, Normocephalic  ENT: EOMI, PERRLA, conjunctiva and sclera clear, Neck supple. +JVD.   CHEST/LUNG: Mild rales b/l lung bases  BACK: No spinal tenderness  HEART: Regular rate and rhythm; No murmurs, rubs, or gallops  ABDOMEN: Soft, Nontender, Nondistended; Bowel sounds present  EXTREMITIES:  No clubbing, cyanosis, or edema  PSYCH: Nl behavior, nl affect  NEUROLOGY: AAOx3, non-focal, cranial nerves intact  SKIN: Normal color, No rashes or lesions      Cardiovascular Diagnostic Testing: personally reviewed    CXR: Personally reviewed    Labs: Personally reviewed                        13.7   4.66  )-----------( 226      ( 05 Aug 2022 09:16 )             42.2     08-05    143  |  102  |  18  ----------------------------<  95  3.6   |  30  |  1.10    Ca    8.5      05 Aug 2022 09:16  Phos  3.4     08-04  Mg     2.0     08-04    TPro  6.5  /  Alb  3.8  /  TBili  1.4<H>  /  DBili  x   /  AST  28  /  ALT  31  /  AlkPhos  106  08-05        CARDIAC MARKERS ( 04 Aug 2022 20:39 )  21 ng/L / x     / x     / x     / x     / x      CARDIAC MARKERS ( 04 Aug 2022 17:06 )  19 ng/L / x     / x     / x     / x     / x            Serum Pro-Brain Natriuretic Peptide: 2105 pg/mL (08-04 @ 17:06)

## 2022-08-06 NOTE — PROGRESS NOTE ADULT - ASSESSMENT
57 yo man PMHx of paroxysmal Afib and NICM TTE EF 25% who was admitted for acute on chronic decompensated HF exacerbation in setting of COVID-19 infection, on IV diuretics.    #Acute on chronic decompensated HF exacerbation  - I/O not accurately recorded, please order strict I/O and obtain daily standing weight  - C/w IV lasix 80mg BID for one more day given +JVD and rales on exam  - GDMT: toprol XL 50mg daily, entresto 24/26 BID, and aldactone 25mg daily    #Afib  - Tele  - Eliquis 5mg BID for systemic embolization prevention  - C/w BB as above    Bylawrence Ruiz MD  PGY6 Cardiology

## 2022-08-06 NOTE — PROGRESS NOTE ADULT - SUBJECTIVE AND OBJECTIVE BOX
Patient is a 56y old  Male who presents with a chief complaint of SOB (06 Aug 2022 08:31)      INTERVAL HPI/OVERNIGHT EVENTS: seen and examined , feeling better   T(C): 36.7 (08-06-22 @ 12:13), Max: 36.9 (08-05-22 @ 20:29)  HR: 73 (08-06-22 @ 16:51) (73 - 99)  BP: 136/97 (08-06-22 @ 16:51) (116/70 - 136/97)  RR: 18 (08-06-22 @ 12:13) (18 - 18)  SpO2: 95% (08-06-22 @ 12:13) (95% - 95%)  Wt(kg): --  I&O's Summary    05 Aug 2022 07:01  -  06 Aug 2022 07:00  --------------------------------------------------------  IN: 780 mL / OUT: 0 mL / NET: 780 mL    06 Aug 2022 07:01  -  06 Aug 2022 20:21  --------------------------------------------------------  IN: 720 mL / OUT: 0 mL / NET: 720 mL        PAST MEDICAL & SURGICAL HISTORY:  Congestive heart failure      Afib          SOCIAL HISTORY  Alcohol:  Tobacco:  Illicit substance use:    FAMILY HISTORY:    REVIEW OF SYSTEMS:  CONSTITUTIONAL: No fever, weight loss, or fatigue  EYES: No eye pain, visual disturbances, or discharge  ENMT:  No difficulty hearing, tinnitus, vertigo; No sinus or throat pain  NECK: No pain or stiffness  RESPIRATORY: No cough, wheezing, chills or hemoptysis; No shortness of breath  CARDIOVASCULAR: No chest pain, palpitations, dizziness, or leg swelling  GASTROINTESTINAL: No abdominal or epigastric pain. No nausea, vomiting, or hematemesis; No diarrhea or constipation. No melena or hematochezia.  GENITOURINARY: No dysuria, frequency, hematuria, or incontinence  NEUROLOGICAL: No headaches, memory loss, loss of strength, numbness, or tremors  SKIN: No itching, burning, rashes, or lesions   LYMPH NODES: No enlarged glands  ENDOCRINE: No heat or cold intolerance; No hair loss  MUSCULOSKELETAL: No joint pain or swelling; No muscle, back, or extremity pain  PSYCHIATRIC: No depression, anxiety, mood swings, or difficulty sleeping  HEME/LYMPH: No easy bruising, or bleeding gums  ALLERY AND IMMUNOLOGIC: No hives or eczema    RADIOLOGY & ADDITIONAL TESTS:    Imaging Personally Reviewed:  [ ] YES  [ ] NO    Consultant(s) Notes Reviewed:  [ ] YES  [ ] NO    PHYSICAL EXAM:  GENERAL: NAD, well-groomed, well-developed  HEAD:  Atraumatic, Normocephalic  EYES: EOMI, PERRLA, conjunctiva and sclera clear  ENMT: No tonsillar erythema, exudates, or enlargement; Moist mucous membranes, Good dentition, No lesions  NECK: Supple, No JVD, Normal thyroid  NERVOUS SYSTEM:  Alert & Oriented X3, Good concentration; Motor Strength 5/5 B/L upper and lower extremities; DTRs 2+ intact and symmetric  CHEST/LUNG: Clear to percussion bilaterally; No rales, rhonchi, wheezing, or rubs  HEART: Regular rate and rhythm; No murmurs, rubs, or gallops  ABDOMEN: Soft, Nontender, Nondistended; Bowel sounds present  EXTREMITIES:  2+ Peripheral Pulses, No clubbing, cyanosis, or edema  LYMPH: No lymphadenopathy noted  SKIN: No rashes or lesions    LABS:                        13.7   4.66  )-----------( 226      ( 05 Aug 2022 09:16 )             42.2     08-06    142  |  99  |  18  ----------------------------<  117<H>  3.4<L>   |  31  |  0.95    Ca    8.6      06 Aug 2022 10:31  Phos  3.5     08-06  Mg     1.9     08-06    TPro  6.5  /  Alb  3.8  /  TBili  1.4<H>  /  DBili  x   /  AST  28  /  ALT  31  /  AlkPhos  106  08-05        CAPILLARY BLOOD GLUCOSE                MEDICATIONS  (STANDING):  apixaban 5 milliGRAM(s) Oral two times a day  aspirin enteric coated 81 milliGRAM(s) Oral daily  atorvastatin 40 milliGRAM(s) Oral at bedtime  chlorhexidine 4% Liquid 1 Application(s) Topical daily  furosemide   Injectable 80 milliGRAM(s) IV Push daily  metoprolol succinate ER 50 milliGRAM(s) Oral daily  sacubitril 24 mG/valsartan 26 mG 1 Tablet(s) Oral two times a day  spironolactone 25 milliGRAM(s) Oral daily    MEDICATIONS  (PRN):      Care Discussed with Consultants/Other Providers [ ] YES  [ ] NO

## 2022-08-06 NOTE — PROGRESS NOTE ADULT - ATTENDING COMMENTS
wayne presented with acute on chronic decompensated systolic heart failure in setting of afib with RVR. Found to be COVID positive  - currently afib/flutter is rate controlled  - on Toprol for rate control  - on AC  - will c/w diuresis to maintain negative fluid balance   - once 5 days post initial COVID positive - will attempt to perform a GENE/DCCV with the hope of improving his overall LV function

## 2022-08-07 RX ADMIN — Medication 81 MILLIGRAM(S): at 11:31

## 2022-08-07 RX ADMIN — CHLORHEXIDINE GLUCONATE 1 APPLICATION(S): 213 SOLUTION TOPICAL at 11:33

## 2022-08-07 RX ADMIN — SACUBITRIL AND VALSARTAN 1 TABLET(S): 24; 26 TABLET, FILM COATED ORAL at 05:55

## 2022-08-07 RX ADMIN — Medication 80 MILLIGRAM(S): at 05:55

## 2022-08-07 RX ADMIN — SPIRONOLACTONE 25 MILLIGRAM(S): 25 TABLET, FILM COATED ORAL at 05:55

## 2022-08-07 RX ADMIN — Medication 50 MILLIGRAM(S): at 05:55

## 2022-08-07 RX ADMIN — SACUBITRIL AND VALSARTAN 1 TABLET(S): 24; 26 TABLET, FILM COATED ORAL at 18:11

## 2022-08-07 RX ADMIN — ATORVASTATIN CALCIUM 40 MILLIGRAM(S): 80 TABLET, FILM COATED ORAL at 21:19

## 2022-08-07 RX ADMIN — APIXABAN 5 MILLIGRAM(S): 2.5 TABLET, FILM COATED ORAL at 18:11

## 2022-08-07 RX ADMIN — APIXABAN 5 MILLIGRAM(S): 2.5 TABLET, FILM COATED ORAL at 05:55

## 2022-08-07 NOTE — PROGRESS NOTE ADULT - SUBJECTIVE AND OBJECTIVE BOX
Patient is a 56y old  Male who presents with a chief complaint of SOB (06 Aug 2022 18:20)      INTERVAL HPI/OVERNIGHT EVENTS: feeling better   T(C): 36.9 (08-07-22 @ 13:43), Max: 36.9 (08-07-22 @ 13:43)  HR: 91 (08-07-22 @ 13:43) (71 - 91)  BP: 121/77 (08-07-22 @ 13:43) (121/77 - 126/82)  RR: 18 (08-07-22 @ 13:43) (18 - 18)  SpO2: 95% (08-07-22 @ 13:43) (95% - 98%)  Wt(kg): --  I&O's Summary    06 Aug 2022 07:01  -  07 Aug 2022 07:00  --------------------------------------------------------  IN: 720 mL / OUT: 0 mL / NET: 720 mL    07 Aug 2022 07:01  -  07 Aug 2022 19:06  --------------------------------------------------------  IN: 600 mL / OUT: 0 mL / NET: 600 mL        PAST MEDICAL & SURGICAL HISTORY:  Congestive heart failure      Afib          SOCIAL HISTORY  Alcohol:  Tobacco:  Illicit substance use:    FAMILY HISTORY:    REVIEW OF SYSTEMS:  CONSTITUTIONAL: No fever, weight loss, or fatigue  EYES: No eye pain, visual disturbances, or discharge  ENMT:  No difficulty hearing, tinnitus, vertigo; No sinus or throat pain  NECK: No pain or stiffness  RESPIRATORY: No cough, wheezing, chills or hemoptysis; No shortness of breath  CARDIOVASCULAR: No chest pain, palpitations, dizziness, or leg swelling  GASTROINTESTINAL: No abdominal or epigastric pain. No nausea, vomiting, or hematemesis; No diarrhea or constipation. No melena or hematochezia.  GENITOURINARY: No dysuria, frequency, hematuria, or incontinence  NEUROLOGICAL: No headaches, memory loss, loss of strength, numbness, or tremors  SKIN: No itching, burning, rashes, or lesions   LYMPH NODES: No enlarged glands  ENDOCRINE: No heat or cold intolerance; No hair loss  MUSCULOSKELETAL: No joint pain or swelling; No muscle, back, or extremity pain  PSYCHIATRIC: No depression, anxiety, mood swings, or difficulty sleeping  HEME/LYMPH: No easy bruising, or bleeding gums  ALLERY AND IMMUNOLOGIC: No hives or eczema    RADIOLOGY & ADDITIONAL TESTS:    Imaging Personally Reviewed:  [ ] YES  [ ] NO    Consultant(s) Notes Reviewed:  [ ] YES  [ ] NO    PHYSICAL EXAM:  GENERAL: NAD, well-groomed, well-developed  HEAD:  Atraumatic, Normocephalic  EYES: EOMI, PERRLA, conjunctiva and sclera clear  ENMT: No tonsillar erythema, exudates, or enlargement; Moist mucous membranes, Good dentition, No lesions  NECK: Supple, No JVD, Normal thyroid  NERVOUS SYSTEM:  Alert & Oriented X3, Good concentration; Motor Strength 5/5 B/L upper and lower extremities; DTRs 2+ intact and symmetric  CHEST/LUNG: Clear to percussion bilaterally; No rales, rhonchi, wheezing, or rubs  HEART: Regular rate and rhythm; No murmurs, rubs, or gallops  ABDOMEN: Soft, Nontender, Nondistended; Bowel sounds present  EXTREMITIES:  2+ Peripheral Pulses, No clubbing, cyanosis, or edema  LYMPH: No lymphadenopathy noted  SKIN: No rashes or lesions    LABS:    08-06    142  |  99  |  18  ----------------------------<  117<H>  3.4<L>   |  31  |  0.95    Ca    8.6      06 Aug 2022 10:31  Phos  3.5     08-06  Mg     1.9     08-06          CAPILLARY BLOOD GLUCOSE                MEDICATIONS  (STANDING):  apixaban 5 milliGRAM(s) Oral two times a day  aspirin enteric coated 81 milliGRAM(s) Oral daily  atorvastatin 40 milliGRAM(s) Oral at bedtime  chlorhexidine 4% Liquid 1 Application(s) Topical daily  furosemide   Injectable 80 milliGRAM(s) IV Push daily  metoprolol succinate ER 50 milliGRAM(s) Oral daily  sacubitril 24 mG/valsartan 26 mG 1 Tablet(s) Oral two times a day  spironolactone 25 milliGRAM(s) Oral daily    MEDICATIONS  (PRN):      Care Discussed with Consultants/Other Providers [ ] YES  [ ] NO

## 2022-08-08 ENCOUNTER — TRANSCRIPTION ENCOUNTER (OUTPATIENT)
Age: 57
End: 2022-08-08

## 2022-08-08 LAB
ANION GAP SERPL CALC-SCNC: 10 MMOL/L — SIGNIFICANT CHANGE UP (ref 5–17)
BUN SERPL-MCNC: 21 MG/DL — SIGNIFICANT CHANGE UP (ref 7–23)
CALCIUM SERPL-MCNC: 8.5 MG/DL — SIGNIFICANT CHANGE UP (ref 8.4–10.5)
CHLORIDE SERPL-SCNC: 102 MMOL/L — SIGNIFICANT CHANGE UP (ref 96–108)
CO2 SERPL-SCNC: 27 MMOL/L — SIGNIFICANT CHANGE UP (ref 22–31)
CREAT SERPL-MCNC: 0.89 MG/DL — SIGNIFICANT CHANGE UP (ref 0.5–1.3)
EGFR: 101 ML/MIN/1.73M2 — SIGNIFICANT CHANGE UP
GLUCOSE SERPL-MCNC: 108 MG/DL — HIGH (ref 70–99)
HCT VFR BLD CALC: 46.9 % — SIGNIFICANT CHANGE UP (ref 39–50)
HGB BLD-MCNC: 15.4 G/DL — SIGNIFICANT CHANGE UP (ref 13–17)
MCHC RBC-ENTMCNC: 29.8 PG — SIGNIFICANT CHANGE UP (ref 27–34)
MCHC RBC-ENTMCNC: 32.8 GM/DL — SIGNIFICANT CHANGE UP (ref 32–36)
MCV RBC AUTO: 90.7 FL — SIGNIFICANT CHANGE UP (ref 80–100)
NRBC # BLD: 0 /100 WBCS — SIGNIFICANT CHANGE UP (ref 0–0)
PLATELET # BLD AUTO: 271 K/UL — SIGNIFICANT CHANGE UP (ref 150–400)
POTASSIUM SERPL-MCNC: 3.9 MMOL/L — SIGNIFICANT CHANGE UP (ref 3.5–5.3)
POTASSIUM SERPL-SCNC: 3.9 MMOL/L — SIGNIFICANT CHANGE UP (ref 3.5–5.3)
RBC # BLD: 5.17 M/UL — SIGNIFICANT CHANGE UP (ref 4.2–5.8)
RBC # FLD: 13.3 % — SIGNIFICANT CHANGE UP (ref 10.3–14.5)
SODIUM SERPL-SCNC: 139 MMOL/L — SIGNIFICANT CHANGE UP (ref 135–145)
WBC # BLD: 6.9 K/UL — SIGNIFICANT CHANGE UP (ref 3.8–10.5)
WBC # FLD AUTO: 6.9 K/UL — SIGNIFICANT CHANGE UP (ref 3.8–10.5)

## 2022-08-08 PROCEDURE — 99233 SBSQ HOSP IP/OBS HIGH 50: CPT | Mod: GC

## 2022-08-08 RX ORDER — FUROSEMIDE 40 MG
80 TABLET ORAL DAILY
Refills: 0 | Status: DISCONTINUED | OUTPATIENT
Start: 2022-08-09 | End: 2022-08-16

## 2022-08-08 RX ORDER — SACUBITRIL AND VALSARTAN 24; 26 MG/1; MG/1
1 TABLET, FILM COATED ORAL
Refills: 0 | Status: DISCONTINUED | OUTPATIENT
Start: 2022-08-08 | End: 2022-08-16

## 2022-08-08 RX ADMIN — SPIRONOLACTONE 25 MILLIGRAM(S): 25 TABLET, FILM COATED ORAL at 05:44

## 2022-08-08 RX ADMIN — APIXABAN 5 MILLIGRAM(S): 2.5 TABLET, FILM COATED ORAL at 05:44

## 2022-08-08 RX ADMIN — SACUBITRIL AND VALSARTAN 1 TABLET(S): 24; 26 TABLET, FILM COATED ORAL at 18:12

## 2022-08-08 RX ADMIN — Medication 81 MILLIGRAM(S): at 12:06

## 2022-08-08 RX ADMIN — APIXABAN 5 MILLIGRAM(S): 2.5 TABLET, FILM COATED ORAL at 18:11

## 2022-08-08 RX ADMIN — Medication 50 MILLIGRAM(S): at 05:44

## 2022-08-08 RX ADMIN — ATORVASTATIN CALCIUM 40 MILLIGRAM(S): 80 TABLET, FILM COATED ORAL at 22:47

## 2022-08-08 RX ADMIN — Medication 80 MILLIGRAM(S): at 05:45

## 2022-08-08 RX ADMIN — SACUBITRIL AND VALSARTAN 1 TABLET(S): 24; 26 TABLET, FILM COATED ORAL at 05:44

## 2022-08-08 NOTE — PROGRESS NOTE ADULT - SUBJECTIVE AND OBJECTIVE BOX
Patient is a 56y old  Male who presents with a chief complaint of SOB (08 Aug 2022 06:17)      INTERVAL HPI/OVERNIGHT EVENTS:  T(C): 37.2 (08-08-22 @ 21:55), Max: 37.2 (08-08-22 @ 21:55)  HR: 96 (08-08-22 @ 21:55) (89 - 97)  BP: 157/97 (08-08-22 @ 21:55) (134/89 - 157/97)  RR: 17 (08-08-22 @ 21:55) (17 - 18)  SpO2: 98% (08-08-22 @ 21:55) (95% - 98%)  Wt(kg): --  I&O's Summary    07 Aug 2022 07:01  -  08 Aug 2022 07:00  --------------------------------------------------------  IN: 1080 mL / OUT: 0 mL / NET: 1080 mL    08 Aug 2022 07:01  -  08 Aug 2022 23:17  --------------------------------------------------------  IN: 720 mL / OUT: 0 mL / NET: 720 mL        PAST MEDICAL & SURGICAL HISTORY:  Congestive heart failure      Afib          SOCIAL HISTORY  Alcohol:  Tobacco:  Illicit substance use:    FAMILY HISTORY:    REVIEW OF SYSTEMS:  CONSTITUTIONAL: No fever, weight loss, or fatigue  EYES: No eye pain, visual disturbances, or discharge  ENMT:  No difficulty hearing, tinnitus, vertigo; No sinus or throat pain  NECK: No pain or stiffness  RESPIRATORY: No cough, wheezing, chills or hemoptysis; No shortness of breath  CARDIOVASCULAR: No chest pain, palpitations, dizziness, or leg swelling  GASTROINTESTINAL: No abdominal or epigastric pain. No nausea, vomiting, or hematemesis; No diarrhea or constipation. No melena or hematochezia.  GENITOURINARY: No dysuria, frequency, hematuria, or incontinence  NEUROLOGICAL: No headaches, memory loss, loss of strength, numbness, or tremors  SKIN: No itching, burning, rashes, or lesions   LYMPH NODES: No enlarged glands  ENDOCRINE: No heat or cold intolerance; No hair loss  MUSCULOSKELETAL: No joint pain or swelling; No muscle, back, or extremity pain  PSYCHIATRIC: No depression, anxiety, mood swings, or difficulty sleeping  HEME/LYMPH: No easy bruising, or bleeding gums  ALLERY AND IMMUNOLOGIC: No hives or eczema    RADIOLOGY & ADDITIONAL TESTS:    Imaging Personally Reviewed:  [ ] YES  [ ] NO    Consultant(s) Notes Reviewed:  [ ] YES  [ ] NO    PHYSICAL EXAM:  GENERAL: NAD, well-groomed, well-developed  HEAD:  Atraumatic, Normocephalic  EYES: EOMI, PERRLA, conjunctiva and sclera clear  ENMT: No tonsillar erythema, exudates, or enlargement; Moist mucous membranes, Good dentition, No lesions  NECK: Supple, No JVD, Normal thyroid  NERVOUS SYSTEM:  Alert & Oriented X3, Good concentration; Motor Strength 5/5 B/L upper and lower extremities; DTRs 2+ intact and symmetric  CHEST/LUNG: Clear to percussion bilaterally; No rales, rhonchi, wheezing, or rubs  HEART: Regular rate and rhythm; No murmurs, rubs, or gallops  ABDOMEN: Soft, Nontender, Nondistended; Bowel sounds present  EXTREMITIES:  2+ Peripheral Pulses, No clubbing, cyanosis, or edema  LYMPH: No lymphadenopathy noted  SKIN: No rashes or lesions    LABS:                        15.4   6.90  )-----------( 271      ( 08 Aug 2022 06:53 )             46.9     08-08    139  |  102  |  21  ----------------------------<  108<H>  3.9   |  27  |  0.89    Ca    8.5      08 Aug 2022 06:51          CAPILLARY BLOOD GLUCOSE                MEDICATIONS  (STANDING):  apixaban 5 milliGRAM(s) Oral two times a day  aspirin enteric coated 81 milliGRAM(s) Oral daily  atorvastatin 40 milliGRAM(s) Oral at bedtime  chlorhexidine 4% Liquid 1 Application(s) Topical daily  metoprolol succinate ER 50 milliGRAM(s) Oral daily  sacubitril 49 mG/valsartan 51 mG 1 Tablet(s) Oral two times a day  spironolactone 25 milliGRAM(s) Oral daily    MEDICATIONS  (PRN):      Care Discussed with Consultants/Other Providers [ ] YES  [ ] NO Patient is a 56y old  Male who presents with a chief complaint of SOB (08 Aug 2022 06:17)      INTERVAL HPI/OVERNIGHT EVENTS: doing well , denies any SOB   T(C): 37.2 (08-08-22 @ 21:55), Max: 37.2 (08-08-22 @ 21:55)  HR: 96 (08-08-22 @ 21:55) (89 - 97)  BP: 157/97 (08-08-22 @ 21:55) (134/89 - 157/97)  RR: 17 (08-08-22 @ 21:55) (17 - 18)  SpO2: 98% (08-08-22 @ 21:55) (95% - 98%)  Wt(kg): --  I&O's Summary    07 Aug 2022 07:01  -  08 Aug 2022 07:00  --------------------------------------------------------  IN: 1080 mL / OUT: 0 mL / NET: 1080 mL    08 Aug 2022 07:01  -  08 Aug 2022 23:17  --------------------------------------------------------  IN: 720 mL / OUT: 0 mL / NET: 720 mL        PAST MEDICAL & SURGICAL HISTORY:  Congestive heart failure      Afib          SOCIAL HISTORY  Alcohol:  Tobacco:  Illicit substance use:    FAMILY HISTORY:    REVIEW OF SYSTEMS:  CONSTITUTIONAL: No fever, weight loss, or fatigue  EYES: No eye pain, visual disturbances, or discharge  ENMT:  No difficulty hearing, tinnitus, vertigo; No sinus or throat pain  NECK: No pain or stiffness  RESPIRATORY: No cough, wheezing, chills or hemoptysis; No shortness of breath  CARDIOVASCULAR: No chest pain, palpitations, dizziness, or leg swelling  GASTROINTESTINAL: No abdominal or epigastric pain. No nausea, vomiting, or hematemesis; No diarrhea or constipation. No melena or hematochezia.  GENITOURINARY: No dysuria, frequency, hematuria, or incontinence  NEUROLOGICAL: No headaches, memory loss, loss of strength, numbness, or tremors  SKIN: No itching, burning, rashes, or lesions   LYMPH NODES: No enlarged glands  ENDOCRINE: No heat or cold intolerance; No hair loss  MUSCULOSKELETAL: No joint pain or swelling; No muscle, back, or extremity pain  PSYCHIATRIC: No depression, anxiety, mood swings, or difficulty sleeping  HEME/LYMPH: No easy bruising, or bleeding gums  ALLERY AND IMMUNOLOGIC: No hives or eczema    RADIOLOGY & ADDITIONAL TESTS:    Imaging Personally Reviewed:  [ ] YES  [ ] NO    Consultant(s) Notes Reviewed:  [ ] YES  [ ] NO    PHYSICAL EXAM:  GENERAL: NAD, well-groomed, well-developed  HEAD:  Atraumatic, Normocephalic  EYES: EOMI, PERRLA, conjunctiva and sclera clear  ENMT: No tonsillar erythema, exudates, or enlargement; Moist mucous membranes, Good dentition, No lesions  NECK: Supple, No JVD, Normal thyroid  NERVOUS SYSTEM:  Alert & Oriented X3, Good concentration; Motor Strength 5/5 B/L upper and lower extremities; DTRs 2+ intact and symmetric  CHEST/LUNG: Clear to percussion bilaterally; No rales, rhonchi, wheezing, or rubs  HEART: Regular rate and rhythm; No murmurs, rubs, or gallops  ABDOMEN: Soft, Nontender, Nondistended; Bowel sounds present  EXTREMITIES:  2+ Peripheral Pulses, No clubbing, cyanosis, or edema  LYMPH: No lymphadenopathy noted  SKIN: No rashes or lesions    LABS:                        15.4   6.90  )-----------( 271      ( 08 Aug 2022 06:53 )             46.9     08-08    139  |  102  |  21  ----------------------------<  108<H>  3.9   |  27  |  0.89    Ca    8.5      08 Aug 2022 06:51          CAPILLARY BLOOD GLUCOSE                MEDICATIONS  (STANDING):  apixaban 5 milliGRAM(s) Oral two times a day  aspirin enteric coated 81 milliGRAM(s) Oral daily  atorvastatin 40 milliGRAM(s) Oral at bedtime  chlorhexidine 4% Liquid 1 Application(s) Topical daily  metoprolol succinate ER 50 milliGRAM(s) Oral daily  sacubitril 49 mG/valsartan 51 mG 1 Tablet(s) Oral two times a day  spironolactone 25 milliGRAM(s) Oral daily    MEDICATIONS  (PRN):      Care Discussed with Consultants/Other Providers [ ] YES  [ ] NO

## 2022-08-08 NOTE — PROGRESS NOTE ADULT - ASSESSMENT
A/P     # Acute decompensated systolic heart failure :  -pt. didn't follow with his cardio as out pt. and ran out of meds   decrease lasix to 80 mg IV q daily change to PO   cardio following   breathing much better     Ch afib :   -on eliquis   rate controlled   plan is for cardioversion     # CHF :  c/w toprol XL   d/c losartan and started on entresto   aldactone 25 mg added       HLD :  -c/w statin     Noncompliance with meds : counseling done     dispo: Cardioversion was not done 2/2 covid-19 pos status , will be done when off isolation.   if isolation is 10 days before procedure can be done , then plan for d/c home and can be done as out pt ?

## 2022-08-08 NOTE — PROGRESS NOTE ADULT - SUBJECTIVE AND OBJECTIVE BOX
CARDIOLOGY CONSULT PROGRESS NOTE  MARGARITA WINCHESTER  MRN-[id number]    INTERVAL EVENTS:  - tele:   -     ROS:  Negative, except as above.    MEDICATIONS  (STANDING):  apixaban 5 milliGRAM(s) Oral two times a day  aspirin enteric coated 81 milliGRAM(s) Oral daily  atorvastatin 40 milliGRAM(s) Oral at bedtime  chlorhexidine 4% Liquid 1 Application(s) Topical daily  furosemide   Injectable 80 milliGRAM(s) IV Push daily  metoprolol succinate ER 50 milliGRAM(s) Oral daily  sacubitril 24 mG/valsartan 26 mG 1 Tablet(s) Oral two times a day  spironolactone 25 milliGRAM(s) Oral daily    Allergies  No Known Allergies    P/E:  Vital Signs Last 24 Hrs  T(C): 36.7 (08 Aug 2022 05:40), Max: 37.1 (07 Aug 2022 20:50)  T(F): 98.1 (08 Aug 2022 05:40), Max: 98.7 (07 Aug 2022 20:50)  HR: 89 (08 Aug 2022 05:40) (87 - 91)  BP: 134/89 (08 Aug 2022 05:40) (121/77 - 135/71)  RR: 18 (08 Aug 2022 05:40) (18 - 18)  SpO2: 95% (08 Aug 2022 05:40) (95% - 96%)    I&O's Summary  06 Aug 2022 07:01  -  07 Aug 2022 07:00  --------------------------------------------------------  IN: 720 mL / OUT: 0 mL / NET: 720 mL    07 Aug 2022 07:01  -  08 Aug 2022 06:18  --------------------------------------------------------  IN: 1080 mL / OUT: 0 mL / NET: 1080 mL    - gen: well appearing, laying in hospital bed, NAD  - HEENT: MMM, NCAT  - neck: no JVD, supple  - heart: RRR, nml S1/S2, no RMG  - lungs: CTABL, nml WOB  - abd: soft, NTND, NABS  - ext: WWP, PPP, no BERNARDO    RELEVANT RECENT LABS/IMAGING/STUDIES:    08-06    142  |  99  |  18  ----------------------------<  117<H>  3.4<L>   |  31  |  0.95    Ca    8.6      06 Aug 2022 10:31  Phos  3.5     08-06  Mg     1.9     08-06 CARDIOLOGY CONSULT PROGRESS NOTE  MARGARITA WINCHESTER  MRN-[id number]    INTERVAL EVENTS:  - tele: afib/aflutter in the 70s to 90s  - States that he is feeling a lot better. Denies any SOB and is able to lay flay and walk with no difficulties.     ROS:  Negative, except as above.    MEDICATIONS  (STANDING):  apixaban 5 milliGRAM(s) Oral two times a day  aspirin enteric coated 81 milliGRAM(s) Oral daily  atorvastatin 40 milliGRAM(s) Oral at bedtime  chlorhexidine 4% Liquid 1 Application(s) Topical daily  furosemide   Injectable 80 milliGRAM(s) IV Push daily  metoprolol succinate ER 50 milliGRAM(s) Oral daily  sacubitril 24 mG/valsartan 26 mG 1 Tablet(s) Oral two times a day  spironolactone 25 milliGRAM(s) Oral daily    Allergies  No Known Allergies    P/E:  Vital Signs Last 24 Hrs  T(C): 36.7 (08 Aug 2022 05:40), Max: 37.1 (07 Aug 2022 20:50)  T(F): 98.1 (08 Aug 2022 05:40), Max: 98.7 (07 Aug 2022 20:50)  HR: 89 (08 Aug 2022 05:40) (87 - 91)  BP: 134/89 (08 Aug 2022 05:40) (121/77 - 135/71)  RR: 18 (08 Aug 2022 05:40) (18 - 18)  SpO2: 95% (08 Aug 2022 05:40) (95% - 96%)    I&O's Summary  06 Aug 2022 07:01  -  07 Aug 2022 07:00  --------------------------------------------------------  IN: 720 mL / OUT: 0 mL / NET: 720 mL    07 Aug 2022 07:01  -  08 Aug 2022 06:18  --------------------------------------------------------  IN: 1080 mL / OUT: 0 mL / NET: 1080 mL    - gen: well appearing, laying in hospital bed, NAD  - HEENT: MMM, NCAT  - neck: no JVD, supple  - heart: RRR, nml S1/S2, no RMG  - lungs: Mild rhonchi in left lower lobe   - abd: soft, NTND, NABS  - ext: WWP, PPP, no BERNARDO    RELEVANT RECENT LABS/IMAGING/STUDIES:    08-06    142  |  99  |  18  ----------------------------<  117<H>  3.4<L>   |  31  |  0.95    Ca    8.6      06 Aug 2022 10:31  Phos  3.5     08-06  Mg     1.9     08-06 CARDIOLOGY CONSULT PROGRESS NOTE  MARGARITA WINCHESTER  MRN-[id number]      INTERVAL EVENTS:  - tele: afib/aflutter in the 70s to 90s  - States that he is feeling a lot better. Denies any SOB and is able to lay flay and walk with no difficulties.     ROS:  Negative, except as above.    MEDICATIONS  (STANDING):  apixaban 5 milliGRAM(s) Oral two times a day  aspirin enteric coated 81 milliGRAM(s) Oral daily  atorvastatin 40 milliGRAM(s) Oral at bedtime  chlorhexidine 4% Liquid 1 Application(s) Topical daily  furosemide   Injectable 80 milliGRAM(s) IV Push daily  metoprolol succinate ER 50 milliGRAM(s) Oral daily  sacubitril 24 mG/valsartan 26 mG 1 Tablet(s) Oral two times a day  spironolactone 25 milliGRAM(s) Oral daily    Allergies  No Known Allergies    P/E:  Vital Signs Last 24 Hrs  T(C): 36.7 (08 Aug 2022 05:40), Max: 37.1 (07 Aug 2022 20:50)  T(F): 98.1 (08 Aug 2022 05:40), Max: 98.7 (07 Aug 2022 20:50)  HR: 89 (08 Aug 2022 05:40) (87 - 91)  BP: 134/89 (08 Aug 2022 05:40) (121/77 - 135/71)  RR: 18 (08 Aug 2022 05:40) (18 - 18)  SpO2: 95% (08 Aug 2022 05:40) (95% - 96%)    - gen: well appearing, laying in hospital bed, NAD  - HEENT: MMM, NCAT  - neck: no JVD, supple  - heart: RRR, nml S1/S2, no RMG  - lungs: Mild rhonchi in left lower lobe   - abd: soft, NTND, NABS  - ext: WWP, PPP, no BERNARDO      RECENT LABS:    08-06  142  |  99  |  18  ----------------------------<  117<H>  3.4<L>   |  31  |  0.95    Ca    8.6      06 Aug 2022 10:31  Phos  3.5     08-06  Mg     1.9     08-06

## 2022-08-08 NOTE — PROGRESS NOTE ADULT - ASSESSMENT
57 yo man PMHx of paroxysmal Afib and NICM TTE EF 25% who was admitted for acute on chronic decompensated HF exacerbation in setting of COVID-19 infection, on IV diuretics.    *** INCOMPLETE NOTE *** INCOMPLETE NOTE *** INCOMPLETE NOTE *** INCOMPLETE NOTE *** INCOMPLETE NOTE *** INCOMPLETE NOTE *** INCOMPLETE NOTE *** INCOMPLETE NOTE *** INCOMPLETE NOTE *** INCOMPLETE NOTE *** INCOMPLETE NOTE *** INCOMPLETE NOTE *** INCOMPLETE NOTE *** INCOMPLETE NOTE *** INCOMPLETE NOTE *** INCOMPLETE NOTE *** INCOMPLETE NOTE *** INCOMPLETE NOTE *** INCOMPLETE NOTE *** INCOMPLETE NOTE ***  apix 5 BID, asa 81, atorva 40, furo 80 IV, succ 50, sac/obie 24/26, rajwinder 25    #Acute on chronic decompensated HF exacerbation  - I/O not accurately recorded, please order strict I/O and obtain daily standing weight  - C/w IV lasix 80mg BID for one more day given +JVD and rales on exam  - GDMT: toprol XL 50mg daily, entresto 24/26 BID, and aldactone 25mg daily    #Afib  - Tele  - Eliquis 5mg BID for systemic embolization prevention  - C/w BB as above    wayne presented with acute on chronic decompensated systolic heart failure in setting of afib with RVR. Found to be COVID positive  - currently afib/flutter is rate controlled  - on Toprol for rate control  - on AC  - will c/w diuresis to maintain negative fluid balance   - once 5 days post initial COVID positive - will attempt to perform a GENE/DCCV with the hope of improving his overall LV function .  57 yo man PMHx of paroxysmal Afib and NICM TTE EF 25% who was admitted for acute on chronic decompensated HF exacerbation in setting of COVID-19 infection, on IV diuretics.    *** INCOMPLETE NOTE *** INCOMPLETE NOTE *** INCOMPLETE NOTE *** INCOMPLETE NOTE *** INCOMPLETE NOTE *** INCOMPLETE NOTE *** INCOMPLETE NOTE *** INCOMPLETE NOTE *** INCOMPLETE NOTE *** INCOMPLETE NOTE *** INCOMPLETE NOTE *** INCOMPLETE NOTE *** INCOMPLETE NOTE *** INCOMPLETE NOTE *** INCOMPLETE NOTE *** INCOMPLETE NOTE *** INCOMPLETE NOTE *** INCOMPLETE NOTE *** INCOMPLETE NOTE *** INCOMPLETE NOTE ***  apix 5 BID, asa 81, atorva 40, furo 80 IV, succ 50, sac/obie 24/26, rajwinder 25    #Acute on chronic decompensated HF exacerbation  - I/O not accurately recorded, please order strict I/O and obtain daily standing weight  - C/w IV lasix 80mg daily   - GDMT: toprol XL 50mg daily, entresto 24/26 BID, and aldactone 25mg daily  - Plan for DCCV tomorrow. Please get GENE today evening    #Afib  - Tele  - Eliquis 5mg BID for systemic embolization prevention  - C/w BB as above     55 yo man PMHx of paroxysmal Afib and NICM TTE EF 25% who was admitted for acute on chronic decompensated HF exacerbation in setting of COVID-19 infection, on IV diuretics.    *** INCOMPLETE NOTE *** INCOMPLETE NOTE *** INCOMPLETE NOTE *** INCOMPLETE NOTE *** INCOMPLETE NOTE *** INCOMPLETE NOTE *** INCOMPLETE NOTE *** INCOMPLETE NOTE *** INCOMPLETE NOTE *** INCOMPLETE NOTE *** INCOMPLETE NOTE *** INCOMPLETE NOTE *** INCOMPLETE NOTE *** INCOMPLETE NOTE *** INCOMPLETE NOTE *** INCOMPLETE NOTE *** INCOMPLETE NOTE *** INCOMPLETE NOTE *** INCOMPLETE NOTE *** INCOMPLETE NOTE ***  apix 5 BID, asa 81, atorva 40, furo 80 IV, succ 50, sac/obie 24/26, rajwinder 25    #Acute on chronic decompensated HF exacerbation  - I/O not accurately recorded, please order strict I/O and obtain daily standing weight  - C/w IV lasix 80mg daily   - GDMT: toprol XL 50mg daily, entresto 24/26 BID, and aldactone 25mg daily  - Plan for GENE/DCCV tomorrow  #Afib  - Tele  - Eliquis 5mg BID for systemic embolization prevention  - C/w BB as above     55 yo man PMHx of paroxysmal Afib and NICM TTE EF 25% who was admitted for acute on chronic decompensated HF exacerbation in setting of COVID-19 infection, on IV diuretics.    #Acute on chronic decompensated HF exacerbation  - I/O not accurately recorded, please order strict I/O and obtain daily standing weight  - C/w IV lasix 80mg daily   - GDMT: toprol XL 50mg daily, entresto 24/26 BID, and aldactone 25mg daily    #Afib  - Tele shows afib/aflutter in the 70s to 90s   - Eliquis 5mg BID for systemic embolization prevention  - C/w BB as above  - Plan for GENE/DCCV tomorrow       57 yo man PMHx of paroxysmal Afib and NICM TTE EF 25% who was admitted for acute on chronic decompensated HF exacerbation in setting of COVID-19 infection, on IV diuretics.    #Acute on chronic decompensated HF exacerbation  - I/O not accurately recorded, please order strict I/O and obtain daily standing weight  - Would recommend switching to PO lasix 80mg and increasing entresto to 49/51 starting tomorrow   - GDMT: toprol XL 50mg daily, entresto 24/26 BID, and aldactone 25mg daily    #Afib  - Tele shows afib/aflutter in the 70s to 90s   - Eliquis 5mg BID for systemic embolization prevention  - C/w BB as above  - Plan for GENE/DCCV when patient off isolation from COVID (dependent on infection control)       57 yo man PMHx of paroxysmal Afib and NICM TTE EF 25% who was admitted for acute on chronic decompensated HF exacerbation in setting of COVID-19 infection, on IV diuretics.    #Acute on chronic decompensated HF exacerbation  - I/O not accurately recorded, please order strict I/O and obtain daily standing weight  - Currently on GDMT: toprol XL 50mg daily, entresto 24/26 BID, and aldactone 25mg daily  - Would recommend switching to PO lasix 80mg and increasing entresto to 49/51 starting tomorrow     #Afib  - Tele shows afib/aflutter in the 70s to 90s   - Eliquis 5mg BID for systemic embolization prevention  - C/w BB as above  - Plan for GENE/DCCV when patient off isolation from COVID (dependent on infection control)       55 yo man PMHx of paroxysmal Afib and NICM TTE EF 25%, who was admitted for acute on chronic decompensated HF exacerbation in setting of COVID-19 infection, on IV diuretics.      REC:  #1. Acute on chronic decompensated HF exacerbation  - I/O not accurately recorded, please order strict I/O and obtain daily standing weight  - Currently on GDMT: Toprol XL 50mg daily, Entresto 24/26 BID, and Aldactone 25mg daily  - Would recommend switching to PO Lasix 80mg and increasing Entresto to 49/51 starting tomorrow     #2.  Afib  - Tele shows Afib/Aflutter in the 70s to 90s   - Eliquis 5mg BID for systemic embolization prevention  - C/W BB as above  - Plan for GENE/DCCV when patient off isolation from COVID (dependent on infection control)      Haydee Villalta M.D.  Cardiology resident      Plan discussed with cardiology fellow and resident .  Patient seen and examined.  Hx., exam and labs as above.  I agree with the assessment and recommendations, which I have reviewed and edited where appropriate.  John Del Rosario M.D.  Cardiology Attending, Consult Service  For Cardiology consults and questions, all Cardiology service information can be found 24/7 on [email or web address] - use password: [email or web address] to log in.

## 2022-08-09 ENCOUNTER — TRANSCRIPTION ENCOUNTER (OUTPATIENT)
Age: 57
End: 2022-08-09

## 2022-08-09 LAB
ANION GAP SERPL CALC-SCNC: 12 MMOL/L — SIGNIFICANT CHANGE UP (ref 5–17)
BUN SERPL-MCNC: 19 MG/DL — SIGNIFICANT CHANGE UP (ref 7–23)
CALCIUM SERPL-MCNC: 9.1 MG/DL — SIGNIFICANT CHANGE UP (ref 8.4–10.5)
CHLORIDE SERPL-SCNC: 102 MMOL/L — SIGNIFICANT CHANGE UP (ref 96–108)
CO2 SERPL-SCNC: 30 MMOL/L — SIGNIFICANT CHANGE UP (ref 22–31)
CREAT SERPL-MCNC: 0.93 MG/DL — SIGNIFICANT CHANGE UP (ref 0.5–1.3)
EGFR: 97 ML/MIN/1.73M2 — SIGNIFICANT CHANGE UP
GLUCOSE SERPL-MCNC: 106 MG/DL — HIGH (ref 70–99)
POTASSIUM SERPL-MCNC: 4.2 MMOL/L — SIGNIFICANT CHANGE UP (ref 3.5–5.3)
POTASSIUM SERPL-SCNC: 4.2 MMOL/L — SIGNIFICANT CHANGE UP (ref 3.5–5.3)
SODIUM SERPL-SCNC: 144 MMOL/L — SIGNIFICANT CHANGE UP (ref 135–145)

## 2022-08-09 PROCEDURE — 99232 SBSQ HOSP IP/OBS MODERATE 35: CPT | Mod: GC

## 2022-08-09 RX ORDER — SACUBITRIL AND VALSARTAN 24; 26 MG/1; MG/1
1 TABLET, FILM COATED ORAL
Qty: 60 | Refills: 0
Start: 2022-08-09 | End: 2022-09-07

## 2022-08-09 RX ADMIN — SACUBITRIL AND VALSARTAN 1 TABLET(S): 24; 26 TABLET, FILM COATED ORAL at 18:14

## 2022-08-09 RX ADMIN — Medication 80 MILLIGRAM(S): at 06:45

## 2022-08-09 RX ADMIN — SPIRONOLACTONE 25 MILLIGRAM(S): 25 TABLET, FILM COATED ORAL at 06:45

## 2022-08-09 RX ADMIN — SACUBITRIL AND VALSARTAN 1 TABLET(S): 24; 26 TABLET, FILM COATED ORAL at 06:54

## 2022-08-09 RX ADMIN — Medication 50 MILLIGRAM(S): at 06:45

## 2022-08-09 RX ADMIN — Medication 81 MILLIGRAM(S): at 12:26

## 2022-08-09 RX ADMIN — ATORVASTATIN CALCIUM 40 MILLIGRAM(S): 80 TABLET, FILM COATED ORAL at 21:51

## 2022-08-09 RX ADMIN — APIXABAN 5 MILLIGRAM(S): 2.5 TABLET, FILM COATED ORAL at 06:46

## 2022-08-09 RX ADMIN — APIXABAN 5 MILLIGRAM(S): 2.5 TABLET, FILM COATED ORAL at 18:15

## 2022-08-09 RX ADMIN — CHLORHEXIDINE GLUCONATE 1 APPLICATION(S): 213 SOLUTION TOPICAL at 12:27

## 2022-08-09 NOTE — DISCHARGE NOTE PROVIDER - NSFOLLOWUPCLINICS_GEN_ALL_ED_FT
[facility] [place]  Internal Medicine  [address] [place] [address]  Phone: [phone]  Fax: [phone]

## 2022-08-09 NOTE — PROGRESS NOTE ADULT - SUBJECTIVE AND OBJECTIVE BOX
Cardiology Fellow Consult Progress Note    Subjective:    No acute events overnight. No significant tele events. ROS negative at the bedside.     REVIEW OF SYSTEMS:  CONSTITUTIONAL: No weakness, fevers or chills  EYES/ENT: No visual changes;  No dysphagia  NECK: No pain or stiffness  RESPIRATORY: No cough, wheezing, hemoptysis; No shortness of breath  CARDIOVASCULAR: No chest pain or palpitations; No lower extremity edema  GASTROINTESTINAL: No abdominal or epigastric pain. No nausea, vomiting, or hematemesis; No diarrhea or constipation. No melena or hematochezia.  BACK: No back pain  GENITOURINARY: No dysuria, frequency or hematuria  NEUROLOGICAL: No numbness or weakness  SKIN: No itching, burning, rashes, or lesions   All other review of systems is negative unless indicated above.    Physical Exam:  T(F): 98.7 (08-09), Max: 98.9 (08-08)  HR: 89 (08-09) (89 - 97)  BP: 148/72 (08-09) (136/78 - 157/97)  RR: 18 (08-09)  SpO2: 98% (08-09)  GENERAL: No acute distress, well-developed  HEAD:  Atraumatic, Normocephalic  ENT: EOMI, PERRLA, conjunctiva and sclera clear, Neck supple, No JVD, moist mucosa  CHEST/LUNG: Mild rales in LLL   BACK: No spinal tenderness  HEART: Regular rate and rhythm; No murmurs, rubs, or gallops  ABDOMEN: Soft, Nontender, Nondistended; Bowel sounds present  EXTREMITIES:  No clubbing, cyanosis, or edema  PSYCH: Nl behavior, nl affect  NEUROLOGY: AAOx3, non-focal, cranial nerves intact  SKIN: Normal color, No rashes or lesions  LINES:    Cardiovascular diagnostic testings: personally reviewed    Imaging diagnostic testings: personally reviewed    Labs: Personally reviewed                        15.4   6.90  )-----------( 271      ( 08 Aug 2022 06:53 )             46.9     08-08    139  |  102  |  21  ----------------------------<  108<H>  3.9   |  27  |  0.89    Ca    8.5      08 Aug 2022 06:51          CARDIAC MARKERS ( 04 Aug 2022 20:39 )  21 ng/L / x     / x     / x     / x     / x      CARDIAC MARKERS ( 04 Aug 2022 17:06 )  19 ng/L / x     / x     / x     / x     / x            Serum Pro-Brain Natriuretic Peptide: 2105 pg/mL (08-04 @ 17:06)           Cardiology Fellow Consult Progress Note    Subjective:    No acute events overnight. No significant tele events. Patient denies any chest pain, SOB, N/V or abdominal pain. States that he is able to lay flat and walk with no SOB.     REVIEW OF SYSTEMS:  CONSTITUTIONAL: No weakness, fevers or chills  EYES/ENT: No visual changes;  No dysphagia  NECK: No pain or stiffness  RESPIRATORY: No cough, wheezing, hemoptysis; No shortness of breath  CARDIOVASCULAR: No chest pain or palpitations; No lower extremity edema  GASTROINTESTINAL: No abdominal or epigastric pain. No nausea, vomiting, or hematemesis; No diarrhea or constipation. No melena or hematochezia.  BACK: No back pain  GENITOURINARY: No dysuria, frequency or hematuria  NEUROLOGICAL: No numbness or weakness  SKIN: No itching, burning, rashes, or lesions   All other review of systems is negative unless indicated above.    Physical Exam:  T(F): 98.7 (08-09), Max: 98.9 (08-08)  HR: 89 (08-09) (89 - 97)  BP: 148/72 (08-09) (136/78 - 157/97)  RR: 18 (08-09)  SpO2: 98% (08-09)  GENERAL: No acute distress, well-developed  HEAD:  Atraumatic, Normocephalic  ENT: EOMI, PERRLA, conjunctiva and sclera clear, Neck supple, No JVD, moist mucosa  CHEST/LUNG: Mild rales in LLL   BACK: No spinal tenderness  HEART: Regular rate and rhythm; No murmurs, rubs, or gallops  ABDOMEN: Soft, Nontender, Nondistended; Bowel sounds present  EXTREMITIES:  No clubbing, cyanosis, or edema  PSYCH: Nl behavior, nl affect  NEUROLOGY: AAOx3, non-focal, cranial nerves intact  SKIN: Normal color, No rashes or lesions  LINES:    Cardiovascular diagnostic testings: personally reviewed    Imaging diagnostic testings: personally reviewed    Labs: Personally reviewed                        15.4   6.90  )-----------( 271      ( 08 Aug 2022 06:53 )             46.9     08-08    139  |  102  |  21  ----------------------------<  108<H>  3.9   |  27  |  0.89    Ca    8.5      08 Aug 2022 06:51          CARDIAC MARKERS ( 04 Aug 2022 20:39 )  21 ng/L / x     / x     / x     / x     / x      CARDIAC MARKERS ( 04 Aug 2022 17:06 )  19 ng/L / x     / x     / x     / x     / x            Serum Pro-Brain Natriuretic Peptide: 2105 pg/mL (08-04 @ 17:06)           Cardiology Fellow Consult Progress Note    Subjective:  No acute events overnight. No significant tele events. Patient denies any chest pain, SOB, N/V or abdominal pain. States that he is able to lay flat and walk with no SOB.       REVIEW OF SYSTEMS:  CONSTITUTIONAL: No weakness, fevers or chills  EYES/ENT: No visual changes;  No dysphagia  NECK: No pain or stiffness  RESPIRATORY: No cough, wheezing, hemoptysis; No shortness of breath  CARDIOVASCULAR: No chest pain or palpitations; No lower extremity edema  GASTROINTESTINAL: No abdominal or epigastric pain. No nausea, vomiting, or hematemesis; No diarrhea or constipation. No melena or hematochezia.  BACK: No back pain  GENITOURINARY: No dysuria, frequency or hematuria  NEUROLOGICAL: No numbness or weakness  SKIN: No itching, burning, rashes, or lesions   All other review of systems is negative unless indicated above.    Physical Exam:  T(F): 98.7 (08-09), Max: 98.9 (08-08)  HR: 89 (08-09) (89 - 97)  BP: 148/72 (08-09) (136/78 - 157/97)  RR: 18 (08-09)  SpO2: 98% (08-09)    GENERAL: No acute distress, well-developed  HEAD:  Atraumatic, Normocephalic  ENT: EOMI, PERRLA, conjunctiva and sclera clear, Neck supple, No JVD, moist mucosa  CHEST/LUNG: Mild rales in LLL   BACK: No spinal tenderness  HEART: Regular rate and rhythm; No murmurs, rubs, or gallops  ABDOMEN: Soft, Nontender, Nondistended; Bowel sounds present  EXTREMITIES:  No clubbing, cyanosis, or edema  PSYCH: Nl behavior, nl affect  NEUROLOGY: AAOx3, non-focal, cranial nerves intact  SKIN: Normal color, No rashes or lesions      Labs:                       15.4   6.90  )-----------( 271      ( 08 Aug 2022 06:53 )             46.9     08-08  139  |  102  |  21  ----------------------------<  108<H>  3.9   |  27  |  0.89    Ca    8.5      08 Aug 2022 06:51    CARDIAC MARKERS ( 04 Aug 2022 20:39 )  21 ng/L / x     / x     / x     / x     / x      CARDIAC MARKERS ( 04 Aug 2022 17:06 )  19 ng/L / x     / x     / x     / x     / x        Serum Pro-Brain Natriuretic Peptide: 2105 pg/mL (08-04 @ 17:06)

## 2022-08-09 NOTE — PROGRESS NOTE ADULT - SUBJECTIVE AND OBJECTIVE BOX
Patient is a 56y old  Male who presents with a chief complaint of SOB (09 Aug 2022 13:32)      INTERVAL HPI/OVERNIGHT EVENTS: doing well , denies any c/o of CP or SOB   T(C): 36.7 (08-09-22 @ 12:14), Max: 37.1 (08-09-22 @ 05:30)  HR: 84 (08-09-22 @ 12:14) (84 - 89)  BP: 119/82 (08-09-22 @ 12:14) (119/82 - 148/72)  RR: 18 (08-09-22 @ 12:14) (18 - 18)  SpO2: 96% (08-09-22 @ 12:14) (96% - 98%)  Wt(kg): --  I&O's Summary    08 Aug 2022 07:01  -  09 Aug 2022 07:00  --------------------------------------------------------  IN: 720 mL / OUT: 0 mL / NET: 720 mL    09 Aug 2022 07:01  -  09 Aug 2022 22:19  --------------------------------------------------------  IN: 720 mL / OUT: 0 mL / NET: 720 mL        PAST MEDICAL & SURGICAL HISTORY:  Congestive heart failure      Afib          SOCIAL HISTORY  Alcohol:  Tobacco:  Illicit substance use:    FAMILY HISTORY:    REVIEW OF SYSTEMS:  CONSTITUTIONAL: No fever, weight loss, or fatigue  EYES: No eye pain, visual disturbances, or discharge  ENMT:  No difficulty hearing, tinnitus, vertigo; No sinus or throat pain  NECK: No pain or stiffness  RESPIRATORY: No cough, wheezing, chills or hemoptysis; No shortness of breath  CARDIOVASCULAR: No chest pain, palpitations, dizziness, or leg swelling  GASTROINTESTINAL: No abdominal or epigastric pain. No nausea, vomiting, or hematemesis; No diarrhea or constipation. No melena or hematochezia.  GENITOURINARY: No dysuria, frequency, hematuria, or incontinence  NEUROLOGICAL: No headaches, memory loss, loss of strength, numbness, or tremors  SKIN: No itching, burning, rashes, or lesions   LYMPH NODES: No enlarged glands  ENDOCRINE: No heat or cold intolerance; No hair loss  MUSCULOSKELETAL: No joint pain or swelling; No muscle, back, or extremity pain  PSYCHIATRIC: No depression, anxiety, mood swings, or difficulty sleeping  HEME/LYMPH: No easy bruising, or bleeding gums  ALLERY AND IMMUNOLOGIC: No hives or eczema    RADIOLOGY & ADDITIONAL TESTS:    Imaging Personally Reviewed:  [ ] YES  [ ] NO    Consultant(s) Notes Reviewed:  [ ] YES  [ ] NO    PHYSICAL EXAM:  GENERAL: NAD, well-groomed, well-developed  HEAD:  Atraumatic, Normocephalic  EYES: EOMI, PERRLA, conjunctiva and sclera clear  ENMT: No tonsillar erythema, exudates, or enlargement; Moist mucous membranes, Good dentition, No lesions  NECK: Supple, No JVD, Normal thyroid  NERVOUS SYSTEM:  Alert & Oriented X3, Good concentration; Motor Strength 5/5 B/L upper and lower extremities; DTRs 2+ intact and symmetric  CHEST/LUNG: Clear to percussion bilaterally; No rales, rhonchi, wheezing, or rubs  HEART: Regular rate and rhythm; No murmurs, rubs, or gallops  ABDOMEN: Soft, Nontender, Nondistended; Bowel sounds present  EXTREMITIES:  2+ Peripheral Pulses, No clubbing, cyanosis, or edema  LYMPH: No lymphadenopathy noted  SKIN: No rashes or lesions    LABS:                        15.4   6.90  )-----------( 271      ( 08 Aug 2022 06:53 )             46.9     08-09    144  |  102  |  19  ----------------------------<  106<H>  4.2   |  30  |  0.93    Ca    9.1      09 Aug 2022 09:04          CAPILLARY BLOOD GLUCOSE                MEDICATIONS  (STANDING):  apixaban 5 milliGRAM(s) Oral two times a day  aspirin enteric coated 81 milliGRAM(s) Oral daily  atorvastatin 40 milliGRAM(s) Oral at bedtime  chlorhexidine 4% Liquid 1 Application(s) Topical daily  furosemide    Tablet 80 milliGRAM(s) Oral daily  metoprolol succinate ER 50 milliGRAM(s) Oral daily  sacubitril 49 mG/valsartan 51 mG 1 Tablet(s) Oral two times a day  spironolactone 25 milliGRAM(s) Oral daily    MEDICATIONS  (PRN):      Care Discussed with Consultants/Other Providers [ ] YES  [ ] NO

## 2022-08-09 NOTE — DISCHARGE NOTE PROVIDER - HOSPITAL COURSE
56yoM PMHx of Afib s/p DCCV on Eliquis, HFrEF (EF 25%) p/w orthopnea, lower abdominal swelling, b/l LE swelling for months, last saw cardiologist one month ago for TTE and was given ACE, beta blockers and Eliquis, but prior to this had not seen the cardiologist in over 1 year (during his admission when he was found to have new Afib). Stated that he was given one month of medication after discharge but finished medication in 10/2021 and did not follow up until 7/2022. Last night described difficulty sleeping 2/2 orthopnea, called cardiologist who recommended to come to ED.    Acute decompensated systolic heart failure :  -pt. didn't follow with his cardio as out pt. and ran out of meds   decrease lasix to 80 mg IV q daily change to PO   cardio & EP following.   breathing much better     Ch afib :   on eliquis   rate controlled   s/p cardioversion (8/15)    # CHF :  c/w toprol XL   d/c losartan and started on entresto   aldactone 25 mg added       HLD :  -c/w statin          56yoM PMHx of Afib s/p DCCV on Eliquis, HFrEF (EF 25%) p/w orthopnea, lower abdominal swelling, b/l LE swelling for months, last saw cardiologist one month ago for TTE and was given ACE, beta blockers and Eliquis, but prior to this had not seen the cardiologist in over 1 year (during his admission when he was found to have new Afib). Stated that he was given one month of medication after discharge but finished medication in 10/2021 and did not follow up until 7/2022. Last night described difficulty sleeping 2/2 orthopnea, called cardiologist who recommended to come to ED.    Acute decompensated systolic heart failure :  -pt. didn't follow with his cardio as out pt. and ran out of meds   decrease lasix to 80 mg IV q daily change to PO   cardio & EP following.   breathing much better     Ch afib :   on eliquis   rate controlled   s/p cardioversion (8/15), maintain NSR.   started amio loading.     # CHF :  c/w toprol XL   d/c losartan and started on entresto   aldactone 25 mg added       HLD :  -c/w statin     pt remains stable for DC and will f/u with PCP and EP.

## 2022-08-09 NOTE — DISCHARGE NOTE PROVIDER - NSDCCPCAREPLAN_GEN_ALL_CORE_FT
PRINCIPAL DISCHARGE DIAGNOSIS  Diagnosis: Acute congestive heart failure  Assessment and Plan of Treatment: Weigh yourself daily.  If you gain 3lbs in 3 days, or 5lbs in a week call your Health Care Provider.  Do not eat or drink foods containing more than 2000mg of salt (sodium) in your diet every day.  Call your Health Care Provider if you have any swelling or increased swelling in your feet, ankles, and/or stomach.  Take all of your medication as directed.  If you become dizzy call your Health Care Provider.        SECONDARY DISCHARGE DIAGNOSES  Diagnosis: Afib  Assessment and Plan of Treatment: s/p cardioversion (8/15).   Atrial fibrillation is the most common heart rhythm problem & has the risk of stroke & heart attack  It helps if you control your blood pressure, not drink more than 1-2 alcohol drinks per day, cut down on caffeine, getting treatment for over active thyroid gland, & getting exercise  Call your doctor if you feel your heart racing or beating unusually, chest tightness or pain, lightheaded, faint, shortness of breath especially with exercise  It is important to take your heart medication as prescribed  You may be on anticoagulation which is very important to take as directed - you may need blood work to monitor drug levels      Diagnosis: HLD (hyperlipidemia)  Assessment and Plan of Treatment: Follow up with PCP for treatment goals, continue medication, have liver function testing every 3 months as anti lipid medications can cause liver irritation, eat low fat, low cholesterol meals       PRINCIPAL DISCHARGE DIAGNOSIS  Diagnosis: Acute congestive heart failure  Assessment and Plan of Treatment: continue with lasix, spironolactone and entresto as directed  Weigh yourself daily.  If you gain 3lbs in 3 days, or 5lbs in a week call your Health Care Provider.  Do not eat or drink foods containing more than 2000mg of salt (sodium) in your diet every day.  Call your Health Care Provider if you have any swelling or increased swelling in your feet, ankles, and/or stomach.  Take all of your medication as directed.  If you become dizzy call your Health Care Provider.        SECONDARY DISCHARGE DIAGNOSES  Diagnosis: Afib  Assessment and Plan of Treatment: s/p cardioversion (8/15).   started amiodarone. continue it as directed.   Atrial fibrillation is the most common heart rhythm problem & has the risk of stroke & heart attack  It helps if you control your blood pressure, not drink more than 1-2 alcohol drinks per day, cut down on caffeine, getting treatment for over active thyroid gland, & getting exercise  Call your doctor if you feel your heart racing or beating unusually, chest tightness or pain, lightheaded, faint, shortness of breath especially with exercise  It is important to take your heart medication as prescribed  You may be on anticoagulation which is very important to take as directed - you may need blood work to monitor drug levels      Diagnosis: HLD (hyperlipidemia)  Assessment and Plan of Treatment: Follow up with PCP for treatment goals, continue medication, have liver function testing every 3 months as anti lipid medications can cause liver irritation, eat low fat, low cholesterol meals

## 2022-08-09 NOTE — DISCHARGE NOTE PROVIDER - NSDCFUADDAPPT_GEN_ALL_CORE_FT
please follow up with your PCP in 1 week (if you don't have PCP, f/u with medicine clinic).  follow up with EP as instructed.  please follow up with your PCP in 1 week (if you don't have PCP, f/u with medicine clinic).  Follow up in EP clinic on September 12 @ 11:00 AM

## 2022-08-09 NOTE — DISCHARGE NOTE PROVIDER - CARE PROVIDER_API CALL
EP clinic,   Appointment made by ELIE FREEMAN.  Phone: (   )    -  Fax: (   )    -  Follow Up Time:

## 2022-08-09 NOTE — PROGRESS NOTE ADULT - ASSESSMENT
A/P     # Acute decompensated systolic heart failure :  -pt. didn't follow with his cardio as out pt. and ran out of meds   decrease lasix to 80 mg IV q daily change to PO   cardio following   breathing much better     Ch afib :   -on eliquis   rate controlled   plan is for cardioversion     # CHF :  c/w toprol XL   d/c losartan and started on entresto   aldactone 25 mg added       HLD :  -c/w statin     Noncompliance with meds : counseling done     dispo: Cardioversion was not done 2/2 covid-19 pos status , will be done when off isolation.   as per cardio : pt. need to stay in-patient for cardioversion

## 2022-08-09 NOTE — DISCHARGE NOTE PROVIDER - NSDCFUSCHEDAPPT_GEN_ALL_CORE_FT
Princess Rush  [facility]  CARDIOLOGY [address]  Scheduled Appointment: 08/24/2022     Princess Rush  [facility]  CARDIOLOGY [address]  Scheduled Appointment: 08/24/2022    Elvin Hamm  [facility]  ELECTROPH [address]  Scheduled Appointment: 09/12/2022

## 2022-08-09 NOTE — DISCHARGE NOTE PROVIDER - NSDCMRMEDTOKEN_GEN_ALL_CORE_FT
apixaban 5 mg oral tablet: 1 tab(s) orally every 12 hours  lisinopril 10 mg oral tablet: 1 tab(s) orally once a day  Nyquil Cold and Flu oral capsule: 2 cap(s) orally , As Needed  sacubitril-valsartan 49 mg-51 mg oral tablet: 1 tab(s) orally 2 times a day  Toprol-XL 50 mg oral tablet, extended release: 1 tab(s) orally once a day    apixaban 5 mg oral tablet: 1 tab(s) orally every 12 hours  aspirin 81 mg oral delayed release tablet: 1 tab(s) orally once a day  lisinopril 10 mg oral tablet: 1 tab(s) orally once a day  Nyquil Cold and Flu oral capsule: 2 cap(s) orally , As Needed  sacubitril-valsartan 49 mg-51 mg oral tablet: 1 tab(s) orally 2 times a day  Toprol-XL 50 mg oral tablet, extended release: 1 tab(s) orally once a day    amiodarone 200 mg oral tablet: 2 tabs (400mg) 2 times a day until 8/22 and 1 tab (200mg) 2 times a day from 8/23 to 8/29 and then 1 tab (200mg) daily  apixaban 5 mg oral tablet: 1 tab(s) orally every 12 hours  aspirin 81 mg oral delayed release tablet: 1 tab(s) orally once a day  atorvastatin 40 mg oral tablet: 1 tab(s) orally once a day (at bedtime)  furosemide 80 mg oral tablet: 1 tab(s) orally once a day  sacubitril-valsartan 49 mg-51 mg oral tablet: 1 tab(s) orally 2 times a day  spironolactone 25 mg oral tablet: 1 tab(s) orally once a day  Toprol-XL 50 mg oral tablet, extended release: 1 tab(s) orally once a day

## 2022-08-10 PROCEDURE — 93306 TTE W/DOPPLER COMPLETE: CPT | Mod: 26

## 2022-08-10 PROCEDURE — 99232 SBSQ HOSP IP/OBS MODERATE 35: CPT | Mod: GC

## 2022-08-10 RX ADMIN — CHLORHEXIDINE GLUCONATE 1 APPLICATION(S): 213 SOLUTION TOPICAL at 12:15

## 2022-08-10 RX ADMIN — Medication 81 MILLIGRAM(S): at 12:13

## 2022-08-10 RX ADMIN — Medication 50 MILLIGRAM(S): at 05:57

## 2022-08-10 RX ADMIN — APIXABAN 5 MILLIGRAM(S): 2.5 TABLET, FILM COATED ORAL at 05:57

## 2022-08-10 RX ADMIN — ATORVASTATIN CALCIUM 40 MILLIGRAM(S): 80 TABLET, FILM COATED ORAL at 21:41

## 2022-08-10 RX ADMIN — SPIRONOLACTONE 25 MILLIGRAM(S): 25 TABLET, FILM COATED ORAL at 05:57

## 2022-08-10 RX ADMIN — SACUBITRIL AND VALSARTAN 1 TABLET(S): 24; 26 TABLET, FILM COATED ORAL at 05:57

## 2022-08-10 RX ADMIN — SACUBITRIL AND VALSARTAN 1 TABLET(S): 24; 26 TABLET, FILM COATED ORAL at 17:20

## 2022-08-10 RX ADMIN — APIXABAN 5 MILLIGRAM(S): 2.5 TABLET, FILM COATED ORAL at 17:20

## 2022-08-10 RX ADMIN — Medication 80 MILLIGRAM(S): at 05:57

## 2022-08-10 NOTE — PROGRESS NOTE ADULT - ASSESSMENT
57 yo man PMHx of paroxysmal Afib and NICM TTE EF 25%, who was admitted for acute on chronic decompensated HF exacerbation in setting of COVID-19 infection, on IV diuretics.  **********NOTE INCOMPLETE************    REC:  #1. Acute on chronic decompensated HF exacerbation  - I/O not accurately recorded, please order strict I/O and obtain daily standing weight  - Currently on GDMT: Toprol XL 50mg daily, Entresto 49/51 BID, and Aldactone 25mg daily  - Would recommend continuing PO Lasix 80mg     #2.  Afib  - Tele shows Afib/Aflutter in the 70s to 90s   - Eliquis 5mg BID for systemic embolization prevention  - C/W BB as above  - Plan for GENE/DCCV when patient off isolation from COVID (dependent on infection control)      Bereket Villalta M.D.  PGY-2; cardiology resident    Plan discussed with cardiology fellow and resident.  Patient seen and examined.  Hx., exam and labs as above.  I agree with the assessment and recommendations, which I have reviewed and edited where appropriate.  John Del Rosario M.D.  Cardiology Attending, Consult Service  For Cardiology consults and questions, all Cardiology service information can be found 24/7 on [email or web address] - use password: [email or web address] to log in.     55 yo man PMHx of paroxysmal Afib and NICM TTE EF 25%, who was admitted for acute on chronic decompensated HF exacerbation in setting of COVID-19 infection, on IV diuretics.    REC:  #1. Acute on chronic decompensated HF exacerbation  - I/O not accurately recorded, please order strict I/O and obtain daily standing weight  - Currently on GDMT: Toprol XL 50mg daily, Entresto 49/51 BID, and Aldactone 25mg daily  - Would recommend continuing PO Lasix 80mg     #2.  Afib  - Tele shows Afib/Aflutter in the 70s to 90s   - Eliquis 5mg BID for systemic embolization prevention  - C/W BB as above  - Plan for GENE/DCCV when patient off isolation from COVID (dependent on infection control)      Bereket Villalta M.D.  PGY-2; cardiology resident    Plan discussed with cardiology fellow and resident.  Patient seen and examined.  Hx., exam and labs as above.  I agree with the assessment and recommendations, which I have reviewed and edited where appropriate.  John Del Rosario M.D.  Cardiology Attending, Consult Service  For Cardiology consults and questions, all Cardiology service information can be found 24/7 on [email or web address] - use password: [email or web address] to log in.

## 2022-08-10 NOTE — PROGRESS NOTE ADULT - SUBJECTIVE AND OBJECTIVE BOX
Patient is a 56y old  Male who presents with a chief complaint of SOB (10 Aug 2022 08:22)      INTERVAL HPI/OVERNIGHT EVENTS:  T(C): 36.7 (08-10-22 @ 21:42), Max: 36.7 (08-10-22 @ 06:00)  HR: 97 (08-10-22 @ 21:42) (73 - 97)  BP: 133/96 (08-10-22 @ 21:42) (102/63 - 139/89)  RR: 18 (08-10-22 @ 21:42) (18 - 18)  SpO2: 96% (08-10-22 @ 21:42) (96% - 98%)  Wt(kg): --  I&O's Summary    09 Aug 2022 07:01  -  10 Aug 2022 07:00  --------------------------------------------------------  IN: 1080 mL / OUT: 0 mL / NET: 1080 mL    10 Aug 2022 07:01  -  10 Aug 2022 23:31  --------------------------------------------------------  IN: 840 mL / OUT: 0 mL / NET: 840 mL        PAST MEDICAL & SURGICAL HISTORY:  Congestive heart failure      Afib          SOCIAL HISTORY  Alcohol:  Tobacco:  Illicit substance use:    FAMILY HISTORY:    REVIEW OF SYSTEMS:  CONSTITUTIONAL: No fever, weight loss, or fatigue  EYES: No eye pain, visual disturbances, or discharge  ENMT:  No difficulty hearing, tinnitus, vertigo; No sinus or throat pain  NECK: No pain or stiffness  RESPIRATORY: No cough, wheezing, chills or hemoptysis; No shortness of breath  CARDIOVASCULAR: No chest pain, palpitations, dizziness, or leg swelling  GASTROINTESTINAL: No abdominal or epigastric pain. No nausea, vomiting, or hematemesis; No diarrhea or constipation. No melena or hematochezia.  GENITOURINARY: No dysuria, frequency, hematuria, or incontinence  NEUROLOGICAL: No headaches, memory loss, loss of strength, numbness, or tremors  SKIN: No itching, burning, rashes, or lesions   LYMPH NODES: No enlarged glands  ENDOCRINE: No heat or cold intolerance; No hair loss  MUSCULOSKELETAL: No joint pain or swelling; No muscle, back, or extremity pain  PSYCHIATRIC: No depression, anxiety, mood swings, or difficulty sleeping  HEME/LYMPH: No easy bruising, or bleeding gums  ALLERY AND IMMUNOLOGIC: No hives or eczema    RADIOLOGY & ADDITIONAL TESTS:    Imaging Personally Reviewed:  [ ] YES  [ ] NO    Consultant(s) Notes Reviewed:  [ ] YES  [ ] NO    PHYSICAL EXAM:  GENERAL: NAD, well-groomed, well-developed  HEAD:  Atraumatic, Normocephalic  EYES: EOMI, PERRLA, conjunctiva and sclera clear  ENMT: No tonsillar erythema, exudates, or enlargement; Moist mucous membranes, Good dentition, No lesions  NECK: Supple, No JVD, Normal thyroid  NERVOUS SYSTEM:  Alert & Oriented X3, Good concentration; Motor Strength 5/5 B/L upper and lower extremities; DTRs 2+ intact and symmetric  CHEST/LUNG: Clear to percussion bilaterally; No rales, rhonchi, wheezing, or rubs  HEART: Regular rate and rhythm; No murmurs, rubs, or gallops  ABDOMEN: Soft, Nontender, Nondistended; Bowel sounds present  EXTREMITIES:  2+ Peripheral Pulses, No clubbing, cyanosis, or edema  LYMPH: No lymphadenopathy noted  SKIN: No rashes or lesions    LABS:    08-09    144  |  102  |  19  ----------------------------<  106<H>  4.2   |  30  |  0.93    Ca    9.1      09 Aug 2022 09:04          CAPILLARY BLOOD GLUCOSE                MEDICATIONS  (STANDING):  apixaban 5 milliGRAM(s) Oral two times a day  aspirin enteric coated 81 milliGRAM(s) Oral daily  atorvastatin 40 milliGRAM(s) Oral at bedtime  chlorhexidine 4% Liquid 1 Application(s) Topical daily  furosemide    Tablet 80 milliGRAM(s) Oral daily  metoprolol succinate ER 50 milliGRAM(s) Oral daily  sacubitril 49 mG/valsartan 51 mG 1 Tablet(s) Oral two times a day  spironolactone 25 milliGRAM(s) Oral daily    MEDICATIONS  (PRN):      Care Discussed with Consultants/Other Providers [ ] YES  [ ] NO Patient is a 56y old  Male who presents with a chief complaint of SOB (10 Aug 2022 08:22)      INTERVAL HPI/OVERNIGHT EVENTS: seen and examined, denies any SOB   T(C): 36.7 (08-10-22 @ 21:42), Max: 36.7 (08-10-22 @ 06:00)  HR: 97 (08-10-22 @ 21:42) (73 - 97)  BP: 133/96 (08-10-22 @ 21:42) (102/63 - 139/89)  RR: 18 (08-10-22 @ 21:42) (18 - 18)  SpO2: 96% (08-10-22 @ 21:42) (96% - 98%)  Wt(kg): --  I&O's Summary    09 Aug 2022 07:01  -  10 Aug 2022 07:00  --------------------------------------------------------  IN: 1080 mL / OUT: 0 mL / NET: 1080 mL    10 Aug 2022 07:01  -  10 Aug 2022 23:31  --------------------------------------------------------  IN: 840 mL / OUT: 0 mL / NET: 840 mL        PAST MEDICAL & SURGICAL HISTORY:  Congestive heart failure      Afib          SOCIAL HISTORY  Alcohol:  Tobacco:  Illicit substance use:    FAMILY HISTORY:    REVIEW OF SYSTEMS:  CONSTITUTIONAL: No fever, weight loss, or fatigue  EYES: No eye pain, visual disturbances, or discharge  ENMT:  No difficulty hearing, tinnitus, vertigo; No sinus or throat pain  NECK: No pain or stiffness  RESPIRATORY: No cough, wheezing, chills or hemoptysis; No shortness of breath  CARDIOVASCULAR: No chest pain, palpitations, dizziness, or leg swelling  GASTROINTESTINAL: No abdominal or epigastric pain. No nausea, vomiting, or hematemesis; No diarrhea or constipation. No melena or hematochezia.  GENITOURINARY: No dysuria, frequency, hematuria, or incontinence  NEUROLOGICAL: No headaches, memory loss, loss of strength, numbness, or tremors  SKIN: No itching, burning, rashes, or lesions   LYMPH NODES: No enlarged glands  ENDOCRINE: No heat or cold intolerance; No hair loss  MUSCULOSKELETAL: No joint pain or swelling; No muscle, back, or extremity pain  PSYCHIATRIC: No depression, anxiety, mood swings, or difficulty sleeping  HEME/LYMPH: No easy bruising, or bleeding gums  ALLERY AND IMMUNOLOGIC: No hives or eczema    RADIOLOGY & ADDITIONAL TESTS:    Imaging Personally Reviewed:  [ ] YES  [ ] NO    Consultant(s) Notes Reviewed:  [ ] YES  [ ] NO    PHYSICAL EXAM:  GENERAL: NAD, well-groomed, well-developed  HEAD:  Atraumatic, Normocephalic  EYES: EOMI, PERRLA, conjunctiva and sclera clear  ENMT: No tonsillar erythema, exudates, or enlargement; Moist mucous membranes, Good dentition, No lesions  NECK: Supple, No JVD, Normal thyroid  NERVOUS SYSTEM:  Alert & Oriented X3, Good concentration; Motor Strength 5/5 B/L upper and lower extremities; DTRs 2+ intact and symmetric  CHEST/LUNG: Clear to percussion bilaterally; No rales, rhonchi, wheezing, or rubs  HEART: Regular rate and rhythm; No murmurs, rubs, or gallops  ABDOMEN: Soft, Nontender, Nondistended; Bowel sounds present  EXTREMITIES:  2+ Peripheral Pulses, No clubbing, cyanosis, or edema  LYMPH: No lymphadenopathy noted  SKIN: No rashes or lesions    LABS:    08-09    144  |  102  |  19  ----------------------------<  106<H>  4.2   |  30  |  0.93    Ca    9.1      09 Aug 2022 09:04          CAPILLARY BLOOD GLUCOSE                MEDICATIONS  (STANDING):  apixaban 5 milliGRAM(s) Oral two times a day  aspirin enteric coated 81 milliGRAM(s) Oral daily  atorvastatin 40 milliGRAM(s) Oral at bedtime  chlorhexidine 4% Liquid 1 Application(s) Topical daily  furosemide    Tablet 80 milliGRAM(s) Oral daily  metoprolol succinate ER 50 milliGRAM(s) Oral daily  sacubitril 49 mG/valsartan 51 mG 1 Tablet(s) Oral two times a day  spironolactone 25 milliGRAM(s) Oral daily    MEDICATIONS  (PRN):      Care Discussed with Consultants/Other Providers [ ] YES  [ ] NO

## 2022-08-10 NOTE — PROGRESS NOTE ADULT - SUBJECTIVE AND OBJECTIVE BOX
Cardiology Fellow Consult Progress Note    Subjective:    No acute events overnight. No significant tele events. ROS negative at the bedside.     REVIEW OF SYSTEMS:  CONSTITUTIONAL: No weakness, fevers or chills  EYES/ENT: No visual changes;  No dysphagia  NECK: No pain or stiffness  RESPIRATORY: No cough, wheezing, hemoptysis; No shortness of breath  CARDIOVASCULAR: No chest pain or palpitations; No lower extremity edema  GASTROINTESTINAL: No abdominal or epigastric pain. No nausea, vomiting, or hematemesis; No diarrhea or constipation. No melena or hematochezia.  BACK: No back pain  GENITOURINARY: No dysuria, frequency or hematuria  NEUROLOGICAL: No numbness or weakness  SKIN: No itching, burning, rashes, or lesions   All other review of systems is negative unless indicated above.    Physical Exam:  T(F): 98 (08-10), Max: 98.5 (08-09)  HR: 84 (08-10) (73 - 84)  BP: 139/89 (08-10) (119/82 - 139/89)  RR: 18 (08-10)  SpO2: 97% (08-10)  GENERAL: No acute distress, well-developed  HEAD:  Atraumatic, Normocephalic  ENT: EOMI, PERRLA, conjunctiva and sclera clear, Neck supple, No JVD, moist mucosa  CHEST/LUNG: Clear to auscultation bilaterally; No wheeze, equal breath sounds bilaterally   BACK: No spinal tenderness  HEART: Regular rate and rhythm; No murmurs, rubs, or gallops  ABDOMEN: Soft, Nontender, Nondistended; Bowel sounds present  EXTREMITIES:  No clubbing, cyanosis, or edema  PSYCH: Nl behavior, nl affect  NEUROLOGY: AAOx3, non-focal, cranial nerves intact  SKIN: Normal color, No rashes or lesions  LINES:    Cardiovascular diagnostic testings: personally reviewed    Imaging diagnostic testings: personally reviewed    Labs: Personally reviewed    08-09    144  |  102  |  19  ----------------------------<  106<H>  4.2   |  30  |  0.93    Ca    9.1      09 Aug 2022 09:04          CARDIAC MARKERS ( 04 Aug 2022 20:39 )  21 ng/L / x     / x     / x     / x     / x      CARDIAC MARKERS ( 04 Aug 2022 17:06 )  19 ng/L / x     / x     / x     / x     / x            Serum Pro-Brain Natriuretic Peptide: 2105 pg/mL (08-04 @ 17:06)

## 2022-08-11 LAB
ANION GAP SERPL CALC-SCNC: 13 MMOL/L — SIGNIFICANT CHANGE UP (ref 5–17)
BUN SERPL-MCNC: 26 MG/DL — HIGH (ref 7–23)
CALCIUM SERPL-MCNC: 9.5 MG/DL — SIGNIFICANT CHANGE UP (ref 8.4–10.5)
CHLORIDE SERPL-SCNC: 101 MMOL/L — SIGNIFICANT CHANGE UP (ref 96–108)
CO2 SERPL-SCNC: 27 MMOL/L — SIGNIFICANT CHANGE UP (ref 22–31)
CREAT SERPL-MCNC: 1.08 MG/DL — SIGNIFICANT CHANGE UP (ref 0.5–1.3)
EGFR: 81 ML/MIN/1.73M2 — SIGNIFICANT CHANGE UP
GLUCOSE SERPL-MCNC: 98 MG/DL — SIGNIFICANT CHANGE UP (ref 70–99)
HCT VFR BLD CALC: 51.1 % — HIGH (ref 39–50)
HGB BLD-MCNC: 16.1 G/DL — SIGNIFICANT CHANGE UP (ref 13–17)
MCHC RBC-ENTMCNC: 29.1 PG — SIGNIFICANT CHANGE UP (ref 27–34)
MCHC RBC-ENTMCNC: 31.5 GM/DL — LOW (ref 32–36)
MCV RBC AUTO: 92.2 FL — SIGNIFICANT CHANGE UP (ref 80–100)
NRBC # BLD: 0 /100 WBCS — SIGNIFICANT CHANGE UP (ref 0–0)
PLATELET # BLD AUTO: 283 K/UL — SIGNIFICANT CHANGE UP (ref 150–400)
POTASSIUM SERPL-MCNC: 4.6 MMOL/L — SIGNIFICANT CHANGE UP (ref 3.5–5.3)
POTASSIUM SERPL-SCNC: 4.6 MMOL/L — SIGNIFICANT CHANGE UP (ref 3.5–5.3)
RBC # BLD: 5.54 M/UL — SIGNIFICANT CHANGE UP (ref 4.2–5.8)
RBC # FLD: 13.4 % — SIGNIFICANT CHANGE UP (ref 10.3–14.5)
SODIUM SERPL-SCNC: 141 MMOL/L — SIGNIFICANT CHANGE UP (ref 135–145)
WBC # BLD: 8.25 K/UL — SIGNIFICANT CHANGE UP (ref 3.8–10.5)
WBC # FLD AUTO: 8.25 K/UL — SIGNIFICANT CHANGE UP (ref 3.8–10.5)

## 2022-08-11 PROCEDURE — 99233 SBSQ HOSP IP/OBS HIGH 50: CPT

## 2022-08-11 PROCEDURE — 99222 1ST HOSP IP/OBS MODERATE 55: CPT

## 2022-08-11 RX ADMIN — Medication 50 MILLIGRAM(S): at 05:07

## 2022-08-11 RX ADMIN — SACUBITRIL AND VALSARTAN 1 TABLET(S): 24; 26 TABLET, FILM COATED ORAL at 05:07

## 2022-08-11 RX ADMIN — CHLORHEXIDINE GLUCONATE 1 APPLICATION(S): 213 SOLUTION TOPICAL at 20:35

## 2022-08-11 RX ADMIN — SPIRONOLACTONE 25 MILLIGRAM(S): 25 TABLET, FILM COATED ORAL at 05:07

## 2022-08-11 RX ADMIN — APIXABAN 5 MILLIGRAM(S): 2.5 TABLET, FILM COATED ORAL at 05:07

## 2022-08-11 RX ADMIN — APIXABAN 5 MILLIGRAM(S): 2.5 TABLET, FILM COATED ORAL at 19:07

## 2022-08-11 RX ADMIN — Medication 80 MILLIGRAM(S): at 05:07

## 2022-08-11 RX ADMIN — Medication 81 MILLIGRAM(S): at 15:25

## 2022-08-11 RX ADMIN — SACUBITRIL AND VALSARTAN 1 TABLET(S): 24; 26 TABLET, FILM COATED ORAL at 19:07

## 2022-08-11 RX ADMIN — ATORVASTATIN CALCIUM 40 MILLIGRAM(S): 80 TABLET, FILM COATED ORAL at 23:06

## 2022-08-11 NOTE — PROGRESS NOTE ADULT - SUBJECTIVE AND OBJECTIVE BOX
Patient is a 56y old  Male who presents with a chief complaint of SOB (11 Aug 2022 16:30)      INTERVAL HPI/OVERNIGHT EVENTS: doing well , denies any c/o  T(C): 36.8 (08-11-22 @ 22:06), Max: 36.9 (08-11-22 @ 12:31)  HR: 87 (08-11-22 @ 22:06) (69 - 87)  BP: 105/72 (08-11-22 @ 22:06) (100/63 - 122/88)  RR: 17 (08-11-22 @ 22:06) (17 - 18)  SpO2: 96% (08-11-22 @ 22:06) (96% - 97%)  Wt(kg): --  I&O's Summary    10 Aug 2022 07:01  -  11 Aug 2022 07:00  --------------------------------------------------------  IN: 840 mL / OUT: 0 mL / NET: 840 mL    11 Aug 2022 07:01  -  11 Aug 2022 23:17  --------------------------------------------------------  IN: 1200 mL / OUT: 0 mL / NET: 1200 mL        PAST MEDICAL & SURGICAL HISTORY:  Congestive heart failure      Afib          SOCIAL HISTORY  Alcohol:  Tobacco:  Illicit substance use:    FAMILY HISTORY:    REVIEW OF SYSTEMS:  CONSTITUTIONAL: No fever, weight loss, or fatigue  EYES: No eye pain, visual disturbances, or discharge  ENMT:  No difficulty hearing, tinnitus, vertigo; No sinus or throat pain  NECK: No pain or stiffness  RESPIRATORY: No cough, wheezing, chills or hemoptysis; No shortness of breath  CARDIOVASCULAR: No chest pain, palpitations, dizziness, or leg swelling  GASTROINTESTINAL: No abdominal or epigastric pain. No nausea, vomiting, or hematemesis; No diarrhea or constipation. No melena or hematochezia.  GENITOURINARY: No dysuria, frequency, hematuria, or incontinence  NEUROLOGICAL: No headaches, memory loss, loss of strength, numbness, or tremors  SKIN: No itching, burning, rashes, or lesions   LYMPH NODES: No enlarged glands  ENDOCRINE: No heat or cold intolerance; No hair loss  MUSCULOSKELETAL: No joint pain or swelling; No muscle, back, or extremity pain  PSYCHIATRIC: No depression, anxiety, mood swings, or difficulty sleeping  HEME/LYMPH: No easy bruising, or bleeding gums  ALLERY AND IMMUNOLOGIC: No hives or eczema    RADIOLOGY & ADDITIONAL TESTS:    Imaging Personally Reviewed:  [ ] YES  [ ] NO    Consultant(s) Notes Reviewed:  [ ] YES  [ ] NO    PHYSICAL EXAM:  GENERAL: NAD, well-groomed, well-developed  HEAD:  Atraumatic, Normocephalic  EYES: EOMI, PERRLA, conjunctiva and sclera clear  ENMT: No tonsillar erythema, exudates, or enlargement; Moist mucous membranes, Good dentition, No lesions  NECK: Supple, No JVD, Normal thyroid  NERVOUS SYSTEM:  Alert & Oriented X3, Good concentration; Motor Strength 5/5 B/L upper and lower extremities; DTRs 2+ intact and symmetric  CHEST/LUNG: Clear to percussion bilaterally; No rales, rhonchi, wheezing, or rubs  HEART: Regular rate and rhythm; No murmurs, rubs, or gallops  ABDOMEN: Soft, Nontender, Nondistended; Bowel sounds present  EXTREMITIES:  2+ Peripheral Pulses, No clubbing, cyanosis, or edema  LYMPH: No lymphadenopathy noted  SKIN: No rashes or lesions    LABS:                        16.1   8.25  )-----------( 283      ( 11 Aug 2022 06:44 )             51.1     08-11    141  |  101  |  26<H>  ----------------------------<  98  4.6   |  27  |  1.08    Ca    9.5      11 Aug 2022 06:46          CAPILLARY BLOOD GLUCOSE                MEDICATIONS  (STANDING):  apixaban 5 milliGRAM(s) Oral two times a day  aspirin enteric coated 81 milliGRAM(s) Oral daily  atorvastatin 40 milliGRAM(s) Oral at bedtime  chlorhexidine 4% Liquid 1 Application(s) Topical daily  furosemide    Tablet 80 milliGRAM(s) Oral daily  metoprolol succinate ER 50 milliGRAM(s) Oral daily  sacubitril 49 mG/valsartan 51 mG 1 Tablet(s) Oral two times a day  spironolactone 25 milliGRAM(s) Oral daily    MEDICATIONS  (PRN):      Care Discussed with Consultants/Other Providers [ ] YES  [ ] NO

## 2022-08-11 NOTE — PROGRESS NOTE ADULT - ASSESSMENT
56 year-old man with nonischemic cardiomyopathy with severe LV dysfunction with acute on chronic heart failure in the setting of covid infection/recurrent atrial fibrillation.  -Patient for GENE/DCCV when out of covid isolation  -Continue GDMT with entresto/long-acting metoprolol/aldactone  -Will ask EP to evaluate for rhythm control post planned cardioversion  Davide Dempsey MD  Cardiology Attending  [facility]/ [facility]    For day time coverage Mon-Fri see Non-Service Consult Attending on [email or web address], password: [email or web address]; daytime weekends covered by general cardiology consult service attending.)

## 2022-08-11 NOTE — PROGRESS NOTE ADULT - SUBJECTIVE AND OBJECTIVE BOX
********CARDIOLOGY PROGRESS NOTE********                        HISTORY OF PRESENT ILLNESS:  HPI:  56Y M w/ PMH of A-fib w. dccv in 9/2021, HFrEF (EF: 25%; 7/21/22) presents to the ED with severe orthopnea last night along with dyspnea on exertion over 3 weeks. Last night, pt had to be propped up due to orthopnea until he was able to catch his breath. He was unable to sleep last night. Intermittently over the past 3 weeks, pt has noticed decreased exercise tolerance (able to walk 2 blocks) due to SOB. Pt was hospitalized in 9/21 and found to have A-fib w. dccv and discharged with home meds. Stated that he was given one month of medication after discharge but finished medication in 10/2021 and did not follow up until 7/2022 with Dr. Gustafson for SOB. Denies CP, palpitations, diaphoresis, headache, nausea, vomiting, changes in BM, blurry vision, or dysuria.  (04 Aug 2022 19:42)          Allergies    No Known Allergies    Intolerances    	    MEDICATIONS:  apixaban 5 milliGRAM(s) Oral two times a day  aspirin enteric coated 81 milliGRAM(s) Oral daily  furosemide    Tablet 80 milliGRAM(s) Oral daily  metoprolol succinate ER 50 milliGRAM(s) Oral daily  sacubitril 49 mG/valsartan 51 mG 1 Tablet(s) Oral two times a day  spironolactone 25 milliGRAM(s) Oral daily            atorvastatin 40 milliGRAM(s) Oral at bedtime    chlorhexidine 4% Liquid 1 Application(s) Topical daily      PAST MEDICAL & SURGICAL HISTORY:  Congestive heart failure      Afib          FAMILY HISTORY:      SOCIAL HISTORY:  unchanged    REVIEW OF SYSTEMS:  CONSTITUTIONAL: No fever, weight loss, or fatigue  EYES: No eye pain, visual disturbances, or discharge  ENMT:  No difficulty hearing, tinnitus, vertigo; No sinus or throat pain  NECK: No pain or stiffness  RESPIRATORY: No cough, wheezing, chills or hemoptysis; No Shortness of Breath  CARDIOVASCULAR: No chest pain, palpitations, passing out, dizziness, or leg swelling  GASTROINTESTINAL: No abdominal or epigastric pain. No nausea, vomiting, or hematemesis; No diarrhea or constipation. No melena or hematochezia.  GENITOURINARY: No dysuria, frequency, hematuria, or incontinence  NEUROLOGICAL: No headaches, memory loss, loss of strength, numbness, or tremors  SKIN: No itching, burning, rashes, or lesions   LYMPH Nodes: No enlarged glands  ENDOCRINE: No heat or cold intolerance; No hair loss  MUSCULOSKELETAL: No joint pain or swelling; No muscle, back, or extremity pain  PSYCHIATRIC: No depression, anxiety, mood swings, or difficulty sleeping  HEME/LYMPH: No easy bruising, or bleeding gums  ALLERY AND IMMUNOLOGIC: No hives or eczema	    [ ] All others negative	  [ ] Unable to obtain    PHYSICAL EXAM:  T(C): 36.9 (08-11-22 @ 12:31), Max: 36.9 (08-11-22 @ 12:31)  HR: 69 (08-11-22 @ 12:31) (69 - 97)  BP: 100/63 (08-11-22 @ 12:31) (100/63 - 133/96)  RR: 18 (08-11-22 @ 04:59) (18 - 18)  SpO2: 97% (08-11-22 @ 12:31) (96% - 97%)  Wt(kg): --  I&O's Summary    10 Aug 2022 07:01  -  11 Aug 2022 07:00  --------------------------------------------------------  IN: 840 mL / OUT: 0 mL / NET: 840 mL    11 Aug 2022 07:01  -  11 Aug 2022 14:32  --------------------------------------------------------  IN: 720 mL / OUT: 0 mL / NET: 720 mL        Appearance: Normal	  HEENT:   Normal oral mucosa, PERRL, EOMI	  Lymphatic: No lymphadenopathy  Cardiovascular: Normal S1 S2, No JVD, No murmurs, No edema  Respiratory: Lungs clear to auscultation	  Psychiatry: A & O x 3, Mood & affect appropriate  Gastrointestinal:  Soft, Non-tender, + BS	  Skin: No rashes, No ecchymoses, No cyanosis	  Neurologic: Non-focal  Extremities: Normal range of motion, No clubbing, cyanosis or edema  Vascular: Peripheral pulses palpable 2+ bilaterally      LABS:	 	    CBC Full  -  ( 11 Aug 2022 06:44 )  WBC Count : 8.25 K/uL  Hemoglobin : 16.1 g/dL  Hematocrit : 51.1 %  Platelet Count - Automated : 283 K/uL  Mean Cell Volume : 92.2 fl  Mean Cell Hemoglobin : 29.1 pg  Mean Cell Hemoglobin Concentration : 31.5 gm/dL  Auto Neutrophil # : x  Auto Lymphocyte # : x  Auto Monocyte # : x  Auto Eosinophil # : x  Auto Basophil # : x  Auto Neutrophil % : x  Auto Lymphocyte % : x  Auto Monocyte % : x  Auto Eosinophil % : x  Auto Basophil % : x    08-11    141  |  101  |  26<H>  ----------------------------<  98  4.6   |  27  |  1.08    Ca    9.5      11 Aug 2022 06:46

## 2022-08-11 NOTE — CONSULT NOTE ADULT - ASSESSMENT
57 y/o male with PMHx of NICM (LVEF 25%, normal CTA of the coronary arteries 9/28/21), Afib (on eliquis), s/p GENE/DCCV 9/27/21, presented with acute on chronic heart failure in the setting of Afib with RVR and COVID positive. EPS consulted for possible GENE/DCCV when out of COVID isolation.    Recurrent Afib with RVR in the setting of acute on chronic heart failure and COVID-19 PNA  -Unknown duration of Afib   -He was prescribed with amio load in 9/2021 with plan to follow up with EP in one month and was lost to follow up  -c/w eluqis 5mg BID  -c/w BBlocker for rate control  -c/w GDMT  -Check TSH  -c/w telemetry  -Will plan for GENE/DCCV when out of COVID isolation    ELIAZAR Mariano, NP-C  [id number]

## 2022-08-11 NOTE — CONSULT NOTE ADULT - SUBJECTIVE AND OBJECTIVE BOX
CHIEF COMPLAINT:    HISTORY OF PRESENT ILLNESS:  55 y/o male with PMHx of NICM (LVEF 25%, normal CTA of the coronary arteries 9/28/21), Afib (on eliquis), s/p GENE/DCCV 9/27/21, presented with orthopnea, lower abdominal swelling, b/l LE swelling for months, found to be in acute on chronic heart failure in the setting of Afib with RVR and COVID positive. In 2021 he was discharged on amiodarone load post cardioversion with plan to follow up with EP in one month. He finished medication in 10/2021 and lost to follow up. He saw cardiologist on 7/21/22 for SOB and was given ACE, beta blockers and Eliquis.     Allergies    No Known Allergies    Intolerances    	  MEDICATIONS:  apixaban 5 milliGRAM(s) Oral two times a day  aspirin enteric coated 81 milliGRAM(s) Oral daily  furosemide    Tablet 80 milliGRAM(s) Oral daily  metoprolol succinate ER 50 milliGRAM(s) Oral daily  sacubitril 49 mG/valsartan 51 mG 1 Tablet(s) Oral two times a day  spironolactone 25 milliGRAM(s) Oral daily  atorvastatin 40 milliGRAM(s) Oral at bedtime  chlorhexidine 4% Liquid 1 Application(s) Topical daily      PAST MEDICAL & SURGICAL HISTORY:  Congestive heart failure  Afib      PHYSICAL EXAM:  T(C): 36.9 (08-11-22 @ 12:31), Max: 36.9 (08-11-22 @ 12:31)  HR: 69 (08-11-22 @ 12:31) (69 - 97)  BP: 100/63 (08-11-22 @ 12:31) (100/63 - 133/96)  RR: 18 (08-11-22 @ 04:59) (18 - 18)  SpO2: 97% (08-11-22 @ 12:31) (96% - 97%)  Wt(kg): --  I&O's Summary    10 Aug 2022 07:01  -  11 Aug 2022 07:00  --------------------------------------------------------  IN: 840 mL / OUT: 0 mL / NET: 840 mL    11 Aug 2022 07:01  -  11 Aug 2022 16:31  --------------------------------------------------------  IN: 720 mL / OUT: 0 mL / NET: 720 mL      Physical exam: per primary/cardiology team        LABS:	 	    CBC Full  -  ( 11 Aug 2022 06:44 )  WBC Count : 8.25 K/uL  Hemoglobin : 16.1 g/dL  Hematocrit : 51.1 %  Platelet Count - Automated : 283 K/uL  Mean Cell Volume : 92.2 fl  Mean Cell Hemoglobin : 29.1 pg  Mean Cell Hemoglobin Concentration : 31.5 gm/dL      08-11    141  |  101  |  26<H>  ----------------------------<  98  4.6   |  27  |  1.08    Ca    9.5      11 Aug 2022 06:46    No TSH      TELEMETRY: Afib with 's, (up to 150-160's on 8/8/22)  	    ECG:  personally reviewed	    < from: TTE with Doppler (w/Cont) (08.10.22 @ 05:59) >  Dimensions:    Normal Values:  LA:     4.4    2.0 - 4.0 cm  Ao:     3.2    2.0 - 3.8 cm  SEPTUM: 1.1    0.6 - 1.2 cm  PWT:    1.1    0.6 - 1.1 cm  LVIDd:  5.3    3.0 -5.6 cm  LVIDs:  5.0    1.8 - 4.0 cm  Derived variables:  LVMI: 102 g/m2  RWT: 0.41  Fractional short: 6 %  EF (Visual Estimate): 25 %  Doppler Peak Velocity (m/sec): AoV=0.8  ------------------------------------------------------------------------  Observations:  Mitral Valve: Tethered mitral valve leaflets with normal  opening.  Aortic Valve/Aorta: Normal trileaflet aortic valve. Peak  transaortic valve gradient equals 3 mm Hg. Peak left  ventricular outflow tract gradient equals 1 mm Hg.  Aortic Root: 3.2 cm.  Left Atrium: Moderately dilated left atrium.  LA volume  index = 45 cc/m2.  Left Ventricle: Endocardial visualization enhanced with  intravenous injection of Ultrasonic Enhancing Agent  (Lumason).  Severe global left ventricular systolic  dysfunction. Normal left ventricular internal dimensions  and wall thickness. Unable to evaluate diastology.  Right Heart: Right atrium not well visualized. The right  ventricle is not well visualized; grossly reduced  right  ventricular systolicfunction. Tricuspid valve not well  visualized. Minimal tricuspid regurgitation. Pulmonic valve  not well visualized.  Pericardium/Pleura: No pericardial effusion seen.  Hemodynamic: Estimated right atrial pressure is 8 mm Hg.  Estimated right ventricular systolic pressure equals 27 mm  Hg, assuming right atrial pressure equals 8 mm Hg,  consistent with normal pulmonary pressures.  ------------------------------------------------------------------------  Conclusions:1. Endocardial visualizationenhanced with intravenous  injection of Ultrasonic Enhancing Agent (Lumason).  Severe  global left ventricular systolic dysfunction.  2. The right ventricle is not well visualized; grossly  reduced  right ventricular systolic function.  3. Estimated pulmonary artery systolic pressure equals 27  mm Hg, assuming right atrial pressure equals 8  mm Hg,  consistent with normal pulmonary pressures.  4. No pericardial effusion seen.    < end of copied text >    < from: CT Angio Heart and Coronaries w/ IV Cont (09.28.21 @ 15:10) >  IMPRESSION:    1.  Normal CTA of the coronary arteries.  2.  Bilateral pleural effusions and lower lobe passive atelectasis.  3.  The right ventricle may be enlarged.    < end of copied text >   CHIEF COMPLAINT:    HISTORY OF PRESENT ILLNESS:  55 y/o male with PMHx of NICM (LVEF 25%, normal CTA of the coronary arteries 9/28/21), Afib (on eliquis), s/p GENE/DCCV 9/27/21, presented with orthopnea, lower abdominal swelling, b/l LE swelling for months, found to be in acute on chronic heart failure in the setting of Afib with RVR and COVID positive. In 2021 he was discharged on amiodarone load post cardioversion with plan to follow up with EP in one month. He finished medication in 10/2021 and lost to follow up. He saw cardiologist on 7/21/22 for SOB and was given ACE, beta blockers and Eliquis.     Allergies No Known Allergies  	  MEDICATIONS:  apixaban 5 milliGRAM(s) Oral two times a day  aspirin enteric coated 81 milliGRAM(s) Oral daily  furosemide    Tablet 80 milliGRAM(s) Oral daily  metoprolol succinate ER 50 milliGRAM(s) Oral daily  sacubitril 49 mG/valsartan 51 mG 1 Tablet(s) Oral two times a day  spironolactone 25 milliGRAM(s) Oral daily  atorvastatin 40 milliGRAM(s) Oral at bedtime  chlorhexidine 4% Liquid 1 Application(s) Topical daily    PAST MEDICAL & SURGICAL HISTORY:  Congestive heart failure  Afib    PHYSICAL EXAM:  T(C): 36.9 (08-11-22 @ 12:31), Max: 36.9 (08-11-22 @ 12:31)  HR: 69 (08-11-22 @ 12:31) (69 - 97)  BP: 100/63 (08-11-22 @ 12:31) (100/63 - 133/96)  RR: 18 (08-11-22 @ 04:59) (18 - 18)  SpO2: 97% (08-11-22 @ 12:31) (96% - 97%)  Wt(kg): --  I&O's Summary    10 Aug 2022 07:01  -  11 Aug 2022 07:00  --------------------------------------------------------  IN: 840 mL / OUT: 0 mL / NET: 840 mL    11 Aug 2022 07:01  -  11 Aug 2022 16:31  --------------------------------------------------------  IN: 720 mL / OUT: 0 mL / NET: 720 mL    Physical exam: per primary/cardiology team    LABS:	 	    CBC Full  -  ( 11 Aug 2022 06:44 )  WBC Count : 8.25 K/uL  Hemoglobin : 16.1 g/dL  Hematocrit : 51.1 %  Platelet Count - Automated : 283 K/uL  Mean Cell Volume : 92.2 fl  Mean Cell Hemoglobin : 29.1 pg  Mean Cell Hemoglobin Concentration : 31.5 gm/dL    08-11    141  |  101  |  26<H>  ----------------------------<  98  4.6   |  27  |  1.08    Ca    9.5      11 Aug 2022 06:46    No TSH    TELEMETRY: Afib with 's, (up to 150-160's on 8/8/22)  	    	    < from: TTE with Doppler (w/Cont) (08.10.22 @ 05:59) >  Dimensions:    Normal Values:  LA:     4.4    2.0 - 4.0 cm  Ao:     3.2    2.0 - 3.8 cm  SEPTUM: 1.1    0.6 - 1.2 cm  PWT:    1.1    0.6 - 1.1 cm  LVIDd:  5.3    3.0 -5.6 cm  LVIDs:  5.0    1.8 - 4.0 cm  Derived variables:  LVMI: 102 g/m2  RWT: 0.41  Fractional short: 6 %  EF (Visual Estimate): 25 %  Doppler Peak Velocity (m/sec): AoV=0.8  ------------------------------------------------------------------------  Observations:  Mitral Valve: Tethered mitral valve leaflets with normal  opening.  Aortic Valve/Aorta: Normal trileaflet aortic valve. Peak  transaortic valve gradient equals 3 mm Hg. Peak left  ventricular outflow tract gradient equals 1 mm Hg.  Aortic Root: 3.2 cm.  Left Atrium: Moderately dilated left atrium.  LA volume  index = 45 cc/m2.  Left Ventricle: Endocardial visualization enhanced with  intravenous injection of Ultrasonic Enhancing Agent  (Lumason).  Severe global left ventricular systolic  dysfunction. Normal left ventricular internal dimensions  and wall thickness. Unable to evaluate diastology.  Right Heart: Right atrium not well visualized. The right  ventricle is not well visualized; grossly reduced  right  ventricular systolicfunction. Tricuspid valve not well  visualized. Minimal tricuspid regurgitation. Pulmonic valve  not well visualized.  Pericardium/Pleura: No pericardial effusion seen.  Hemodynamic: Estimated right atrial pressure is 8 mm Hg.  Estimated right ventricular systolic pressure equals 27 mm  Hg, assuming right atrial pressure equals 8 mm Hg,  consistent with normal pulmonary pressures.  ------------------------------------------------------------------------  Conclusions:1. Endocardial visualization enhanced with intravenous  injection of Ultrasonic Enhancing Agent (Lumason).  Severe  global left ventricular systolic dysfunction.  2. The right ventricle is not well visualized; grossly  reduced  right ventricular systolic function.  3. Estimated pulmonary artery systolic pressure equals 27  mm Hg, assuming right atrial pressure equals 8  mm Hg,  consistent with normal pulmonary pressures.  4. No pericardial effusion seen.    < end of copied text >    < from: CT Angio Heart and Coronaries w/ IV Cont (09.28.21 @ 15:10) > IMPRESSION: 1.  Normal CTA of the coronary arteries. 2.  Bilateral pleural effusions and lower lobe passive atelectasis. 3.  The right ventricle may be enlarged. < end of copied text >

## 2022-08-12 LAB
ANION GAP SERPL CALC-SCNC: 11 MMOL/L — SIGNIFICANT CHANGE UP (ref 5–17)
BUN SERPL-MCNC: 24 MG/DL — HIGH (ref 7–23)
CALCIUM SERPL-MCNC: 8.7 MG/DL — SIGNIFICANT CHANGE UP (ref 8.4–10.5)
CHLORIDE SERPL-SCNC: 103 MMOL/L — SIGNIFICANT CHANGE UP (ref 96–108)
CO2 SERPL-SCNC: 26 MMOL/L — SIGNIFICANT CHANGE UP (ref 22–31)
CREAT SERPL-MCNC: 1.03 MG/DL — SIGNIFICANT CHANGE UP (ref 0.5–1.3)
EGFR: 86 ML/MIN/1.73M2 — SIGNIFICANT CHANGE UP
GLUCOSE SERPL-MCNC: 102 MG/DL — HIGH (ref 70–99)
HCT VFR BLD CALC: 49.3 % — SIGNIFICANT CHANGE UP (ref 39–50)
HGB BLD-MCNC: 16 G/DL — SIGNIFICANT CHANGE UP (ref 13–17)
MCHC RBC-ENTMCNC: 29.5 PG — SIGNIFICANT CHANGE UP (ref 27–34)
MCHC RBC-ENTMCNC: 32.5 GM/DL — SIGNIFICANT CHANGE UP (ref 32–36)
MCV RBC AUTO: 91 FL — SIGNIFICANT CHANGE UP (ref 80–100)
NRBC # BLD: 0 /100 WBCS — SIGNIFICANT CHANGE UP (ref 0–0)
PLATELET # BLD AUTO: 255 K/UL — SIGNIFICANT CHANGE UP (ref 150–400)
POTASSIUM SERPL-MCNC: 4.3 MMOL/L — SIGNIFICANT CHANGE UP (ref 3.5–5.3)
POTASSIUM SERPL-SCNC: 4.3 MMOL/L — SIGNIFICANT CHANGE UP (ref 3.5–5.3)
RBC # BLD: 5.42 M/UL — SIGNIFICANT CHANGE UP (ref 4.2–5.8)
RBC # FLD: 13.5 % — SIGNIFICANT CHANGE UP (ref 10.3–14.5)
SODIUM SERPL-SCNC: 140 MMOL/L — SIGNIFICANT CHANGE UP (ref 135–145)
TSH SERPL-MCNC: 2.47 UIU/ML — SIGNIFICANT CHANGE UP (ref 0.27–4.2)
WBC # BLD: 8.57 K/UL — SIGNIFICANT CHANGE UP (ref 3.8–10.5)
WBC # FLD AUTO: 8.57 K/UL — SIGNIFICANT CHANGE UP (ref 3.8–10.5)

## 2022-08-12 PROCEDURE — 99233 SBSQ HOSP IP/OBS HIGH 50: CPT

## 2022-08-12 RX ADMIN — Medication 81 MILLIGRAM(S): at 11:37

## 2022-08-12 RX ADMIN — SACUBITRIL AND VALSARTAN 1 TABLET(S): 24; 26 TABLET, FILM COATED ORAL at 17:28

## 2022-08-12 RX ADMIN — CHLORHEXIDINE GLUCONATE 1 APPLICATION(S): 213 SOLUTION TOPICAL at 11:38

## 2022-08-12 RX ADMIN — Medication 50 MILLIGRAM(S): at 05:29

## 2022-08-12 RX ADMIN — SPIRONOLACTONE 25 MILLIGRAM(S): 25 TABLET, FILM COATED ORAL at 05:28

## 2022-08-12 RX ADMIN — ATORVASTATIN CALCIUM 40 MILLIGRAM(S): 80 TABLET, FILM COATED ORAL at 22:48

## 2022-08-12 RX ADMIN — APIXABAN 5 MILLIGRAM(S): 2.5 TABLET, FILM COATED ORAL at 05:28

## 2022-08-12 RX ADMIN — SACUBITRIL AND VALSARTAN 1 TABLET(S): 24; 26 TABLET, FILM COATED ORAL at 05:28

## 2022-08-12 RX ADMIN — Medication 80 MILLIGRAM(S): at 05:29

## 2022-08-12 RX ADMIN — APIXABAN 5 MILLIGRAM(S): 2.5 TABLET, FILM COATED ORAL at 17:28

## 2022-08-12 NOTE — PROGRESS NOTE ADULT - SUBJECTIVE AND OBJECTIVE BOX
********CARDIOLOGY PROGRESS NOTE********  HISTORY OF PRESENT ILLNESS:  HPI:   56Y M w/ PMH of A-fib w. dccv in 9/2021, HFrEF (EF: 25%; 7/21/22) presents to the ED with severe orthopnea last night along with dyspnea on exertion over 3 weeks. Last night, pt had to be propped up due to orthopnea until he was able to catch his breath. He was unable to sleep last night. Intermittently over the past 3 weeks, pt has noticed decreased exercise tolerance (able to walk 2 blocks) due to SOB. Pt was hospitalized in 9/21 and found to have A-fib w. dccv and discharged with home meds. Stated that he was given one month of medication after discharge but finished medication in 10/2021 and did not follow up until 7/2022 with Dr. Gustafson for SOB. Denies CP, palpitations, diaphoresis, headache, nausea, vomiting, changes in BM, blurry vision, or dysuria.  (04 Aug 2022 19:42)          Allergies    No Known Allergies    Intolerances    	    MEDICATIONS:  apixaban 5 milliGRAM(s) Oral two times a day  aspirin enteric coated 81 milliGRAM(s) Oral daily  furosemide    Tablet 80 milliGRAM(s) Oral daily  metoprolol succinate ER 50 milliGRAM(s) Oral daily  sacubitril 49 mG/valsartan 51 mG 1 Tablet(s) Oral two times a day  spironolactone 25 milliGRAM(s) Oral daily            atorvastatin 40 milliGRAM(s) Oral at bedtime    chlorhexidine 4% Liquid 1 Application(s) Topical daily      PAST MEDICAL & SURGICAL HISTORY:  Congestive heart failure      Afib          FAMILY HISTORY:      SOCIAL HISTORY:  unchanged    REVIEW OF SYSTEMS:  CONSTITUTIONAL: No fever, weight loss, or fatigue  EYES: No eye pain, visual disturbances, or discharge  ENMT:  No difficulty hearing, tinnitus, vertigo; No sinus or throat pain  NECK: No pain or stiffness  RESPIRATORY: No cough, wheezing, chills or hemoptysis; No Shortness of Breath  CARDIOVASCULAR: No chest pain, palpitations, passing out, dizziness, or leg swelling  GASTROINTESTINAL: No abdominal or epigastric pain. No nausea, vomiting, or hematemesis; No diarrhea or constipation. No melena or hematochezia.  GENITOURINARY: No dysuria, frequency, hematuria, or incontinence  NEUROLOGICAL: No headaches, memory loss, loss of strength, numbness, or tremors  SKIN: No itching, burning, rashes, or lesions   LYMPH Nodes: No enlarged glands  ENDOCRINE: No heat or cold intolerance; No hair loss  MUSCULOSKELETAL: No joint pain or swelling; No muscle, back, or extremity pain  PSYCHIATRIC: No depression, anxiety, mood swings, or difficulty sleeping  HEME/LYMPH: No easy bruising, or bleeding gums  ALLERY AND IMMUNOLOGIC: No hives or eczema	    [ ] All others negative	  [ ] Unable to obtain    PHYSICAL EXAM:  T(C): 36.6 (08-12-22 @ 05:20), Max: 36.9 (08-11-22 @ 12:31)  HR: 85 (08-12-22 @ 05:20) (69 - 87)  BP: 111/78 (08-12-22 @ 05:20) (100/63 - 111/78)  RR: 16 (08-12-22 @ 05:20) (16 - 17)  SpO2: 98% (08-12-22 @ 05:20) (96% - 98%)  Wt(kg): --  I&O's Summary    11 Aug 2022 07:01  -  12 Aug 2022 07:00  --------------------------------------------------------  IN: 1200 mL / OUT: 0 mL / NET: 1200 mL    12 Aug 2022 07:01  -  12 Aug 2022 12:22  --------------------------------------------------------  IN: 240 mL / OUT: 0 mL / NET: 240 mL        Appearance: Normal	  HEENT:   Normal oral mucosa, PERRL, EOMI	  Lymphatic: No lymphadenopathy  Cardiovascular: Normal S1 S2, No JVD, No murmurs, No edema  Respiratory: Lungs clear to auscultation	  Psychiatry: A & O x 3, Mood & affect appropriate  Gastrointestinal:  Soft, Non-tender, + BS	  Skin: No rashes, No ecchymoses, No cyanosis	  Neurologic: Non-focal  Extremities: Normal range of motion, No clubbing, cyanosis or edema  Vascular: Peripheral pulses palpable 2+ bilaterally      LABS:	 	    CBC Full  -  ( 12 Aug 2022 06:08 )  WBC Count : 8.57 K/uL  Hemoglobin : 16.0 g/dL  Hematocrit : 49.3 %  Platelet Count - Automated : 255 K/uL  Mean Cell Volume : 91.0 fl  Mean Cell Hemoglobin : 29.5 pg  Mean Cell Hemoglobin Concentration : 32.5 gm/dL  Auto Neutrophil # : x  Auto Lymphocyte # : x  Auto Monocyte # : x  Auto Eosinophil # : x  Auto Basophil # : x  Auto Neutrophil % : x  Auto Lymphocyte % : x  Auto Monocyte % : x  Auto Eosinophil % : x  Auto Basophil % : x    08-12    140  |  103  |  24<H>  ----------------------------<  102<H>  4.3   |  26  |  1.03  08-11    141  |  101  |  26<H>  ----------------------------<  98  4.6   |  27  |  1.08    Ca    8.7      12 Aug 2022 06:07  Ca    9.5      11 Aug 2022 06:46

## 2022-08-12 NOTE — PROGRESS NOTE ADULT - SUBJECTIVE AND OBJECTIVE BOX
24H hour events: No acute events overnight, Tele: Afib with VHR 's     MEDICATIONS:  apixaban 5 milliGRAM(s) Oral two times a day  aspirin enteric coated 81 milliGRAM(s) Oral daily  furosemide    Tablet 80 milliGRAM(s) Oral daily  metoprolol succinate ER 50 milliGRAM(s) Oral daily  sacubitril 49 mG/valsartan 51 mG 1 Tablet(s) Oral two times a day  spironolactone 25 milliGRAM(s) Oral daily  atorvastatin 40 milliGRAM(s) Oral at bedtime  chlorhexidine 4% Liquid 1 Application(s) Topical daily      REVIEW OF SYSTEMS:  Complete 10point ROS negative.    PHYSICAL EXAM:  T(C): 36.7 (08-12-22 @ 13:15), Max: 36.8 (08-11-22 @ 22:06)  HR: 73 (08-12-22 @ 13:15) (73 - 87)  BP: 112/60 (08-12-22 @ 13:15) (105/72 - 112/60)  RR: 16 (08-12-22 @ 13:15) (16 - 17)  SpO2: 97% (08-12-22 @ 13:15) (96% - 98%)  Wt(kg): --  I&O's Summary    11 Aug 2022 07:01  -  12 Aug 2022 07:00  --------------------------------------------------------  IN: 1200 mL / OUT: 0 mL / NET: 1200 mL    12 Aug 2022 07:01  -  12 Aug 2022 14:38  --------------------------------------------------------  IN: 240 mL / OUT: 0 mL / NET: 240 mL        Physical Exam: Per primary team      LABS:	 	    CBC Full  -  ( 12 Aug 2022 06:08 )  WBC Count : 8.57 K/uL  Hemoglobin : 16.0 g/dL  Hematocrit : 49.3 %  Platelet Count - Automated : 255 K/uL  Mean Cell Volume : 91.0 fl  Mean Cell Hemoglobin : 29.5 pg  Mean Cell Hemoglobin Concentration : 32.5 gm/dL      08-12    140  |  103  |  24<H>  ----------------------------<  102<H>  4.3   |  26  |  1.03  08-11    141  |  101  |  26<H>  ----------------------------<  98  4.6   |  27  |  1.08    Ca    8.7      12 Aug 2022 06:07  Ca    9.5      11 Aug 2022 06:46    Thyroid Stimulating Hormone, Serum in AM (08.12.22 @ 06:07)    Thyroid Stimulating Hormone, Serum: 2.47 uIU/mL      TELEMETRY: Afib with VHR 's 	      < from: TTE with Doppler (w/Cont) (08.10.22 @ 05:59) >  Dimensions:    Normal Values:  LA:     4.4    2.0 - 4.0 cm  Ao:     3.2    2.0 - 3.8 cm  SEPTUM: 1.1    0.6 - 1.2 cm  PWT:    1.1    0.6 - 1.1 cm  LVIDd:  5.3    3.0 -5.6 cm  LVIDs:  5.0    1.8 - 4.0 cm  Derived variables:  LVMI: 102 g/m2  RWT: 0.41  Fractional short: 6 %  EF (Visual Estimate): 25 %  Doppler Peak Velocity (m/sec): AoV=0.8  ------------------------------------------------------------------------  Observations:  Mitral Valve: Tethered mitral valve leaflets with normal  opening.  Aortic Valve/Aorta: Normal trileaflet aortic valve. Peak  transaortic valve gradient equals 3 mm Hg. Peak left  ventricular outflow tract gradient equals 1 mm Hg.  Aortic Root: 3.2 cm.  Left Atrium: Moderately dilated left atrium.  LA volume  index = 45 cc/m2.  Left Ventricle: Endocardial visualization enhanced with  intravenous injection of Ultrasonic Enhancing Agent  (Lumason).  Severe global left ventricular systolic  dysfunction. Normal left ventricular internal dimensions  and wall thickness. Unable to evaluate diastology.  Right Heart: Right atrium not well visualized. The right  ventricle is not well visualized; grossly reduced  right  ventricular systolicfunction. Tricuspid valve not well  visualized. Minimal tricuspid regurgitation. Pulmonic valve  not well visualized.  Pericardium/Pleura: No pericardial effusion seen.  Hemodynamic: Estimated right atrial pressure is 8 mm Hg.  Estimated right ventricular systolic pressure equals 27 mm  Hg, assuming right atrial pressure equals 8 mm Hg,  consistent with normal pulmonary pressures.  ------------------------------------------------------------------------  Conclusions:1. Endocardial visualizationenhanced with intravenous  injection of Ultrasonic Enhancing Agent (Lumason).  Severe  global left ventricular systolic dysfunction.  2. The right ventricle is not well visualized; grossly  reduced  right ventricular systolic function.  3. Estimated pulmonary artery systolic pressure equals 27  mm Hg, assuming right atrial pressure equals 8  mm Hg,  consistent with normal pulmonary pressures.  4. No pericardial effusion seen.  *** Compared with echocardiogram of 7/21/2022, no  significant changesnoted.    < end of copied text >     24H hour events: No acute events overnight, Tele: Afib with VHR 's     MEDICATIONS:  apixaban 5 milliGRAM(s) Oral two times a day  aspirin enteric coated 81 milliGRAM(s) Oral daily  furosemide    Tablet 80 milliGRAM(s) Oral daily  metoprolol succinate ER 50 milliGRAM(s) Oral daily  sacubitril 49 mG/valsartan 51 mG 1 Tablet(s) Oral two times a day  spironolactone 25 milliGRAM(s) Oral daily  atorvastatin 40 milliGRAM(s) Oral at bedtime  chlorhexidine 4% Liquid 1 Application(s) Topical daily    REVIEW OF SYSTEMS: Complete 10point ROS negative.    PHYSICAL EXAM:  T(C): 36.7 (08-12-22 @ 13:15), Max: 36.8 (08-11-22 @ 22:06)  HR: 73 (08-12-22 @ 13:15) (73 - 87)  BP: 112/60 (08-12-22 @ 13:15) (105/72 - 112/60)  RR: 16 (08-12-22 @ 13:15) (16 - 17)  SpO2: 97% (08-12-22 @ 13:15) (96% - 98%)  I&O's Summary    11 Aug 2022 07:01  -  12 Aug 2022 07:00  --------------------------------------------------------  IN: 1200 mL / OUT: 0 mL / NET: 1200 mL    12 Aug 2022 07:01  -  12 Aug 2022 14:38  --------------------------------------------------------  IN: 240 mL / OUT: 0 mL / NET: 240 mL    Physical Exam: Per primary team    LABS:	 	    CBC Full  -  ( 12 Aug 2022 06:08 )  WBC Count : 8.57 K/uL  Hemoglobin : 16.0 g/dL  Hematocrit : 49.3 %  Platelet Count - Automated : 255 K/uL  Mean Cell Volume : 91.0 fl  Mean Cell Hemoglobin : 29.5 pg  Mean Cell Hemoglobin Concentration : 32.5 gm/dL    08-12    140  |  103  |  24<H>  ----------------------------<  102<H>  4.3   |  26  |  1.03  08-11    141  |  101  |  26<H>  ----------------------------<  98  4.6   |  27  |  1.08    Ca    8.7      12 Aug 2022 06:07  Ca    9.5      11 Aug 2022 06:46    Thyroid Stimulating Hormone, Serum in AM (08.12.22 @ 06:07)    Thyroid Stimulating Hormone, Serum: 2.47 uIU/mL    TELEMETRY: Afib with VHR 's 	      < from: TTE with Doppler (w/Cont) (08.10.22 @ 05:59) >  Dimensions:    Normal Values:  LA:     4.4    2.0 - 4.0 cm  Ao:     3.2    2.0 - 3.8 cm  SEPTUM: 1.1    0.6 - 1.2 cm  PWT:    1.1    0.6 - 1.1 cm  LVIDd:  5.3    3.0 -5.6 cm  LVIDs:  5.0    1.8 - 4.0 cm  Derived variables:  LVMI: 102 g/m2  RWT: 0.41  Fractional short: 6 %  EF (Visual Estimate): 25 %  Doppler Peak Velocity (m/sec): AoV=0.8  ------------------------------------------------------------------------  Observations: Mitral Valve: Tethered mitral valve leaflets with normal opening. Aortic Valve/Aorta: Normal trileaflet aortic valve. Peak transaortic valve gradient equals 3 mm Hg. Peak left ventricular outflow tract gradient equals 1 mm Hg. Aortic Root: 3.2 cm. Left Atrium: Moderately dilated left atrium.  LA volume index = 45 cc/m2. Left Ventricle: Endocardial visualization enhanced with intravenous injection of Ultrasonic Enhancing Agent (Lumason).  Severe global left ventricular systolic dysfunction. Normal left ventricular internal dimensions and wall thickness. Unable to evaluate diastology. Right Heart: Right atrium not well visualized. The right ventricle is not well visualized; grossly reduced  right ventricular systolic function. Tricuspid valve not well visualized. Minimal tricuspid regurgitation. Pulmonic valve not well visualized. Pericardium/Pleura: No pericardial effusion seen. Hemodynamic: Estimated right atrial pressure is 8 mm Hg. Estimated right ventricular systolic pressure equals 27 mm Hg, assuming right atrial pressure equals 8 mm Hg, consistent with normal pulmonary pressures. ------------------------------------------------------------------------ Conclusions:1. Endocardial visualization enhanced with intravenous injection of Ultrasonic Enhancing Agent (Lumason).  Severe global left ventricular systolic dysfunction. 2. The right ventricle is not well visualized; grossly reduced  right ventricular systolic function. 3. Estimated pulmonary artery systolic pressure equals 27 mm Hg, assuming right atrial pressure equals 8  mm Hg, consistent with normal pulmonary pressures. 4. No pericardial effusion seen. *** Compared with echocardiogram of 7/21/2022, no significant changes noted.  < end of copied text >

## 2022-08-12 NOTE — PROGRESS NOTE ADULT - ASSESSMENT
56 year-old man with nonischemic cardiomyopathy with severe LV systolic dysfunction not on outpatient medications with acute decompensated heart failure in the setting of rapid atrial fibrillation.  -Volume status improved with medical management.  -Patient for GENE/DCCV. Patient discussed with electrophysiology. Plan for amiodarone post-cardioversion with plan for eventual ablation.  -Plan discussed with the patient.

## 2022-08-12 NOTE — PROGRESS NOTE ADULT - ASSESSMENT
57 y/o male with PMHx of NICM (LVEF 25%, normal CTA of the coronary arteries 9/28/21), Afib (on eliquis), s/p GENE/DCCV 9/27/21, presented with acute on chronic heart failure in the setting of Afib with RVR and COVID positive. EPS consulted for possible GENE/DCCV when out of COVID isolation.    Recurrent Afib with RVR in the setting of acute on chronic heart failure and COVID-19 PNA  -Unknown duration of Afib   -He was prescribed with amiodarone load in 9/2021 with plan to follow up with EP in one month and was lost to follow up  -c/w eluqis 5mg BID  -c/w BBlocker for rate control, uptitrate for better rate control  -c/w GDMT  -c/w telemetry  -Will plan for GENE/DCCV when out of COVID isolation Monday (8/15/22)     ELIAZAR Mariano NP-C  [phone]   55 y/o male with PMHx of NICM (LVEF 25%, normal CTA of the coronary arteries 9/28/21), persistent AF (on eliquis), s/p GENE/DCCV 9/27/21, presented with acute on chronic heart failure in the setting of Afib with RVR and COVID positive. EPS consulted for possible GENE/DCCV when out of COVID isolation.    Recurrent Afib with RVR in the setting of acute on chronic heart failure and COVID-19 PNA  -Unknown duration of Afib   -He was prescribed with amiodarone load in 9/2021 with plan to follow up with EP in one month and was lost to follow up  -c/w eluqis 5mg BID  -c/w BBlocker for rate control, uptitrate for better rate control  -c/w GDMT  -c/w telemetry  -Will plan for GENE/DCCV when out of COVID isolation Monday (8/15/22)     ELIAZAR Mariano NP-C  [phone]

## 2022-08-12 NOTE — PROGRESS NOTE ADULT - SUBJECTIVE AND OBJECTIVE BOX
Patient is a 56y old  Male who presents with a chief complaint of SOB (12 Aug 2022 12:22)      INTERVAL HPI/OVERNIGHT EVENTS:  T(C): 36.8 (08-12-22 @ 21:23), Max: 36.8 (08-12-22 @ 21:23)  HR: 86 (08-12-22 @ 21:23) (73 - 86)  BP: 133/80 (08-12-22 @ 21:23) (111/78 - 133/80)  RR: 16 (08-12-22 @ 21:23) (16 - 16)  SpO2: 97% (08-12-22 @ 21:23) (97% - 98%)  Wt(kg): --  I&O's Summary    11 Aug 2022 07:01  -  12 Aug 2022 07:00  --------------------------------------------------------  IN: 1200 mL / OUT: 0 mL / NET: 1200 mL    12 Aug 2022 07:01  -  13 Aug 2022 01:26  --------------------------------------------------------  IN: 600 mL / OUT: 0 mL / NET: 600 mL        PAST MEDICAL & SURGICAL HISTORY:  Congestive heart failure      Afib          SOCIAL HISTORY  Alcohol:  Tobacco:  Illicit substance use:    FAMILY HISTORY:    REVIEW OF SYSTEMS:  CONSTITUTIONAL: No fever, weight loss, or fatigue  EYES: No eye pain, visual disturbances, or discharge  ENMT:  No difficulty hearing, tinnitus, vertigo; No sinus or throat pain  NECK: No pain or stiffness  RESPIRATORY: No cough, wheezing, chills or hemoptysis; No shortness of breath  CARDIOVASCULAR: No chest pain, palpitations, dizziness, or leg swelling  GASTROINTESTINAL: No abdominal or epigastric pain. No nausea, vomiting, or hematemesis; No diarrhea or constipation. No melena or hematochezia.  GENITOURINARY: No dysuria, frequency, hematuria, or incontinence  NEUROLOGICAL: No headaches, memory loss, loss of strength, numbness, or tremors  SKIN: No itching, burning, rashes, or lesions   LYMPH NODES: No enlarged glands  ENDOCRINE: No heat or cold intolerance; No hair loss  MUSCULOSKELETAL: No joint pain or swelling; No muscle, back, or extremity pain  PSYCHIATRIC: No depression, anxiety, mood swings, or difficulty sleeping  HEME/LYMPH: No easy bruising, or bleeding gums  ALLERY AND IMMUNOLOGIC: No hives or eczema    RADIOLOGY & ADDITIONAL TESTS:    Imaging Personally Reviewed:  [ ] YES  [ ] NO    Consultant(s) Notes Reviewed:  [ ] YES  [ ] NO    PHYSICAL EXAM:  GENERAL: NAD, well-groomed, well-developed  HEAD:  Atraumatic, Normocephalic  EYES: EOMI, PERRLA, conjunctiva and sclera clear  ENMT: No tonsillar erythema, exudates, or enlargement; Moist mucous membranes, Good dentition, No lesions  NECK: Supple, No JVD, Normal thyroid  NERVOUS SYSTEM:  Alert & Oriented X3, Good concentration; Motor Strength 5/5 B/L upper and lower extremities; DTRs 2+ intact and symmetric  CHEST/LUNG: Clear to percussion bilaterally; No rales, rhonchi, wheezing, or rubs  HEART: Regular rate and rhythm; No murmurs, rubs, or gallops  ABDOMEN: Soft, Nontender, Nondistended; Bowel sounds present  EXTREMITIES:  2+ Peripheral Pulses, No clubbing, cyanosis, or edema  LYMPH: No lymphadenopathy noted  SKIN: No rashes or lesions    LABS:                        16.0   8.57  )-----------( 255      ( 12 Aug 2022 06:08 )             49.3     08-12    140  |  103  |  24<H>  ----------------------------<  102<H>  4.3   |  26  |  1.03    Ca    8.7      12 Aug 2022 06:07          CAPILLARY BLOOD GLUCOSE                MEDICATIONS  (STANDING):  apixaban 5 milliGRAM(s) Oral two times a day  aspirin enteric coated 81 milliGRAM(s) Oral daily  atorvastatin 40 milliGRAM(s) Oral at bedtime  chlorhexidine 4% Liquid 1 Application(s) Topical daily  furosemide    Tablet 80 milliGRAM(s) Oral daily  metoprolol succinate ER 50 milliGRAM(s) Oral daily  sacubitril 49 mG/valsartan 51 mG 1 Tablet(s) Oral two times a day  spironolactone 25 milliGRAM(s) Oral daily    MEDICATIONS  (PRN):      Care Discussed with Consultants/Other Providers [ ] YES  [ ] NO Psych/Behavioral

## 2022-08-13 PROCEDURE — 99232 SBSQ HOSP IP/OBS MODERATE 35: CPT

## 2022-08-13 RX ADMIN — APIXABAN 5 MILLIGRAM(S): 2.5 TABLET, FILM COATED ORAL at 18:10

## 2022-08-13 RX ADMIN — Medication 50 MILLIGRAM(S): at 07:04

## 2022-08-13 RX ADMIN — APIXABAN 5 MILLIGRAM(S): 2.5 TABLET, FILM COATED ORAL at 07:04

## 2022-08-13 RX ADMIN — SACUBITRIL AND VALSARTAN 1 TABLET(S): 24; 26 TABLET, FILM COATED ORAL at 07:04

## 2022-08-13 RX ADMIN — Medication 80 MILLIGRAM(S): at 07:03

## 2022-08-13 RX ADMIN — Medication 81 MILLIGRAM(S): at 11:24

## 2022-08-13 RX ADMIN — SACUBITRIL AND VALSARTAN 1 TABLET(S): 24; 26 TABLET, FILM COATED ORAL at 18:10

## 2022-08-13 RX ADMIN — SPIRONOLACTONE 25 MILLIGRAM(S): 25 TABLET, FILM COATED ORAL at 07:04

## 2022-08-13 RX ADMIN — ATORVASTATIN CALCIUM 40 MILLIGRAM(S): 80 TABLET, FILM COATED ORAL at 21:31

## 2022-08-13 RX ADMIN — CHLORHEXIDINE GLUCONATE 1 APPLICATION(S): 213 SOLUTION TOPICAL at 09:30

## 2022-08-13 NOTE — PROGRESS NOTE ADULT - NS ATTEND OPT1 GEN_ALL_CORE
I independently performed the documented:
I attest my time as attending is greater than 50% of the total combined time spent on qualifying patient care activities by the PA/NP and attending.
Family

## 2022-08-13 NOTE — PROGRESS NOTE ADULT - ASSESSMENT
55 y/o male with PMHx of NICM (LVEF 25%, normal CTA of the coronary arteries 9/28/21), persistent AF (on eliquis), s/p GENE/DCCV 9/27/21, presented with acute on chronic heart failure in the setting of Afib with RVR and COVID positive. EPS consulted for possible GENE/DCCV when out of COVID isolation.    Recurrent Afib with RVR of unknown duration in the setting of acute on chronic heart failure and COVID-19 PNA    - Continue to monitor on telemetry  - Keep K>4 and Mg>2  - Previously prescribed with amiodarone load in 9/2021 with plan to follow up with EP in one month and was lost to follow up  - c/w eliquis 5mg BID  - c/w BBlocker for rate control, uptitrate for better rate control  - c/w GDMT  - Plan for GENE/DCCV when out of COVID isolation Monday (8/15/22). Likely followed by short course of Amiodarone.  - Make NPO after midnight Sunday 8/14.     LYDIA Woodard-C  #[phone]

## 2022-08-13 NOTE — PROGRESS NOTE ADULT - SUBJECTIVE AND OBJECTIVE BOX
Patient is a 56y old  Male who presents with a chief complaint of SOB (13 Aug 2022 12:23)      INTERVAL HPI/OVERNIGHT EVENTS:  T(C): 37.1 (08-13-22 @ 21:34), Max: 37.1 (08-13-22 @ 21:34)  HR: 76 (08-13-22 @ 21:34) (74 - 88)  BP: 138/87 (08-13-22 @ 21:34) (112/86 - 138/87)  RR: 18 (08-13-22 @ 21:34) (18 - 18)  SpO2: 95% (08-13-22 @ 21:34) (95% - 98%)  Wt(kg): --  I&O's Summary    12 Aug 2022 07:01  -  13 Aug 2022 07:00  --------------------------------------------------------  IN: 700 mL / OUT: 0 mL / NET: 700 mL    13 Aug 2022 07:01  -  13 Aug 2022 22:46  --------------------------------------------------------  IN: 897 mL / OUT: 0 mL / NET: 897 mL        PAST MEDICAL & SURGICAL HISTORY:  Congestive heart failure      Afib          SOCIAL HISTORY  Alcohol:  Tobacco:  Illicit substance use:    FAMILY HISTORY:    REVIEW OF SYSTEMS:  CONSTITUTIONAL: No fever, weight loss, or fatigue  EYES: No eye pain, visual disturbances, or discharge  ENMT:  No difficulty hearing, tinnitus, vertigo; No sinus or throat pain  NECK: No pain or stiffness  RESPIRATORY: No cough, wheezing, chills or hemoptysis; No shortness of breath  CARDIOVASCULAR: No chest pain, palpitations, dizziness, or leg swelling  GASTROINTESTINAL: No abdominal or epigastric pain. No nausea, vomiting, or hematemesis; No diarrhea or constipation. No melena or hematochezia.  GENITOURINARY: No dysuria, frequency, hematuria, or incontinence  NEUROLOGICAL: No headaches, memory loss, loss of strength, numbness, or tremors  SKIN: No itching, burning, rashes, or lesions   LYMPH NODES: No enlarged glands  ENDOCRINE: No heat or cold intolerance; No hair loss  MUSCULOSKELETAL: No joint pain or swelling; No muscle, back, or extremity pain  PSYCHIATRIC: No depression, anxiety, mood swings, or difficulty sleeping  HEME/LYMPH: No easy bruising, or bleeding gums  ALLERY AND IMMUNOLOGIC: No hives or eczema    RADIOLOGY & ADDITIONAL TESTS:    Imaging Personally Reviewed:  [ ] YES  [ ] NO    Consultant(s) Notes Reviewed:  [ ] YES  [ ] NO    PHYSICAL EXAM:  GENERAL: NAD, well-groomed, well-developed  HEAD:  Atraumatic, Normocephalic  EYES: EOMI, PERRLA, conjunctiva and sclera clear  ENMT: No tonsillar erythema, exudates, or enlargement; Moist mucous membranes, Good dentition, No lesions  NECK: Supple, No JVD, Normal thyroid  NERVOUS SYSTEM:  Alert & Oriented X3, Good concentration; Motor Strength 5/5 B/L upper and lower extremities; DTRs 2+ intact and symmetric  CHEST/LUNG: Clear to percussion bilaterally; No rales, rhonchi, wheezing, or rubs  HEART: Regular rate and rhythm; No murmurs, rubs, or gallops  ABDOMEN: Soft, Nontender, Nondistended; Bowel sounds present  EXTREMITIES:  2+ Peripheral Pulses, No clubbing, cyanosis, or edema  LYMPH: No lymphadenopathy noted  SKIN: No rashes or lesions    LABS:                        16.0   8.57  )-----------( 255      ( 12 Aug 2022 06:08 )             49.3     08-12    140  |  103  |  24<H>  ----------------------------<  102<H>  4.3   |  26  |  1.03    Ca    8.7      12 Aug 2022 06:07          CAPILLARY BLOOD GLUCOSE                MEDICATIONS  (STANDING):  apixaban 5 milliGRAM(s) Oral two times a day  aspirin enteric coated 81 milliGRAM(s) Oral daily  atorvastatin 40 milliGRAM(s) Oral at bedtime  chlorhexidine 4% Liquid 1 Application(s) Topical daily  furosemide    Tablet 80 milliGRAM(s) Oral daily  metoprolol succinate ER 50 milliGRAM(s) Oral daily  sacubitril 49 mG/valsartan 51 mG 1 Tablet(s) Oral two times a day  spironolactone 25 milliGRAM(s) Oral daily    MEDICATIONS  (PRN):      Care Discussed with Consultants/Other Providers [ ] YES  [ ] NO

## 2022-08-13 NOTE — PROGRESS NOTE ADULT - NS ATTEND AMEND GEN_ALL_CORE FT
55 y/o male with PMHx of NICM (LVEF 25%, normal CTA of the coronary arteries 9/28/21), persistent AF (on Eliquis), s/p GENE/DCCV 9/27/21, presented with acute on chronic heart failure in the setting of Afib with RVR and COVID positive. EPS consulted for possible GENE/DCCV when out of COVID isolation. Previously prescribed with amiodarone load in 9/2021 with plan to follow up with EP in one month and was lost to follow up.  c/w Eliquis 5mg BID, BBlocker for rate control, uptitrate for better rate control. Plan for GENE/DCCV when out of COVID isolation Monday (8/15/22). Likely followed by short course of Amiodarone. Make NPO after midnight Sunday 8/14.
Patient seen at bedside on 2 DSU. Persistent AF with cardiomyopathy - would benefit from a rhythm control strategy. I spoke with the patient regarding the plan for a GENE/DCCV on Monday. Will likely plan for a short course of Amio following in an effort to maintain sinus while he establishes care in the outpatient setting. I introduced the idea of a catheter ablation. I discussed the importance of outpatient follow-up, the risks of AF, the side effects of Amiodarone and the risks of his cardiomyopathy. Case also discussed with Dr. Dempsey.    CARLOS Ford

## 2022-08-13 NOTE — PROGRESS NOTE ADULT - SUBJECTIVE AND OBJECTIVE BOX
24H hour events: No acute overnight events    MEDICATIONS:  apixaban 5 milliGRAM(s) Oral two times a day  aspirin enteric coated 81 milliGRAM(s) Oral daily  furosemide    Tablet 80 milliGRAM(s) Oral daily  metoprolol succinate ER 50 milliGRAM(s) Oral daily  sacubitril 49 mG/valsartan 51 mG 1 Tablet(s) Oral two times a day  spironolactone 25 milliGRAM(s) Oral daily  atorvastatin 40 milliGRAM(s) Oral at bedtime  chlorhexidine 4% Liquid 1 Application(s) Topical daily      REVIEW OF SYSTEMS:  See HPI, otherwise ROS negative.    PHYSICAL EXAM:  T(C): 36.8 (08-13-22 @ 11:23), Max: 36.8 (08-12-22 @ 21:23)  HR: 88 (08-13-22 @ 11:23) (73 - 88)  BP: 113/72 (08-13-22 @ 11:23) (112/60 - 133/80)  RR: 18 (08-13-22 @ 11:23) (16 - 18)  SpO2: 95% (08-13-22 @ 11:23) (95% - 98%)  Wt(kg): --  I&O's Summary    12 Aug 2022 07:01  -  13 Aug 2022 07:00  --------------------------------------------------------  IN: 700 mL / OUT: 0 mL / NET: 700 mL    13 Aug 2022 07:01  -  13 Aug 2022 12:23  --------------------------------------------------------  IN: 177 mL / OUT: 0 mL / NET: 177 mL        Physical Exam per primary team    LABS:	 	    CBC Full  -  ( 12 Aug 2022 06:08 )  WBC Count : 8.57 K/uL  Hemoglobin : 16.0 g/dL  Hematocrit : 49.3 %  Platelet Count - Automated : 255 K/uL  Mean Cell Volume : 91.0 fl  Mean Cell Hemoglobin : 29.5 pg  Mean Cell Hemoglobin Concentration : 32.5 gm/dL  Auto Neutrophil # : x  Auto Lymphocyte # : x  Auto Monocyte # : x  Auto Eosinophil # : x  Auto Basophil # : x  Auto Neutrophil % : x  Auto Lymphocyte % : x  Auto Monocyte % : x  Auto Eosinophil % : x  Auto Basophil % : x    08-12    140  |  103  |  24<H>  ----------------------------<  102<H>  4.3   |  26  |  1.03    Ca    8.7      12 Aug 2022 06:07    TELEMETRY: AF/AFL  BPM

## 2022-08-14 LAB
ANION GAP SERPL CALC-SCNC: 11 MMOL/L — SIGNIFICANT CHANGE UP (ref 5–17)
BASOPHILS # BLD AUTO: 0.05 K/UL — SIGNIFICANT CHANGE UP (ref 0–0.2)
BASOPHILS NFR BLD AUTO: 0.6 % — SIGNIFICANT CHANGE UP (ref 0–2)
BUN SERPL-MCNC: 21 MG/DL — SIGNIFICANT CHANGE UP (ref 7–23)
CALCIUM SERPL-MCNC: 8.6 MG/DL — SIGNIFICANT CHANGE UP (ref 8.4–10.5)
CHLORIDE SERPL-SCNC: 103 MMOL/L — SIGNIFICANT CHANGE UP (ref 96–108)
CO2 SERPL-SCNC: 25 MMOL/L — SIGNIFICANT CHANGE UP (ref 22–31)
CREAT SERPL-MCNC: 0.87 MG/DL — SIGNIFICANT CHANGE UP (ref 0.5–1.3)
EGFR: 102 ML/MIN/1.73M2 — SIGNIFICANT CHANGE UP
EOSINOPHIL # BLD AUTO: 0.19 K/UL — SIGNIFICANT CHANGE UP (ref 0–0.5)
EOSINOPHIL NFR BLD AUTO: 2.2 % — SIGNIFICANT CHANGE UP (ref 0–6)
GLUCOSE SERPL-MCNC: 105 MG/DL — HIGH (ref 70–99)
HCT VFR BLD CALC: 47.6 % — SIGNIFICANT CHANGE UP (ref 39–50)
HGB BLD-MCNC: 15.4 G/DL — SIGNIFICANT CHANGE UP (ref 13–17)
IMM GRANULOCYTES NFR BLD AUTO: 0.7 % — SIGNIFICANT CHANGE UP (ref 0–1.5)
LYMPHOCYTES # BLD AUTO: 2.41 K/UL — SIGNIFICANT CHANGE UP (ref 1–3.3)
LYMPHOCYTES # BLD AUTO: 28.2 % — SIGNIFICANT CHANGE UP (ref 13–44)
MAGNESIUM SERPL-MCNC: 2.2 MG/DL — SIGNIFICANT CHANGE UP (ref 1.6–2.6)
MCHC RBC-ENTMCNC: 29.8 PG — SIGNIFICANT CHANGE UP (ref 27–34)
MCHC RBC-ENTMCNC: 32.4 GM/DL — SIGNIFICANT CHANGE UP (ref 32–36)
MCV RBC AUTO: 92.2 FL — SIGNIFICANT CHANGE UP (ref 80–100)
MONOCYTES # BLD AUTO: 0.6 K/UL — SIGNIFICANT CHANGE UP (ref 0–0.9)
MONOCYTES NFR BLD AUTO: 7 % — SIGNIFICANT CHANGE UP (ref 2–14)
NEUTROPHILS # BLD AUTO: 5.25 K/UL — SIGNIFICANT CHANGE UP (ref 1.8–7.4)
NEUTROPHILS NFR BLD AUTO: 61.3 % — SIGNIFICANT CHANGE UP (ref 43–77)
NRBC # BLD: 0 /100 WBCS — SIGNIFICANT CHANGE UP (ref 0–0)
PHOSPHATE SERPL-MCNC: 4.5 MG/DL — SIGNIFICANT CHANGE UP (ref 2.5–4.5)
PLATELET # BLD AUTO: 253 K/UL — SIGNIFICANT CHANGE UP (ref 150–400)
POTASSIUM SERPL-MCNC: 4.4 MMOL/L — SIGNIFICANT CHANGE UP (ref 3.5–5.3)
POTASSIUM SERPL-SCNC: 4.4 MMOL/L — SIGNIFICANT CHANGE UP (ref 3.5–5.3)
RBC # BLD: 5.16 M/UL — SIGNIFICANT CHANGE UP (ref 4.2–5.8)
RBC # FLD: 13.3 % — SIGNIFICANT CHANGE UP (ref 10.3–14.5)
SODIUM SERPL-SCNC: 139 MMOL/L — SIGNIFICANT CHANGE UP (ref 135–145)
WBC # BLD: 8.56 K/UL — SIGNIFICANT CHANGE UP (ref 3.8–10.5)
WBC # FLD AUTO: 8.56 K/UL — SIGNIFICANT CHANGE UP (ref 3.8–10.5)

## 2022-08-14 PROCEDURE — 99232 SBSQ HOSP IP/OBS MODERATE 35: CPT

## 2022-08-14 RX ADMIN — APIXABAN 5 MILLIGRAM(S): 2.5 TABLET, FILM COATED ORAL at 17:10

## 2022-08-14 RX ADMIN — SACUBITRIL AND VALSARTAN 1 TABLET(S): 24; 26 TABLET, FILM COATED ORAL at 05:50

## 2022-08-14 RX ADMIN — Medication 80 MILLIGRAM(S): at 05:50

## 2022-08-14 RX ADMIN — SACUBITRIL AND VALSARTAN 1 TABLET(S): 24; 26 TABLET, FILM COATED ORAL at 17:10

## 2022-08-14 RX ADMIN — ATORVASTATIN CALCIUM 40 MILLIGRAM(S): 80 TABLET, FILM COATED ORAL at 21:10

## 2022-08-14 RX ADMIN — Medication 50 MILLIGRAM(S): at 05:50

## 2022-08-14 RX ADMIN — Medication 81 MILLIGRAM(S): at 11:15

## 2022-08-14 RX ADMIN — SPIRONOLACTONE 25 MILLIGRAM(S): 25 TABLET, FILM COATED ORAL at 05:50

## 2022-08-14 RX ADMIN — APIXABAN 5 MILLIGRAM(S): 2.5 TABLET, FILM COATED ORAL at 05:50

## 2022-08-14 NOTE — PROGRESS NOTE ADULT - SUBJECTIVE AND OBJECTIVE BOX
Past Medical History    PAST MEDICAL & SURGICAL HISTORY:  Congestive heart failure      Afib          MEDICATIONS:  apixaban 5 milliGRAM(s) Oral two times a day  aspirin enteric coated 81 milliGRAM(s) Oral daily  furosemide    Tablet 80 milliGRAM(s) Oral daily  metoprolol succinate ER 50 milliGRAM(s) Oral daily  sacubitril 49 mG/valsartan 51 mG 1 Tablet(s) Oral two times a day  spironolactone 25 milliGRAM(s) Oral daily            atorvastatin 40 milliGRAM(s) Oral at bedtime    chlorhexidine 4% Liquid 1 Application(s) Topical daily        Allergies    No Known Allergies    Intolerances        FAMILY HISTORY:      SOCIAL HISTORY    Marital Status:   Occupation:   Lives with:     SUBSTANCE USE  Tobacco Usage:  ( ) None ( ) never smoked   ( ) former smoker  ( ) current smoker; Packs per day:   Alcohol Usage: ( ) none  ( ) occasional ( ) 2-3 times a week ( ) daily; Last drink:   Recreational drugs ( ) None      VITAL SIGNS  T(C): 36.7 (08-14-22 @ 11:21), Max: 37.1 (08-13-22 @ 21:34)  HR: 80 (08-14-22 @ 11:21) (70 - 80)  BP: 111/70 (08-14-22 @ 11:21) (105/72 - 138/87)  RR: 18 (08-14-22 @ 11:21) (17 - 18)  SpO2: 92% (08-14-22 @ 11:21) (92% - 96%)  Wt(kg): --            I&O's Summary    13 Aug 2022 07:01  -  14 Aug 2022 07:00  --------------------------------------------------------  IN: 897 mL / OUT: 0 mL / NET: 897 mL    14 Aug 2022 07:01  -  14 Aug 2022 12:34  --------------------------------------------------------  IN: 240 mL / OUT: 0 mL / NET: 240 mL        LABORATORY VALUES	 	                          15.4   8.56  )-----------( 253      ( 14 Aug 2022 05:39 )             47.6       08-14    139  |  103  |  21  ----------------------------<  105<H>  4.4   |  25  |  0.87    Ca    8.6      14 Aug 2022 05:39  Phos  4.5     08-14  Mg     2.2     08-14            CARDIAC MARKERS:          Serum Pro-Brain Natriuretic Peptide: 2105 pg/mL (08-04 @ 17:06)          Thyroid Stimulating Hormone, Serum: 2.47 uIU/mL (08-12 @ 06:07)        CAPILLARY BLOOD GLUCOSE              TELEMETRY: AF 80-90; 20 beats WCT at 150 bpm; AF  last night	    ECG:  	  RADIOLOGY:  OTHER: 	    PREVIOUS DIAGNOSTIC TESTING:    [ ] Echocardiogram:  [ ] Catheterization:  [ ] Stress Test:

## 2022-08-14 NOTE — PROGRESS NOTE ADULT - ASSESSMENT
A/P     # Acute decompensated systolic heart failure :  -pt. didn't follow with his cardio as out pt. and ran out of meds   decrease lasix to 80 mg IV q daily change to PO   cardio following   breathing much better     Ch afib :   -on eliquis   rate controlled   plan is for cardioversion     # CHF :  c/w toprol XL   d/c losartan and started on entresto   aldactone 25 mg added       HLD :  -c/w statin     Noncompliance with meds : counseling done     dispo: plan for cardioversion in am

## 2022-08-14 NOTE — PROVIDER CONTACT NOTE (OTHER) - ASSESSMENT
pt a & o x 4. denies cp or SOB. pt asymptomatic and sleeping. See vital signs in flowsheet. patient to go for GENE/DCCV on monday

## 2022-08-14 NOTE — PROGRESS NOTE ADULT - SUBJECTIVE AND OBJECTIVE BOX
Patient is a 56y old  Male who presents with a chief complaint of SOB (14 Aug 2022 12:34)      INTERVAL HPI/OVERNIGHT EVENTS:  T(C): 37.1 (08-14-22 @ 20:20), Max: 37.1 (08-14-22 @ 20:20)  HR: 69 (08-14-22 @ 20:20) (69 - 98)  BP: 121/75 (08-14-22 @ 20:20) (105/72 - 136/50)  RR: 18 (08-14-22 @ 20:20) (17 - 18)  SpO2: 96% (08-14-22 @ 20:20) (92% - 96%)  Wt(kg): --  I&O's Summary    13 Aug 2022 07:01  -  14 Aug 2022 07:00  --------------------------------------------------------  IN: 897 mL / OUT: 0 mL / NET: 897 mL    14 Aug 2022 07:01  -  14 Aug 2022 23:17  --------------------------------------------------------  IN: 240 mL / OUT: 0 mL / NET: 240 mL        PAST MEDICAL & SURGICAL HISTORY:  Congestive heart failure      Afib          SOCIAL HISTORY  Alcohol:  Tobacco:  Illicit substance use:    FAMILY HISTORY:    REVIEW OF SYSTEMS:  CONSTITUTIONAL: No fever, weight loss, or fatigue  EYES: No eye pain, visual disturbances, or discharge  ENMT:  No difficulty hearing, tinnitus, vertigo; No sinus or throat pain  NECK: No pain or stiffness  RESPIRATORY: No cough, wheezing, chills or hemoptysis; No shortness of breath  CARDIOVASCULAR: No chest pain, palpitations, dizziness, or leg swelling  GASTROINTESTINAL: No abdominal or epigastric pain. No nausea, vomiting, or hematemesis; No diarrhea or constipation. No melena or hematochezia.  GENITOURINARY: No dysuria, frequency, hematuria, or incontinence  NEUROLOGICAL: No headaches, memory loss, loss of strength, numbness, or tremors  SKIN: No itching, burning, rashes, or lesions   LYMPH NODES: No enlarged glands  ENDOCRINE: No heat or cold intolerance; No hair loss  MUSCULOSKELETAL: No joint pain or swelling; No muscle, back, or extremity pain  PSYCHIATRIC: No depression, anxiety, mood swings, or difficulty sleeping  HEME/LYMPH: No easy bruising, or bleeding gums  ALLERY AND IMMUNOLOGIC: No hives or eczema    RADIOLOGY & ADDITIONAL TESTS:    Imaging Personally Reviewed:  [ ] YES  [ ] NO    Consultant(s) Notes Reviewed:  [ ] YES  [ ] NO    PHYSICAL EXAM:  GENERAL: NAD, well-groomed, well-developed  HEAD:  Atraumatic, Normocephalic  EYES: EOMI, PERRLA, conjunctiva and sclera clear  ENMT: No tonsillar erythema, exudates, or enlargement; Moist mucous membranes, Good dentition, No lesions  NECK: Supple, No JVD, Normal thyroid  NERVOUS SYSTEM:  Alert & Oriented X3, Good concentration; Motor Strength 5/5 B/L upper and lower extremities; DTRs 2+ intact and symmetric  CHEST/LUNG: Clear to percussion bilaterally; No rales, rhonchi, wheezing, or rubs  HEART: Regular rate and rhythm; No murmurs, rubs, or gallops  ABDOMEN: Soft, Nontender, Nondistended; Bowel sounds present  EXTREMITIES:  2+ Peripheral Pulses, No clubbing, cyanosis, or edema  LYMPH: No lymphadenopathy noted  SKIN: No rashes or lesions    LABS:                        15.4   8.56  )-----------( 253      ( 14 Aug 2022 05:39 )             47.6     08-14    139  |  103  |  21  ----------------------------<  105<H>  4.4   |  25  |  0.87    Ca    8.6      14 Aug 2022 05:39  Phos  4.5     08-14  Mg     2.2     08-14          CAPILLARY BLOOD GLUCOSE                MEDICATIONS  (STANDING):  apixaban 5 milliGRAM(s) Oral two times a day  aspirin enteric coated 81 milliGRAM(s) Oral daily  atorvastatin 40 milliGRAM(s) Oral at bedtime  chlorhexidine 4% Liquid 1 Application(s) Topical daily  furosemide    Tablet 80 milliGRAM(s) Oral daily  metoprolol succinate ER 50 milliGRAM(s) Oral daily  sacubitril 49 mG/valsartan 51 mG 1 Tablet(s) Oral two times a day  spironolactone 25 milliGRAM(s) Oral daily    MEDICATIONS  (PRN):      Care Discussed with Consultants/Other Providers [ ] YES  [ ] NO Patient is a 56y old  Male who presents with a chief complaint of SOB (14 Aug 2022 12:34)      INTERVAL HPI/OVERNIGHT EVENTS: denies any c/o   T(C): 37.1 (08-14-22 @ 20:20), Max: 37.1 (08-14-22 @ 20:20)  HR: 69 (08-14-22 @ 20:20) (69 - 98)  BP: 121/75 (08-14-22 @ 20:20) (105/72 - 136/50)  RR: 18 (08-14-22 @ 20:20) (17 - 18)  SpO2: 96% (08-14-22 @ 20:20) (92% - 96%)  Wt(kg): --  I&O's Summary    13 Aug 2022 07:01  -  14 Aug 2022 07:00  --------------------------------------------------------  IN: 897 mL / OUT: 0 mL / NET: 897 mL    14 Aug 2022 07:01  -  14 Aug 2022 23:17  --------------------------------------------------------  IN: 240 mL / OUT: 0 mL / NET: 240 mL        PAST MEDICAL & SURGICAL HISTORY:  Congestive heart failure      Afib          SOCIAL HISTORY  Alcohol:  Tobacco:  Illicit substance use:    FAMILY HISTORY:    REVIEW OF SYSTEMS:  CONSTITUTIONAL: No fever, weight loss, or fatigue  EYES: No eye pain, visual disturbances, or discharge  ENMT:  No difficulty hearing, tinnitus, vertigo; No sinus or throat pain  NECK: No pain or stiffness  RESPIRATORY: No cough, wheezing, chills or hemoptysis; No shortness of breath  CARDIOVASCULAR: No chest pain, palpitations, dizziness, or leg swelling  GASTROINTESTINAL: No abdominal or epigastric pain. No nausea, vomiting, or hematemesis; No diarrhea or constipation. No melena or hematochezia.  GENITOURINARY: No dysuria, frequency, hematuria, or incontinence  NEUROLOGICAL: No headaches, memory loss, loss of strength, numbness, or tremors  SKIN: No itching, burning, rashes, or lesions   LYMPH NODES: No enlarged glands  ENDOCRINE: No heat or cold intolerance; No hair loss  MUSCULOSKELETAL: No joint pain or swelling; No muscle, back, or extremity pain  PSYCHIATRIC: No depression, anxiety, mood swings, or difficulty sleeping  HEME/LYMPH: No easy bruising, or bleeding gums  ALLERY AND IMMUNOLOGIC: No hives or eczema    RADIOLOGY & ADDITIONAL TESTS:    Imaging Personally Reviewed:  [ ] YES  [ ] NO    Consultant(s) Notes Reviewed:  [ ] YES  [ ] NO    PHYSICAL EXAM:  GENERAL: NAD, well-groomed, well-developed  HEAD:  Atraumatic, Normocephalic  EYES: EOMI, PERRLA, conjunctiva and sclera clear  ENMT: No tonsillar erythema, exudates, or enlargement; Moist mucous membranes, Good dentition, No lesions  NECK: Supple, No JVD, Normal thyroid  NERVOUS SYSTEM:  Alert & Oriented X3, Good concentration; Motor Strength 5/5 B/L upper and lower extremities; DTRs 2+ intact and symmetric  CHEST/LUNG: Clear to percussion bilaterally; No rales, rhonchi, wheezing, or rubs  HEART: Regular rate and rhythm; No murmurs, rubs, or gallops  ABDOMEN: Soft, Nontender, Nondistended; Bowel sounds present  EXTREMITIES:  2+ Peripheral Pulses, No clubbing, cyanosis, or edema  LYMPH: No lymphadenopathy noted  SKIN: No rashes or lesions    LABS:                        15.4   8.56  )-----------( 253      ( 14 Aug 2022 05:39 )             47.6     08-14    139  |  103  |  21  ----------------------------<  105<H>  4.4   |  25  |  0.87    Ca    8.6      14 Aug 2022 05:39  Phos  4.5     08-14  Mg     2.2     08-14          CAPILLARY BLOOD GLUCOSE                MEDICATIONS  (STANDING):  apixaban 5 milliGRAM(s) Oral two times a day  aspirin enteric coated 81 milliGRAM(s) Oral daily  atorvastatin 40 milliGRAM(s) Oral at bedtime  chlorhexidine 4% Liquid 1 Application(s) Topical daily  furosemide    Tablet 80 milliGRAM(s) Oral daily  metoprolol succinate ER 50 milliGRAM(s) Oral daily  sacubitril 49 mG/valsartan 51 mG 1 Tablet(s) Oral two times a day  spironolactone 25 milliGRAM(s) Oral daily    MEDICATIONS  (PRN):      Care Discussed with Consultants/Other Providers [ ] YES  [ ] NO

## 2022-08-14 NOTE — PROGRESS NOTE ADULT - ASSESSMENT
55 y/o male with PMHx of NICM (LVEF 25%, normal CTA of the coronary arteries 9/28/21), persistent AF (on Eliquis), s/p GENE/DCCV 9/27/21, presented with acute on chronic heart failure in the setting of Afib with RVR and COVID positive. EPS consulted for possible GENE/DCCV when out of COVID isolation.  Eliquis 5mg BID, BBlocker for rate control, uptitrate for better rate control; c/w GDMT; GENE/DCCV when out of COVID isolation Monday (8/15/22). Likely followed by short course of Amiodarone.  NPO after midnight.

## 2022-08-15 LAB
ANION GAP SERPL CALC-SCNC: 15 MMOL/L — SIGNIFICANT CHANGE UP (ref 5–17)
APTT BLD: 28.4 SEC — SIGNIFICANT CHANGE UP (ref 27.5–35.5)
BUN SERPL-MCNC: 25 MG/DL — HIGH (ref 7–23)
CALCIUM SERPL-MCNC: 9.1 MG/DL — SIGNIFICANT CHANGE UP (ref 8.4–10.5)
CHLORIDE SERPL-SCNC: 101 MMOL/L — SIGNIFICANT CHANGE UP (ref 96–108)
CO2 SERPL-SCNC: 24 MMOL/L — SIGNIFICANT CHANGE UP (ref 22–31)
CREAT SERPL-MCNC: 0.99 MG/DL — SIGNIFICANT CHANGE UP (ref 0.5–1.3)
EGFR: 90 ML/MIN/1.73M2 — SIGNIFICANT CHANGE UP
GLUCOSE SERPL-MCNC: 97 MG/DL — SIGNIFICANT CHANGE UP (ref 70–99)
HCT VFR BLD CALC: 49.2 % — SIGNIFICANT CHANGE UP (ref 39–50)
HGB BLD-MCNC: 15.9 G/DL — SIGNIFICANT CHANGE UP (ref 13–17)
INR BLD: 1.13 RATIO — SIGNIFICANT CHANGE UP (ref 0.88–1.16)
MAGNESIUM SERPL-MCNC: 2.2 MG/DL — SIGNIFICANT CHANGE UP (ref 1.6–2.6)
MCHC RBC-ENTMCNC: 29.4 PG — SIGNIFICANT CHANGE UP (ref 27–34)
MCHC RBC-ENTMCNC: 32.3 GM/DL — SIGNIFICANT CHANGE UP (ref 32–36)
MCV RBC AUTO: 90.9 FL — SIGNIFICANT CHANGE UP (ref 80–100)
NRBC # BLD: 0 /100 WBCS — SIGNIFICANT CHANGE UP (ref 0–0)
PLATELET # BLD AUTO: 250 K/UL — SIGNIFICANT CHANGE UP (ref 150–400)
POTASSIUM SERPL-MCNC: 4.7 MMOL/L — SIGNIFICANT CHANGE UP (ref 3.5–5.3)
POTASSIUM SERPL-SCNC: 4.7 MMOL/L — SIGNIFICANT CHANGE UP (ref 3.5–5.3)
PROTHROM AB SERPL-ACNC: 13 SEC — SIGNIFICANT CHANGE UP (ref 10.5–13.4)
RBC # BLD: 5.41 M/UL — SIGNIFICANT CHANGE UP (ref 4.2–5.8)
RBC # FLD: 13.3 % — SIGNIFICANT CHANGE UP (ref 10.3–14.5)
SODIUM SERPL-SCNC: 140 MMOL/L — SIGNIFICANT CHANGE UP (ref 135–145)
WBC # BLD: 8.04 K/UL — SIGNIFICANT CHANGE UP (ref 3.8–10.5)
WBC # FLD AUTO: 8.04 K/UL — SIGNIFICANT CHANGE UP (ref 3.8–10.5)

## 2022-08-15 PROCEDURE — 92960 CARDIOVERSION ELECTRIC EXT: CPT

## 2022-08-15 PROCEDURE — 93010 ELECTROCARDIOGRAM REPORT: CPT

## 2022-08-15 PROCEDURE — 93325 DOPPLER ECHO COLOR FLOW MAPG: CPT | Mod: 26

## 2022-08-15 PROCEDURE — 93312 ECHO TRANSESOPHAGEAL: CPT | Mod: 26

## 2022-08-15 PROCEDURE — 93320 DOPPLER ECHO COMPLETE: CPT | Mod: 26

## 2022-08-15 RX ORDER — ATORVASTATIN CALCIUM 80 MG/1
1 TABLET, FILM COATED ORAL
Qty: 30 | Refills: 0
Start: 2022-08-15 | End: 2022-09-13

## 2022-08-15 RX ORDER — SPIRONOLACTONE 25 MG/1
1 TABLET, FILM COATED ORAL
Qty: 30 | Refills: 0
Start: 2022-08-15 | End: 2022-09-13

## 2022-08-15 RX ORDER — ASPIRIN/CALCIUM CARB/MAGNESIUM 324 MG
1 TABLET ORAL
Qty: 0 | Refills: 0 | DISCHARGE
Start: 2022-08-15

## 2022-08-15 RX ORDER — AMIODARONE HYDROCHLORIDE 400 MG/1
TABLET ORAL
Refills: 0 | Status: DISCONTINUED | OUTPATIENT
Start: 2022-08-15 | End: 2022-08-16

## 2022-08-15 RX ORDER — FUROSEMIDE 40 MG
1 TABLET ORAL
Qty: 30 | Refills: 0
Start: 2022-08-15 | End: 2022-09-13

## 2022-08-15 RX ORDER — AMIODARONE HYDROCHLORIDE 400 MG/1
400 TABLET ORAL
Refills: 0 | Status: DISCONTINUED | OUTPATIENT
Start: 2022-08-15 | End: 2022-08-16

## 2022-08-15 RX ADMIN — Medication 80 MILLIGRAM(S): at 05:01

## 2022-08-15 RX ADMIN — SPIRONOLACTONE 25 MILLIGRAM(S): 25 TABLET, FILM COATED ORAL at 05:01

## 2022-08-15 RX ADMIN — Medication 50 MILLIGRAM(S): at 05:01

## 2022-08-15 RX ADMIN — SACUBITRIL AND VALSARTAN 1 TABLET(S): 24; 26 TABLET, FILM COATED ORAL at 17:08

## 2022-08-15 RX ADMIN — Medication 81 MILLIGRAM(S): at 11:29

## 2022-08-15 RX ADMIN — SACUBITRIL AND VALSARTAN 1 TABLET(S): 24; 26 TABLET, FILM COATED ORAL at 05:01

## 2022-08-15 RX ADMIN — ATORVASTATIN CALCIUM 40 MILLIGRAM(S): 80 TABLET, FILM COATED ORAL at 21:27

## 2022-08-15 RX ADMIN — APIXABAN 5 MILLIGRAM(S): 2.5 TABLET, FILM COATED ORAL at 17:12

## 2022-08-15 RX ADMIN — APIXABAN 5 MILLIGRAM(S): 2.5 TABLET, FILM COATED ORAL at 05:01

## 2022-08-15 NOTE — PROGRESS NOTE ADULT - ASSESSMENT
A/P     # Acute decompensated systolic heart failure :  -pt. didn't follow with his cardio as out pt. and ran out of meds   lasix 80 mg PO  cardio following   breathing much better     Ch afib :   -on eliquis   rate controlled   s/p cardioversion today    # CHF :  c/w toprol XL   d/c losartan and started on entresto   aldactone 25 mg added       HLD :  -c/w statin     Noncompliance with meds : counseling done     dispo: if cleared by cardio , plan for d/c home in am   f/u with me # [phone] in office in 1 week

## 2022-08-15 NOTE — PROGRESS NOTE ADULT - SUBJECTIVE AND OBJECTIVE BOX
24H hour events: No acute overnight events    MEDICATIONS:  apixaban 5 milliGRAM(s) Oral two times a day  aspirin enteric coated 81 milliGRAM(s) Oral daily  furosemide    Tablet 80 milliGRAM(s) Oral daily  metoprolol succinate ER 50 milliGRAM(s) Oral daily  sacubitril 49 mG/valsartan 51 mG 1 Tablet(s) Oral two times a day  spironolactone 25 milliGRAM(s) Oral daily  atorvastatin 40 milliGRAM(s) Oral at bedtime  chlorhexidine 4% Liquid 1 Application(s) Topical daily      REVIEW OF SYSTEMS:  See HPI, otherwise ROS negative.    PHYSICAL EXAM:  T(C): 36.7 (08-15-22 @ 04:57), Max: 37.1 (08-14-22 @ 20:20)  HR: 81 (08-15-22 @ 04:57) (69 - 98)  BP: 116/72 (08-15-22 @ 04:57) (111/70 - 136/50)  RR: 16 (08-15-22 @ 04:57) (16 - 18)  SpO2: 95% (08-15-22 @ 04:57) (92% - 96%)  Wt(kg): --  I&O's Summary    14 Aug 2022 07:01  -  15 Aug 2022 07:00  --------------------------------------------------------  IN: 240 mL / OUT: 0 mL / NET: 240 mL    15 Aug 2022 07:01  -  15 Aug 2022 09:56  --------------------------------------------------------  IN: 0 mL / OUT: 0 mL / NET: 0 mL        Appearance: Alert. NAD	  Cardiovascular: Irregular  Respiratory: CTA B/L	  Extremities: No edema BLE  Vascular: Peripheral pulses palpable 2+ bilaterally      LABS:	 	    CBC Full  -  ( 15 Aug 2022 06:18 )  WBC Count : 8.04 K/uL  Hemoglobin : 15.9 g/dL  Hematocrit : 49.2 %  Platelet Count - Automated : 250 K/uL  Mean Cell Volume : 90.9 fl  Mean Cell Hemoglobin : 29.4 pg  Mean Cell Hemoglobin Concentration : 32.3 gm/dL  Auto Neutrophil # : x  Auto Lymphocyte # : x  Auto Monocyte # : x  Auto Eosinophil # : x  Auto Basophil # : x  Auto Neutrophil % : x  Auto Lymphocyte % : x  Auto Monocyte % : x  Auto Eosinophil % : x  Auto Basophil % : x    08-15    140  |  101  |  25<H>  ----------------------------<  97  4.7   |  24  |  0.99  08-14    139  |  103  |  21  ----------------------------<  105<H>  4.4   |  25  |  0.87    Ca    9.1      15 Aug 2022 06:17  Ca    8.6      14 Aug 2022 05:39  Phos  4.5     08-14  Mg     2.2     08-15  Mg     2.2     08-14    TELEMETRY: AF  BPM

## 2022-08-15 NOTE — PROGRESS NOTE ADULT - SUBJECTIVE AND OBJECTIVE BOX
Patient is a 56y old  Male who presents with a chief complaint of SOB (15 Aug 2022 09:55)      INTERVAL HPI/OVERNIGHT EVENTS:  T(C): 36.7 (08-15-22 @ 20:30), Max: 37.2 (08-15-22 @ 11:30)  HR: 89 (08-15-22 @ 20:30) (79 - 92)  BP: 108/79 (08-15-22 @ 20:30) (99/65 - 134/83)  RR: 18 (08-15-22 @ 20:30) (16 - 18)  SpO2: 95% (08-15-22 @ 20:30) (94% - 97%)  Wt(kg): --  I&O's Summary    14 Aug 2022 07:01  -  15 Aug 2022 07:00  --------------------------------------------------------  IN: 240 mL / OUT: 0 mL / NET: 240 mL    15 Aug 2022 07:01  -  15 Aug 2022 22:57  --------------------------------------------------------  IN: 0 mL / OUT: 0 mL / NET: 0 mL        PAST MEDICAL & SURGICAL HISTORY:  Congestive heart failure      Afib          SOCIAL HISTORY  Alcohol:  Tobacco:  Illicit substance use:    FAMILY HISTORY:    REVIEW OF SYSTEMS:  CONSTITUTIONAL: No fever, weight loss, or fatigue  EYES: No eye pain, visual disturbances, or discharge  ENMT:  No difficulty hearing, tinnitus, vertigo; No sinus or throat pain  NECK: No pain or stiffness  RESPIRATORY: No cough, wheezing, chills or hemoptysis; No shortness of breath  CARDIOVASCULAR: No chest pain, palpitations, dizziness, or leg swelling  GASTROINTESTINAL: No abdominal or epigastric pain. No nausea, vomiting, or hematemesis; No diarrhea or constipation. No melena or hematochezia.  GENITOURINARY: No dysuria, frequency, hematuria, or incontinence  NEUROLOGICAL: No headaches, memory loss, loss of strength, numbness, or tremors  SKIN: No itching, burning, rashes, or lesions   LYMPH NODES: No enlarged glands  ENDOCRINE: No heat or cold intolerance; No hair loss  MUSCULOSKELETAL: No joint pain or swelling; No muscle, back, or extremity pain  PSYCHIATRIC: No depression, anxiety, mood swings, or difficulty sleeping  HEME/LYMPH: No easy bruising, or bleeding gums  ALLERY AND IMMUNOLOGIC: No hives or eczema    RADIOLOGY & ADDITIONAL TESTS:    Imaging Personally Reviewed:  [ ] YES  [ ] NO    Consultant(s) Notes Reviewed:  [ ] YES  [ ] NO    PHYSICAL EXAM:  GENERAL: NAD, well-groomed, well-developed  HEAD:  Atraumatic, Normocephalic  EYES: EOMI, PERRLA, conjunctiva and sclera clear  ENMT: No tonsillar erythema, exudates, or enlargement; Moist mucous membranes, Good dentition, No lesions  NECK: Supple, No JVD, Normal thyroid  NERVOUS SYSTEM:  Alert & Oriented X3, Good concentration; Motor Strength 5/5 B/L upper and lower extremities; DTRs 2+ intact and symmetric  CHEST/LUNG: Clear to percussion bilaterally; No rales, rhonchi, wheezing, or rubs  HEART: Regular rate and rhythm; No murmurs, rubs, or gallops  ABDOMEN: Soft, Nontender, Nondistended; Bowel sounds present  EXTREMITIES:  2+ Peripheral Pulses, No clubbing, cyanosis, or edema  LYMPH: No lymphadenopathy noted  SKIN: No rashes or lesions    LABS:                        15.9   8.04  )-----------( 250      ( 15 Aug 2022 06:18 )             49.2     08-15    140  |  101  |  25<H>  ----------------------------<  97  4.7   |  24  |  0.99    Ca    9.1      15 Aug 2022 06:17  Phos  4.5     08-14  Mg     2.2     08-15      PT/INR - ( 15 Aug 2022 06:18 )   PT: 13.0 sec;   INR: 1.13 ratio         PTT - ( 15 Aug 2022 06:18 )  PTT:28.4 sec    CAPILLARY BLOOD GLUCOSE                MEDICATIONS  (STANDING):  aMIOdarone    Tablet   Oral   aMIOdarone    Tablet 400 milliGRAM(s) Oral two times a day  apixaban 5 milliGRAM(s) Oral two times a day  aspirin enteric coated 81 milliGRAM(s) Oral daily  atorvastatin 40 milliGRAM(s) Oral at bedtime  chlorhexidine 4% Liquid 1 Application(s) Topical daily  furosemide    Tablet 80 milliGRAM(s) Oral daily  metoprolol succinate ER 50 milliGRAM(s) Oral daily  sacubitril 49 mG/valsartan 51 mG 1 Tablet(s) Oral two times a day  spironolactone 25 milliGRAM(s) Oral daily    MEDICATIONS  (PRN):      Care Discussed with Consultants/Other Providers [ ] YES  [ ] NO Patient is a 56y old  Male who presents with a chief complaint of SOB (15 Aug 2022 09:55)      INTERVAL HPI/OVERNIGHT EVENTS: s/p cardioversion   T(C): 36.7 (08-15-22 @ 20:30), Max: 37.2 (08-15-22 @ 11:30)  HR: 89 (08-15-22 @ 20:30) (79 - 92)  BP: 108/79 (08-15-22 @ 20:30) (99/65 - 134/83)  RR: 18 (08-15-22 @ 20:30) (16 - 18)  SpO2: 95% (08-15-22 @ 20:30) (94% - 97%)  Wt(kg): --  I&O's Summary    14 Aug 2022 07:01  -  15 Aug 2022 07:00  --------------------------------------------------------  IN: 240 mL / OUT: 0 mL / NET: 240 mL    15 Aug 2022 07:01  -  15 Aug 2022 22:57  --------------------------------------------------------  IN: 0 mL / OUT: 0 mL / NET: 0 mL        PAST MEDICAL & SURGICAL HISTORY:  Congestive heart failure      Afib          SOCIAL HISTORY  Alcohol:  Tobacco:  Illicit substance use:    FAMILY HISTORY:    REVIEW OF SYSTEMS:  CONSTITUTIONAL: No fever, weight loss, or fatigue  EYES: No eye pain, visual disturbances, or discharge  ENMT:  No difficulty hearing, tinnitus, vertigo; No sinus or throat pain  NECK: No pain or stiffness  RESPIRATORY: No cough, wheezing, chills or hemoptysis; No shortness of breath  CARDIOVASCULAR: No chest pain, palpitations, dizziness, or leg swelling  GASTROINTESTINAL: No abdominal or epigastric pain. No nausea, vomiting, or hematemesis; No diarrhea or constipation. No melena or hematochezia.  GENITOURINARY: No dysuria, frequency, hematuria, or incontinence  NEUROLOGICAL: No headaches, memory loss, loss of strength, numbness, or tremors  SKIN: No itching, burning, rashes, or lesions   LYMPH NODES: No enlarged glands  ENDOCRINE: No heat or cold intolerance; No hair loss  MUSCULOSKELETAL: No joint pain or swelling; No muscle, back, or extremity pain  PSYCHIATRIC: No depression, anxiety, mood swings, or difficulty sleeping  HEME/LYMPH: No easy bruising, or bleeding gums  ALLERY AND IMMUNOLOGIC: No hives or eczema    RADIOLOGY & ADDITIONAL TESTS:    Imaging Personally Reviewed:  [ ] YES  [ ] NO    Consultant(s) Notes Reviewed:  [ ] YES  [ ] NO    PHYSICAL EXAM:  GENERAL: NAD, well-groomed, well-developed  HEAD:  Atraumatic, Normocephalic  EYES: EOMI, PERRLA, conjunctiva and sclera clear  ENMT: No tonsillar erythema, exudates, or enlargement; Moist mucous membranes, Good dentition, No lesions  NECK: Supple, No JVD, Normal thyroid  NERVOUS SYSTEM:  Alert & Oriented X3, Good concentration; Motor Strength 5/5 B/L upper and lower extremities; DTRs 2+ intact and symmetric  CHEST/LUNG: Clear to percussion bilaterally; No rales, rhonchi, wheezing, or rubs  HEART: Regular rate and rhythm; No murmurs, rubs, or gallops  ABDOMEN: Soft, Nontender, Nondistended; Bowel sounds present  EXTREMITIES:  2+ Peripheral Pulses, No clubbing, cyanosis, or edema  LYMPH: No lymphadenopathy noted  SKIN: No rashes or lesions    LABS:                        15.9   8.04  )-----------( 250      ( 15 Aug 2022 06:18 )             49.2     08-15    140  |  101  |  25<H>  ----------------------------<  97  4.7   |  24  |  0.99    Ca    9.1      15 Aug 2022 06:17  Phos  4.5     08-14  Mg     2.2     08-15      PT/INR - ( 15 Aug 2022 06:18 )   PT: 13.0 sec;   INR: 1.13 ratio         PTT - ( 15 Aug 2022 06:18 )  PTT:28.4 sec    CAPILLARY BLOOD GLUCOSE                MEDICATIONS  (STANDING):  aMIOdarone    Tablet   Oral   aMIOdarone    Tablet 400 milliGRAM(s) Oral two times a day  apixaban 5 milliGRAM(s) Oral two times a day  aspirin enteric coated 81 milliGRAM(s) Oral daily  atorvastatin 40 milliGRAM(s) Oral at bedtime  chlorhexidine 4% Liquid 1 Application(s) Topical daily  furosemide    Tablet 80 milliGRAM(s) Oral daily  metoprolol succinate ER 50 milliGRAM(s) Oral daily  sacubitril 49 mG/valsartan 51 mG 1 Tablet(s) Oral two times a day  spironolactone 25 milliGRAM(s) Oral daily    MEDICATIONS  (PRN):      Care Discussed with Consultants/Other Providers [ ] YES  [ ] NO

## 2022-08-15 NOTE — PROGRESS NOTE ADULT - ASSESSMENT
57 y/o male with PMHx of NICM (LVEF 25%, normal CTA of the coronary arteries 9/28/21), persistent AF (on eliquis), s/p GENE/DCCV 9/27/21, presented with acute on chronic heart failure in the setting of Afib with RVR and COVID positive. EPS consulted for possible GENE/DCCV when out of COVID isolation.    Recurrent Afib with RVR of unknown duration in the setting of acute on chronic heart failure and COVID-19 PNA    - Continue to monitor on telemetry  - Keep K>4 and Mg>2  - C/w eliquis 5mg BID  - C/w BBlocker for rate control, uptitrate for better rate control  - c/w GDMT  - Maintain NPO for GENE/DCCV today. Likely followed by short course of Amiodarone.    NICHOLE WoodardC  #[id number]   55 y/o male with PMHx of NICM (LVEF 25%, normal CTA of the coronary arteries 9/28/21), persistent AF (on eliquis), s/p GENE/DCCV 9/27/21, presented with acute on chronic heart failure in the setting of Afib with RVR and COVID positive. EPS consulted for possible GENE/DCCV when out of COVID isolation.    Recurrent Afib with RVR of unknown duration in the setting of acute on chronic heart failure and COVID-19 PNA    - Continue to monitor on telemetry  - Keep K>4 and Mg>2  - C/w eliquis 5mg BID  - C/w BBlocker for rate control, uptitrate for better rate control  - c/w GDMT  - Maintain NPO for GENE/DCCV today. Likely followed by short course of Amiodarone.    ADDENDUM:  1. S/p successful DCCV, remains in SR  2. Start Amiodarone load as follows: Amiodarone 400mg BID x1 week followed by Amiodarone 200mg BID x1 week followed by Amiodarone 200mg QD    NICHOLE WoodardC  #[id number]

## 2022-08-16 ENCOUNTER — TRANSCRIPTION ENCOUNTER (OUTPATIENT)
Age: 57
End: 2022-08-16

## 2022-08-16 VITALS — WEIGHT: 220.9 LBS

## 2022-08-16 PROBLEM — I50.9 HEART FAILURE, UNSPECIFIED: Chronic | Status: ACTIVE | Noted: 2022-08-04

## 2022-08-16 PROBLEM — I48.91 UNSPECIFIED ATRIAL FIBRILLATION: Chronic | Status: ACTIVE | Noted: 2022-08-04

## 2022-08-16 LAB
ANION GAP SERPL CALC-SCNC: 12 MMOL/L — SIGNIFICANT CHANGE UP (ref 5–17)
BUN SERPL-MCNC: 31 MG/DL — HIGH (ref 7–23)
CALCIUM SERPL-MCNC: 9.2 MG/DL — SIGNIFICANT CHANGE UP (ref 8.4–10.5)
CHLORIDE SERPL-SCNC: 99 MMOL/L — SIGNIFICANT CHANGE UP (ref 96–108)
CO2 SERPL-SCNC: 26 MMOL/L — SIGNIFICANT CHANGE UP (ref 22–31)
CREAT SERPL-MCNC: 1.16 MG/DL — SIGNIFICANT CHANGE UP (ref 0.5–1.3)
EGFR: 74 ML/MIN/1.73M2 — SIGNIFICANT CHANGE UP
GLUCOSE SERPL-MCNC: 96 MG/DL — SIGNIFICANT CHANGE UP (ref 70–99)
MAGNESIUM SERPL-MCNC: 2.3 MG/DL — SIGNIFICANT CHANGE UP (ref 1.6–2.6)
PHOSPHATE SERPL-MCNC: 6.7 MG/DL — HIGH (ref 2.5–4.5)
POTASSIUM SERPL-MCNC: 4.8 MMOL/L — SIGNIFICANT CHANGE UP (ref 3.5–5.3)
POTASSIUM SERPL-SCNC: 4.8 MMOL/L — SIGNIFICANT CHANGE UP (ref 3.5–5.3)
SODIUM SERPL-SCNC: 137 MMOL/L — SIGNIFICANT CHANGE UP (ref 135–145)

## 2022-08-16 PROCEDURE — 93325 DOPPLER ECHO COLOR FLOW MAPG: CPT

## 2022-08-16 PROCEDURE — 93320 DOPPLER ECHO COMPLETE: CPT

## 2022-08-16 PROCEDURE — 92960 CARDIOVERSION ELECTRIC EXT: CPT

## 2022-08-16 PROCEDURE — U0005: CPT

## 2022-08-16 PROCEDURE — 93312 ECHO TRANSESOPHAGEAL: CPT

## 2022-08-16 PROCEDURE — 84443 ASSAY THYROID STIM HORMONE: CPT

## 2022-08-16 PROCEDURE — 85027 COMPLETE CBC AUTOMATED: CPT

## 2022-08-16 PROCEDURE — 71046 X-RAY EXAM CHEST 2 VIEWS: CPT

## 2022-08-16 PROCEDURE — 99285 EMERGENCY DEPT VISIT HI MDM: CPT

## 2022-08-16 PROCEDURE — 93005 ELECTROCARDIOGRAM TRACING: CPT

## 2022-08-16 PROCEDURE — 84100 ASSAY OF PHOSPHORUS: CPT

## 2022-08-16 PROCEDURE — 80048 BASIC METABOLIC PNL TOTAL CA: CPT

## 2022-08-16 PROCEDURE — U0003: CPT

## 2022-08-16 PROCEDURE — 84484 ASSAY OF TROPONIN QUANT: CPT

## 2022-08-16 PROCEDURE — 93306 TTE W/DOPPLER COMPLETE: CPT

## 2022-08-16 PROCEDURE — 85025 COMPLETE CBC W/AUTO DIFF WBC: CPT

## 2022-08-16 PROCEDURE — 80053 COMPREHEN METABOLIC PANEL: CPT

## 2022-08-16 PROCEDURE — 36415 COLL VENOUS BLD VENIPUNCTURE: CPT

## 2022-08-16 PROCEDURE — 85730 THROMBOPLASTIN TIME PARTIAL: CPT

## 2022-08-16 PROCEDURE — 83880 ASSAY OF NATRIURETIC PEPTIDE: CPT

## 2022-08-16 PROCEDURE — 83735 ASSAY OF MAGNESIUM: CPT

## 2022-08-16 PROCEDURE — 85610 PROTHROMBIN TIME: CPT

## 2022-08-16 PROCEDURE — 93010 ELECTROCARDIOGRAM REPORT: CPT

## 2022-08-16 RX ORDER — AMIODARONE HYDROCHLORIDE 400 MG/1
1 TABLET ORAL
Qty: 0 | Refills: 0 | DISCHARGE
Start: 2022-08-16 | End: 2022-09-14

## 2022-08-16 RX ORDER — APIXABAN 2.5 MG/1
1 TABLET, FILM COATED ORAL
Qty: 60 | Refills: 0
Start: 2022-08-16 | End: 2022-09-14

## 2022-08-16 RX ORDER — LISINOPRIL 2.5 MG/1
1 TABLET ORAL
Qty: 30 | Refills: 0

## 2022-08-16 RX ORDER — ACETAMINOPHEN, DEXTROMETHORPHAN HYDROBROMIDE, AND DOXYLAMINE SUCCINATE 650; 30; 12.5 MG/30ML; MG/30ML; MG/30ML
2 LIQUID ORAL
Qty: 0 | Refills: 0 | DISCHARGE

## 2022-08-16 RX ORDER — AMIODARONE HYDROCHLORIDE 400 MG/1
200 TABLET ORAL
Refills: 0 | Status: CANCELLED | OUTPATIENT
Start: 2022-08-23 | End: 2022-08-16

## 2022-08-16 RX ORDER — SACUBITRIL AND VALSARTAN 24; 26 MG/1; MG/1
1 TABLET, FILM COATED ORAL
Qty: 60 | Refills: 0
Start: 2022-08-16 | End: 2022-09-14

## 2022-08-16 RX ORDER — AMIODARONE HYDROCHLORIDE 400 MG/1
2 TABLET ORAL
Qty: 120 | Refills: 0
Start: 2022-08-16 | End: 2022-09-14

## 2022-08-16 RX ORDER — AMIODARONE HYDROCHLORIDE 400 MG/1
200 TABLET ORAL DAILY
Refills: 0 | Status: CANCELLED | OUTPATIENT
Start: 2022-08-30 | End: 2022-08-16

## 2022-08-16 RX ORDER — APIXABAN 2.5 MG/1
1 TABLET, FILM COATED ORAL
Qty: 60 | Refills: 0

## 2022-08-16 RX ADMIN — APIXABAN 5 MILLIGRAM(S): 2.5 TABLET, FILM COATED ORAL at 05:18

## 2022-08-16 RX ADMIN — Medication 81 MILLIGRAM(S): at 11:48

## 2022-08-16 RX ADMIN — AMIODARONE HYDROCHLORIDE 400 MILLIGRAM(S): 400 TABLET ORAL at 05:19

## 2022-08-16 RX ADMIN — Medication 50 MILLIGRAM(S): at 05:18

## 2022-08-16 RX ADMIN — Medication 80 MILLIGRAM(S): at 05:19

## 2022-08-16 RX ADMIN — SACUBITRIL AND VALSARTAN 1 TABLET(S): 24; 26 TABLET, FILM COATED ORAL at 05:18

## 2022-08-16 RX ADMIN — SPIRONOLACTONE 25 MILLIGRAM(S): 25 TABLET, FILM COATED ORAL at 05:19

## 2022-08-16 RX ADMIN — CHLORHEXIDINE GLUCONATE 1 APPLICATION(S): 213 SOLUTION TOPICAL at 12:38

## 2022-08-16 NOTE — DISCHARGE NOTE NURSING/CASE MANAGEMENT/SOCIAL WORK - PATIENT PORTAL LINK FT
You can access the FollowMyHealth Patient Portal offered by [facility] by registering at the following website: [email or web address]. By joining [facility]’s FollowMyHealth portal, you will also be able to view your health information using other applications (apps) compatible with our system.

## 2022-08-16 NOTE — PROGRESS NOTE ADULT - ASSESSMENT
55 y/o male with PMHx of NICM (LVEF 25%, normal CTA of the coronary arteries 9/28/21), persistent AF (on eliquis), s/p GENE/DCCV 9/27/21, presented with acute on chronic heart failure in the setting of Afib with RVR and COVID positive. EPS consulted for possible GENE/DCCV when out of COVID isolation.    Recurrent Afib with RVR of unknown duration in the setting of acute on chronic heart failure and COVID-19 PNA    - Continue to monitor on telemetry  - Keep K>4 and Mg>2  - C/w eliquis 5mg BID  - C/w BBlocker for rate control, uptitrate for better rate control  - c/w GDMT  - S/p successful DCCV, remains in SR  - Continue Amiodarone load as follows: Amiodarone 400mg BID x1 week followed by Amiodarone 200mg BID x1 week followed by Amiodarone 200mg QD  - Follow up in EP clinic on     Pauline Narayan PA-C  #[id number]   55 y/o male with PMHx of NICM (LVEF 25%, normal CTA of the coronary arteries 9/28/21), persistent AF (on eliquis), s/p GENE/DCCV 9/27/21, presented with acute on chronic heart failure in the setting of Afib with RVR and COVID positive. EPS consulted for possible GENE/DCCV when out of COVID isolation.    Recurrent Afib with RVR of unknown duration in the setting of acute on chronic heart failure and COVID-19 PNA    - Continue to monitor on telemetry  - Keep K>4 and Mg>2  - C/w eliquis 5mg BID  - C/w BBlocker for rate control, uptitrate for better rate control  - c/w GDMT  - S/p successful DCCV, remains in SR  - Continue Amiodarone load as follows: Amiodarone 400mg BID x1 week followed by Amiodarone 200mg BID x1 week followed by Amiodarone 200mg QD  - Follow up in EP clinic on September 12 @ 11:00 AM (Please include in discharge paperwork)    Pauline Narayan PA-C  #[id number]

## 2022-08-16 NOTE — PHARMACOTHERAPY INTERVENTION NOTE - COMMENTS
Counseled patient on the discharge medications for atrial fibrillation and CHF on doses, directions, and possible side effects. Patient was provided a coupon card for $10 co-pay for Entresto. Patient questions and concerns were answered and addressed. Patient demonstrated understanding.    Discharge medications:  aMIOdarone 200 mg tablet, take 2 tablets by mouth 2 times a day until 8/22, 1 tablet 2 times a day from 8/23 to 8/29, then 1 tablet daily  apixaban 5 milliGRAM(s) Oral two times a day  aspirin enteric coated 81 milliGRAM(s) Oral daily  atorvastatin 40 milliGRAM(s) Oral at bedtime  furosemide    Tablet 80 milliGRAM(s) Oral daily  metoprolol succinate ER 50 milliGRAM(s) Oral daily  sacubitril 49 mG/valsartan 51 mG 1 Tablet(s) Oral two times a day  spironolactone 25 milliGRAM(s) Oral daily      Dru Garcia, PGY-1 Pharmacy Resident  [facility] [id number]

## 2022-08-16 NOTE — DISCHARGE NOTE NURSING/CASE MANAGEMENT/SOCIAL WORK - NSDCFUADDAPPT_GEN_ALL_CORE_FT
please follow up with your PCP in 1 week (if you don't have PCP, f/u with medicine clinic).  Follow up in EP clinic on September 12 @ 11:00 AM

## 2022-08-16 NOTE — PROGRESS NOTE ADULT - SUBJECTIVE AND OBJECTIVE BOX
24H hour events: No acute overnight events    MEDICATIONS:  aMIOdarone    Tablet   Oral   aMIOdarone    Tablet 400 milliGRAM(s) Oral two times a day  apixaban 5 milliGRAM(s) Oral two times a day  aspirin enteric coated 81 milliGRAM(s) Oral daily  furosemide    Tablet 80 milliGRAM(s) Oral daily  metoprolol succinate ER 50 milliGRAM(s) Oral daily  sacubitril 49 mG/valsartan 51 mG 1 Tablet(s) Oral two times a day  spironolactone 25 milliGRAM(s) Oral daily  atorvastatin 40 milliGRAM(s) Oral at bedtime  chlorhexidine 4% Liquid 1 Application(s) Topical daily      REVIEW OF SYSTEMS:  See HPI, otherwise ROS negative.    PHYSICAL EXAM:  T(C): 36.7 (08-16-22 @ 04:52), Max: 37.2 (08-15-22 @ 11:30)  HR: 82 (08-16-22 @ 04:52) (79 - 92)  BP: 121/84 (08-16-22 @ 04:52) (99/65 - 134/83)  RR: 16 (08-16-22 @ 04:52) (16 - 18)  SpO2: 95% (08-16-22 @ 04:52) (94% - 97%)  Wt(kg): --  I&O's Summary    15 Aug 2022 07:01  -  16 Aug 2022 07:00  --------------------------------------------------------  IN: 0 mL / OUT: 0 mL / NET: 0 mL        Appearance: Alert. NAD	  Cardiovascular: +S1S2 RRR no m/g/r  Respiratory: CTA B/L	  Extremities: No edema BLE  Vascular: Peripheral pulses palpable 2+ bilaterally      LABS:	 	    CBC Full  -  ( 15 Aug 2022 06:18 )  WBC Count : 8.04 K/uL  Hemoglobin : 15.9 g/dL  Hematocrit : 49.2 %  Platelet Count - Automated : 250 K/uL  Mean Cell Volume : 90.9 fl  Mean Cell Hemoglobin : 29.4 pg  Mean Cell Hemoglobin Concentration : 32.3 gm/dL  Auto Neutrophil # : x  Auto Lymphocyte # : x  Auto Monocyte # : x  Auto Eosinophil # : x  Auto Basophil # : x  Auto Neutrophil % : x  Auto Lymphocyte % : x  Auto Monocyte % : x  Auto Eosinophil % : x  Auto Basophil % : x    08-16    137  |  99  |  31<H>  ----------------------------<  96  4.8   |  26  |  1.16  08-15    140  |  101  |  25<H>  ----------------------------<  97  4.7   |  24  |  0.99    Ca    9.2      16 Aug 2022 06:15  Ca    9.1      15 Aug 2022 06:17  Phos  6.7     08-16  Mg     2.3     08-16  Mg     2.2     08-15    TELEMETRY: SR 70-90 BPM

## 2022-08-16 NOTE — DISCHARGE NOTE NURSING/CASE MANAGEMENT/SOCIAL WORK - NSDCPEFALRISK_GEN_ALL_CORE
For information on Fall & Injury Prevention, visit: [email or web address] OR  [email or web address] OR  https://www.cdc.gov/steadi/patient.html

## 2022-08-16 NOTE — PROGRESS NOTE ADULT - PROVIDER SPECIALTY LIST ADULT
Cardiology
Electrophysiology
Internal Medicine
Internal Medicine
Cardiology
Cardiology
Internal Medicine
Cardiology
Electrophysiology
Internal Medicine
Internal Medicine
Cardiology
Electrophysiology
Internal Medicine

## 2022-08-24 ENCOUNTER — APPOINTMENT (OUTPATIENT)
Dept: CARDIOLOGY | Facility: CLINIC | Age: 57
End: 2022-08-24

## 2022-08-24 NOTE — CARDIOLOGY SUMMARY
[de-identified] : 7/21/2022\par atrial fib flutter @108 bpm  [de-identified] : 2021\par LVEF 25%\par Tehtered mitral valve wit normal opeinig\par MIldly dilated LA\par Severe global LV systolic dysfunction\par RV enlargement with  RV systolic function\par Tethered tricuspid valve

## 2022-08-24 NOTE — DISCUSSION/SUMMARY
[FreeTextEntry1] : 56 year old male that presents for a cardiovascular assessment and evaluation.\par He carries a past medical history as outlined below:\par \par -Hx of new onset atrial fibrillation with RVR-> September 2021\par -Hx of HFrEF\par -Hx of LVEF 25%\par -Hx of normal CTA\par \par Hospitalized last September with new onset atrial fibrillation and heart failure-> S/P GENE/DCCV\par Placed on Eliquis, Metoprolol and loaded on Amiodarone at that time.\par \par Most recent echo/GENE: September 27, 2021\par \par Tethered mitral valve leaflets with normal opening\par MIldly dilated LA\par Severe global LV systolic dysfunction\par RV enlargement with decreased RV systolic function\par Tethered tricuspid valve sEvere TR\par \par Cardiac CT: September 28, 2021\par Normal CTA of the coronaries\par Bilateral pleural effusions and lower lob passive atelectasis\par RV may be enlarged. \par \par Patient has been lost to follow up. He stopped taking all medications when they "ran out" . He has not been on any medication since late October 2021.\par \par Blood pressure today:  146/91\par EKG- atrial fibrillation @ 108 bpm \par \par Patient reports that he becomes short of breath when he "hurries", denies any chest pain and denies symptomatic palpitations. \par \par Mr. Gray reports that he occasionally wakes from sleep and "jumps up" to catch his breath.\par \par

## 2022-08-24 NOTE — HISTORY OF PRESENT ILLNESS
[FreeTextEntry1] : 56 year old male that presents for a cardiovascular assessment and evaluation.\par He carries a past medical history as outlined below:\par \par -Hx of new onset atrial fibrillation with RVR-> September 2021\par -Hx of HFrEF\par -Hx of LVEF 25%\par -Hx of normal CTA\par \par Hospitalized last September with new onset atrial fibrillation and heart failure-> S/P GENE/DCCV\par Placed on Eliquis, Metoprolol and loaded on Amiodarone at that time.\par \par Most recent echo/GENE: September 27, 2021\par \par Tethered mitral valve leaflets with normal opening\par MIldly dilated LA\par Severe global LV systolic dysfunction\par RV enlargement with decreased RV systolic function\par Tethered tricuspid valve sEvere TR\par \par Cardiac CT: September 28, 2021\par Normal CTA of the coronaries\par Bilateral pleural effusions and lower lob passive atelectasis\par RV may be enlarged. \par \par Patient has been lost to follow up. He stopped taking all medications when they "ran out" . He has not been on any medication since late October 2021.\par \par Blood pressure today:  146/91\par EKG- atrial fibrillation @ 108 bpm \par \par Patient reports that he becomes short of breath when he "hurries", denies any chest pain and denies symptomatic palpitations. \par \par Mr. Gray reports that he occasionally wakes from sleep and "jumps up" to catch his breath.\par \par \par \par \par -.\par \par

## 2022-08-24 NOTE — ASSESSMENT
[FreeTextEntry1] : 6 year old male that presents for a cardiovascular assessment and evaluation.\par He carries a past medical history as outlined below:\par \par -Hx of new onset atrial fibrillation with RVR-> September 2021\par -Hx of HFrEF\par -Hx of LVEF 25%\par -Hx of normal CTA\par \par Hospitalized last September with new onset atrial fibrillation and heart failure-> S/P GENE/DCCV\par Placed on Eliquis, Metoprolol and loaded on Amiodarone at that time.\par \par Most recent echo/GENE: September 27, 2021\par \par Tethered mitral valve leaflets with normal opening\par MIldly dilated LA\par Severe global LV systolic dysfunction\par RV enlargement with decreased RV systolic function\par Tethered tricuspid valve sEvere TR\par \par Patient has been lost to follow up. He stopped taking all medications when they "ran out" . He has not been on any medication since late October 2021.

## 2022-09-12 ENCOUNTER — APPOINTMENT (OUTPATIENT)
Dept: ELECTROPHYSIOLOGY | Facility: CLINIC | Age: 57
End: 2022-09-12

## 2022-09-12 VITALS
HEART RATE: 70 BPM | OXYGEN SATURATION: 97 % | DIASTOLIC BLOOD PRESSURE: 71 MMHG | WEIGHT: 230 LBS | SYSTOLIC BLOOD PRESSURE: 104 MMHG | HEIGHT: 70 IN | BODY MASS INDEX: 32.93 KG/M2

## 2022-09-12 PROCEDURE — 99215 OFFICE O/P EST HI 40 MIN: CPT | Mod: 25

## 2022-09-12 PROCEDURE — 93000 ELECTROCARDIOGRAM COMPLETE: CPT

## 2022-09-12 RX ORDER — LISINOPRIL 10 MG/1
10 TABLET ORAL
Qty: 90 | Refills: 3 | Status: DISCONTINUED | COMMUNITY
Start: 2022-07-21 | End: 2022-09-12

## 2022-09-12 NOTE — PHYSICAL EXAM
[Well Developed] : well developed [Well Nourished] : well nourished [No Acute Distress] : no acute distress [Normal Conjunctiva] : normal conjunctiva [Normal Venous Pressure] : normal venous pressure [Normal S1, S2] : normal S1, S2 [No Murmur] : no murmur [No Rub] : no rub [No Gallop] : no gallop [Clear Lung Fields] : clear lung fields [Good Air Entry] : good air entry [No Respiratory Distress] : no respiratory distress  [Soft] : abdomen soft [Non Tender] : non-tender [Normal Bowel Sounds] : normal bowel sounds [Normal Gait] : normal gait [Gait - Sufficient for Exercise Testing] : gait - sufficient for exercise testing [No Edema] : no edema [Moves all extremities] : moves all extremities [No Focal Deficits] : no focal deficits [Normal Speech] : normal speech [Alert and Oriented] : alert and oriented [Normal memory] : normal memory

## 2022-09-14 NOTE — DISCUSSION/SUMMARY
[EKG obtained to assist in diagnosis and management of assessed problem(s)] : EKG obtained to assist in diagnosis and management of assessed problem(s) [FreeTextEntry1] : In summary, Mr. Gray is a 56 year old gentleman with a history of a nonischemic cardiomyopathy, heart failure with an EF of 25%, and atrial fibrillation and underwent a GENE guided cardioversion twice with last one in August 2022. he presents today in asymptomatic atrial flutter with rate control control. We discussed the pathophysiology of atrial fibrillation/flutter including management strategies. We discussed options of medication management versus ablation. We discussed that his atrial arrhythmias may be contributing to his LV dysfunction and by restoring sinus rhythm we can see if his LV function would improve. The options of a cardioversion and a catheter ablation were discussed. We discussed that his atrial arrhythmia recurred despite amiodarone and will likely recur if we proceeded with another cardioversion. Risks and benefits were discussed with each option. The procedures, outcomes and risks of ablation were reviewed. Risks discussed included but were not limited to bleeding, vascular damage, cardiac perforation, tamponade, phrenic nerve injury, stroke, pulmonary vein stenosis and atrial esophageal fistula. After all questions were answered, patient would like to proceed with an ablation. We will arrange for his procedure to be scheduled. We stressed the importance of medication compliance especially with his Eliquis. He will hold his Eliquis the morning of the procedure. \par \par Mr. Gray appeared to understand the whole discussion and verbalized that all of his questions were answered to his satisfaction.\par \par Thank you for allowing me to be involved in the care of this pleasant patient. Please feel free to contact me with any questions.\par \par \par Elvin Hamm MD\par  of Cardiology\par Electrophysiology Section\par [address] [place] [address], [address] [place] [id number]\par Office: [phone]\par Fax: [phone]\par \par \par \par \par

## 2022-09-14 NOTE — HISTORY OF PRESENT ILLNESS
[FreeTextEntry1] : I had the pleasure of seeing Mike Gray today for followup for atrial arrhythmias in the arrhythmia clinic at [facility]. As you well know, he is a pleasant 56 year old gentleman with a history of nonischemic cardiomyopathy diagnosed in September 2021 in the setting of atrial fibrillation with rapid ventricular rates. He underwent a GENE guided cardioversion at that time was placed on amiodarone for short term to maintain sinus rhythm with hopes to reassess LV function to see if it would improve. He had a cardiac CTA which showed normal coronaries at that time as well. He had taken his medications until October 2021 when he ran out of his medications. He was lost to follow and then saw Dr. Gustafson in July 2021 and found to be back in atrial fibrillation. He was admitted in August with COVID pneumonia and heart failure and was in atrial fibrillation with rapid ventricular rates. He underwent a GENE guided cardioversion and again placed on amiodarone to maintain sinus rhythm and reassess LV function in future to see if his EF recovers. Since discharge he reports feeling well and denies any palpitations, chest pain, shortness of breath, fatigue, near syncope or syncope. \par \par His ECG today shows patient is in typical atrial flutter. He has been compliant with medications including his amiodarone and Eliquis. His Echocardiogram from August 2022 showed an EF of 25% with moderate left atrial dilatation and no significant valvular abnormalities. This is similar to his Echocardiogram from September 2021 which showed an EF of around 25% with mild left atrial dilatation.\par

## 2022-09-14 NOTE — END OF VISIT
[FreeTextEntry3] : I, Dr. Elvin Hamm, personally performed the evaluation and management (E/M) services for this new patient.  That E/M includes conducting the initial examination, assessing all conditions, and establishing the plan of care.  Today, my ACP, Josey Lopez, was here to observe my evaluation and management services for this patient to be followed going forward.\par \par

## 2022-09-23 NOTE — CARDIOLOGY SUMMARY
[de-identified] : 7/21/2022\par atrial fib flutter @108 bpm  [de-identified] : 2021\par LVEF 25%\par Tehtered mitral valve wit normal opeinig\par MIldly dilated LA\par Severe global LV systolic dysfunction\par RV enlargement with  RV systolic function\par Tethered tricuspid valve

## 2022-09-23 NOTE — DISCUSSION/SUMMARY
[EKG obtained to assist in diagnosis and management of assessed problem(s)] : EKG obtained to assist in diagnosis and management of assessed problem(s) [FreeTextEntry1] : 56 year old male that presents for a cardiovascular assessment and evaluation.\par He carries a past medical history as outlined below:\par \par -Hx of new onset atrial fibrillation with RVR-> September 2021\par -Hx of HFrEF\par -Hx of LVEF 25%\par -Hx of normal CTA\par \par Hospitalized last September with new onset atrial fibrillation and heart failure-> S/P GENE/DCCV\par Placed on Eliquis, Metoprolol and loaded on Amiodarone at that time.\par \par Most recent echo/GENE: September 27, 2021\par \par Tethered mitral valve leaflets with normal opening\par MIldly dilated LA\par Severe global LV systolic dysfunction\par RV enlargement with decreased RV systolic function\par Tethered tricuspid valve sEvere TR\par \par Cardiac CT: September 28, 2021\par Normal CTA of the coronaries\par Bilateral pleural effusions and lower lob passive atelectasis\par RV may be enlarged. \par \par Patient has been lost to follow up. He stopped taking all medications when they "ran out" . He has not been on any medication since late October 2021.\par \par Blood pressure today:  146/91\par EKG- atrial fibrillation @ 108 bpm \par \par Patient reports that he becomes short of breath when he "hurries", denies any chest pain and denies symptomatic palpitations. \par \par Mr. Gray reports that he occasionally wakes from sleep and "jumps up" to catch his breath.\par - will refer for a repeat TTE asap - great concern for worsening LV function in setting of the recurrent afib\par - will resume a BB for rate control for the afib - pt is not in overt heart failure - thius will be able to tolerate the start of a BB\par - will start eliquis for AC \par - the patient will need to be seen by EP to arrange for a possible GENE with DCCV with possible future discussions of ablation\par - the importance of medication compliance was stressed - especially with eliquis\par - BP stable\par - Encouraged the patient to monitor blood pressure at home, keep a log, and report results back to us for evaluation. Based on results, we will adjust the regimen as necessary.\par - ECG c/w afib\par - will start Lisinopril for afterload reduction: no need for diuresis at this time\par - advised to monitor salt intake\par - Encouraged patient to continue healthy exercise and eating habits, focusing on a Mediterranean style of eating and aiming for the recommended 150 minutes per week of moderate physical activity.\par - Encouraged the patient to find healthy outlets and coping mechanisms to help manage stress, such as physical activity/exercise, reducing workload if possible, spending time with family and friends, engaging in an enjoyable hobby, or using meditation or mindfulness techniques.\par \par \par \par \par

## 2022-10-24 ENCOUNTER — NON-APPOINTMENT (OUTPATIENT)
Age: 57
End: 2022-10-24

## 2022-11-22 DIAGNOSIS — Z20.822 CONTACT WITH AND (SUSPECTED) EXPOSURE TO COVID-19: ICD-10-CM

## 2022-11-26 NOTE — PROGRESS NOTE ADULT - ASSESSMENT
Patient remains in bed in no acute distress, no changes in patient condition.    55 yo man PMHx of paroxysmal Afib and NICM TTE EF 25%, who was admitted for acute on chronic decompensated HF exacerbation in setting of COVID-19 infection, on IV diuretics.  *******NOTE INCOMPLETE********    REC:  #1. Acute on chronic decompensated HF exacerbation  - I/O not accurately recorded, please order strict I/O and obtain daily standing weight  - Currently on GDMT: Toprol XL 50mg daily, Entresto 24/26 BID, and Aldactone 25mg daily  - Would recommend switching to PO Lasix 80mg and increasing Entresto to 49/51 starting today     #2.  Afib  - Tele shows Afib/Aflutter in the 70s to 90s   - Eliquis 5mg BID for systemic embolization prevention  - C/W BB as above  - Plan for GENE/DCCV when patient off isolation from COVID (dependent on infection control)      Bereket Villalta M.D.  PGY-2   55 yo man PMHx of paroxysmal Afib and NICM TTE EF 25%, who was admitted for acute on chronic decompensated HF exacerbation in setting of COVID-19 infection, on IV diuretics.    REC:  #1. Acute on chronic decompensated HF exacerbation  - I/O not accurately recorded, please order strict I/O and obtain daily standing weight  - Currently on GDMT: Toprol XL 50mg daily, Entresto 49/51 BID, and Aldactone 25mg daily  - Would recommend conmtinuing PO Lasix 80mg     #2.  Afib  - Tele shows Afib/Aflutter in the 70s to 90s   - Eliquis 5mg BID for systemic embolization prevention  - C/W BB as above  - Plan for GENE/DCCV when patient off isolation from COVID (dependent on infection control)      Bereket Villalta M.D.  PGY-2   57 yo man PMHx of paroxysmal Afib and NICM TTE EF 25%, who was admitted for acute on chronic decompensated HF exacerbation in setting of COVID-19 infection, on IV diuretics.      REC:  #1. Acute on chronic decompensated HF exacerbation  - I/O not accurately recorded, please order strict I/O and obtain daily standing weight  - Currently on GDMT: Toprol XL 50mg daily, Entresto 49/51 BID, and Aldactone 25mg daily  - Would recommend continuing PO Lasix 80mg     #2.  Afib  - Tele shows Afib/Aflutter in the 70s to 90s   - Eliquis 5mg BID for systemic embolization prevention  - C/W BB as above  - Plan for GENE/DCCV when patient off isolation from COVID (dependent on infection control)      Bereket Villalta M.D.  PGY-2; cardiology resident    Plan discussed with cardiology fellow and resident.  Patient seen and examined.  Hx., exam and labs as above.  I agree with the assessment and recommendations, which I have reviewed and edited where appropriate.  John Del Rosario M.D.  Cardiology Attending, Consult Service  For Cardiology consults and questions, all Cardiology service information can be found 24/7 on [email or web address] - use password: [email or web address] to log in.

## 2022-12-14 ENCOUNTER — APPOINTMENT (OUTPATIENT)
Dept: ELECTROPHYSIOLOGY | Facility: CLINIC | Age: 57
End: 2022-12-14

## 2022-12-20 LAB — SARS-COV-2 N GENE NPH QL NAA+PROBE: NOT DETECTED

## 2022-12-22 ENCOUNTER — TRANSCRIPTION ENCOUNTER (OUTPATIENT)
Age: 57
End: 2022-12-22

## 2022-12-22 ENCOUNTER — INPATIENT (INPATIENT)
Facility: HOSPITAL | Age: 57
LOS: 0 days | Discharge: HOME | DRG: 274 | End: 2022-12-22
Attending: INTERNAL MEDICINE | Admitting: INTERNAL MEDICINE
Payer: COMMERCIAL

## 2022-12-22 VITALS
HEIGHT: 70 IN | RESPIRATION RATE: 20 BRPM | DIASTOLIC BLOOD PRESSURE: 84 MMHG | OXYGEN SATURATION: 97 % | TEMPERATURE: 98 F | HEART RATE: 84 BPM | WEIGHT: 235.01 LBS | SYSTOLIC BLOOD PRESSURE: 148 MMHG

## 2022-12-22 VITALS — HEART RATE: 82 BPM

## 2022-12-22 DIAGNOSIS — Z98.890 OTHER SPECIFIED POSTPROCEDURAL STATES: Chronic | ICD-10-CM

## 2022-12-22 DIAGNOSIS — Z86.018 PERSONAL HISTORY OF OTHER BENIGN NEOPLASM: Chronic | ICD-10-CM

## 2022-12-22 DIAGNOSIS — I48.91 UNSPECIFIED ATRIAL FIBRILLATION: ICD-10-CM

## 2022-12-22 DIAGNOSIS — D15.1 BENIGN NEOPLASM OF HEART: Chronic | ICD-10-CM

## 2022-12-22 LAB
ANION GAP SERPL CALC-SCNC: 9 MMOL/L — SIGNIFICANT CHANGE UP (ref 5–17)
BLD GP AB SCN SERPL QL: NEGATIVE — SIGNIFICANT CHANGE UP
BUN SERPL-MCNC: 29 MG/DL — HIGH (ref 7–23)
CALCIUM SERPL-MCNC: 9.1 MG/DL — SIGNIFICANT CHANGE UP (ref 8.4–10.5)
CHLORIDE SERPL-SCNC: 102 MMOL/L — SIGNIFICANT CHANGE UP (ref 96–108)
CO2 SERPL-SCNC: 28 MMOL/L — SIGNIFICANT CHANGE UP (ref 22–31)
CREAT SERPL-MCNC: 1.14 MG/DL — SIGNIFICANT CHANGE UP (ref 0.5–1.3)
EGFR: 75 ML/MIN/1.73M2 — SIGNIFICANT CHANGE UP
GLUCOSE SERPL-MCNC: 107 MG/DL — HIGH (ref 70–99)
HCT VFR BLD CALC: 38 % — LOW (ref 39–50)
HGB BLD-MCNC: 12.4 G/DL — LOW (ref 13–17)
INR BLD: 0.98 RATIO — SIGNIFICANT CHANGE UP (ref 0.88–1.16)
MCHC RBC-ENTMCNC: 31.4 PG — SIGNIFICANT CHANGE UP (ref 27–34)
MCHC RBC-ENTMCNC: 32.6 GM/DL — SIGNIFICANT CHANGE UP (ref 32–36)
MCV RBC AUTO: 96.2 FL — SIGNIFICANT CHANGE UP (ref 80–100)
NRBC # BLD: 0 /100 WBCS — SIGNIFICANT CHANGE UP (ref 0–0)
PLATELET # BLD AUTO: 250 K/UL — SIGNIFICANT CHANGE UP (ref 150–400)
POTASSIUM SERPL-MCNC: 4.3 MMOL/L — SIGNIFICANT CHANGE UP (ref 3.5–5.3)
POTASSIUM SERPL-SCNC: 4.3 MMOL/L — SIGNIFICANT CHANGE UP (ref 3.5–5.3)
PROTHROM AB SERPL-ACNC: 11.4 SEC — SIGNIFICANT CHANGE UP (ref 10.5–13.4)
RBC # BLD: 3.95 M/UL — LOW (ref 4.2–5.8)
RBC # FLD: 13.9 % — SIGNIFICANT CHANGE UP (ref 10.3–14.5)
RH IG SCN BLD-IMP: POSITIVE — SIGNIFICANT CHANGE UP
SODIUM SERPL-SCNC: 139 MMOL/L — SIGNIFICANT CHANGE UP (ref 135–145)
WBC # BLD: 7.1 K/UL — SIGNIFICANT CHANGE UP (ref 3.8–10.5)
WBC # FLD AUTO: 7.1 K/UL — SIGNIFICANT CHANGE UP (ref 3.8–10.5)

## 2022-12-22 PROCEDURE — 86850 RBC ANTIBODY SCREEN: CPT

## 2022-12-22 PROCEDURE — 93653 COMPRE EP EVAL TX SVT: CPT

## 2022-12-22 PROCEDURE — 93010 ELECTROCARDIOGRAM REPORT: CPT | Mod: 77

## 2022-12-22 PROCEDURE — C1894: CPT

## 2022-12-22 PROCEDURE — C1732: CPT

## 2022-12-22 PROCEDURE — C1759: CPT

## 2022-12-22 PROCEDURE — 93656 COMPRE EP EVAL ABLTJ ATR FIB: CPT

## 2022-12-22 PROCEDURE — C1730: CPT

## 2022-12-22 PROCEDURE — 85610 PROTHROMBIN TIME: CPT

## 2022-12-22 PROCEDURE — 93657 TX L/R ATRIAL FIB ADDL: CPT

## 2022-12-22 PROCEDURE — C1766: CPT

## 2022-12-22 PROCEDURE — 86900 BLOOD TYPING SEROLOGIC ABO: CPT

## 2022-12-22 PROCEDURE — 93005 ELECTROCARDIOGRAM TRACING: CPT

## 2022-12-22 PROCEDURE — C1760: CPT

## 2022-12-22 PROCEDURE — 86901 BLOOD TYPING SEROLOGIC RH(D): CPT

## 2022-12-22 PROCEDURE — 93655 ICAR CATH ABLTJ DSCRT ARRHYT: CPT

## 2022-12-22 PROCEDURE — 93010 ELECTROCARDIOGRAM REPORT: CPT

## 2022-12-22 PROCEDURE — 85027 COMPLETE CBC AUTOMATED: CPT

## 2022-12-22 PROCEDURE — 80048 BASIC METABOLIC PNL TOTAL CA: CPT

## 2022-12-22 PROCEDURE — 93623 PRGRMD STIMJ&PACG IV RX NFS: CPT

## 2022-12-22 PROCEDURE — C1889: CPT

## 2022-12-22 RX ORDER — FUROSEMIDE 40 MG
1 TABLET ORAL
Qty: 30 | Refills: 0
Start: 2022-12-22 | End: 2023-01-20

## 2022-12-22 RX ORDER — METOPROLOL TARTRATE 50 MG
1 TABLET ORAL
Qty: 30 | Refills: 0
Start: 2022-12-22 | End: 2023-01-20

## 2022-12-22 RX ORDER — SACUBITRIL AND VALSARTAN 24; 26 MG/1; MG/1
1 TABLET, FILM COATED ORAL
Qty: 60 | Refills: 0
Start: 2022-12-22 | End: 2023-01-20

## 2022-12-22 RX ORDER — SODIUM CHLORIDE 9 MG/ML
3 INJECTION INTRAMUSCULAR; INTRAVENOUS; SUBCUTANEOUS EVERY 8 HOURS
Refills: 0 | Status: DISCONTINUED | OUTPATIENT
Start: 2022-12-22 | End: 2022-12-22

## 2022-12-22 RX ORDER — ASPIRIN/CALCIUM CARB/MAGNESIUM 324 MG
81 TABLET ORAL DAILY
Refills: 0 | Status: DISCONTINUED | OUTPATIENT
Start: 2022-12-22 | End: 2022-12-22

## 2022-12-22 RX ORDER — ATORVASTATIN CALCIUM 80 MG/1
40 TABLET, FILM COATED ORAL AT BEDTIME
Refills: 0 | Status: DISCONTINUED | OUTPATIENT
Start: 2022-12-22 | End: 2022-12-22

## 2022-12-22 RX ORDER — ATORVASTATIN CALCIUM 80 MG/1
1 TABLET, FILM COATED ORAL
Qty: 30 | Refills: 0
Start: 2022-12-22 | End: 2023-01-20

## 2022-12-22 RX ORDER — AMIODARONE HYDROCHLORIDE 400 MG/1
1 TABLET ORAL
Qty: 30 | Refills: 0
Start: 2022-12-22 | End: 2023-01-20

## 2022-12-22 RX ORDER — APIXABAN 2.5 MG/1
5 TABLET, FILM COATED ORAL EVERY 12 HOURS
Refills: 0 | Status: DISCONTINUED | OUTPATIENT
Start: 2022-12-22 | End: 2022-12-22

## 2022-12-22 RX ORDER — METOPROLOL TARTRATE 50 MG
1 TABLET ORAL
Qty: 30 | Refills: 0

## 2022-12-22 RX ORDER — METOPROLOL TARTRATE 50 MG
50 TABLET ORAL DAILY
Refills: 0 | Status: DISCONTINUED | OUTPATIENT
Start: 2022-12-22 | End: 2022-12-22

## 2022-12-22 RX ORDER — APIXABAN 2.5 MG/1
1 TABLET, FILM COATED ORAL
Qty: 60 | Refills: 0
Start: 2022-12-22 | End: 2023-01-20

## 2022-12-22 RX ORDER — FUROSEMIDE 40 MG
20 TABLET ORAL ONCE
Refills: 0 | Status: COMPLETED | OUTPATIENT
Start: 2022-12-22 | End: 2022-12-22

## 2022-12-22 RX ORDER — ASPIRIN/CALCIUM CARB/MAGNESIUM 324 MG
1 TABLET ORAL
Qty: 30 | Refills: 0
Start: 2022-12-22 | End: 2023-01-20

## 2022-12-22 RX ORDER — AMIODARONE HYDROCHLORIDE 400 MG/1
200 TABLET ORAL DAILY
Refills: 0 | Status: DISCONTINUED | OUTPATIENT
Start: 2022-12-22 | End: 2022-12-22

## 2022-12-22 RX ORDER — SACUBITRIL AND VALSARTAN 24; 26 MG/1; MG/1
1 TABLET, FILM COATED ORAL
Refills: 0 | Status: DISCONTINUED | OUTPATIENT
Start: 2022-12-22 | End: 2022-12-22

## 2022-12-22 RX ORDER — SPIRONOLACTONE 25 MG/1
1 TABLET, FILM COATED ORAL
Qty: 30 | Refills: 0
Start: 2022-12-22 | End: 2023-01-20

## 2022-12-22 RX ORDER — FUROSEMIDE 40 MG
40 TABLET ORAL ONCE
Refills: 0 | Status: DISCONTINUED | OUTPATIENT
Start: 2022-12-22 | End: 2022-12-22

## 2022-12-22 RX ORDER — SPIRONOLACTONE 25 MG/1
25 TABLET, FILM COATED ORAL DAILY
Refills: 0 | Status: DISCONTINUED | OUTPATIENT
Start: 2022-12-22 | End: 2022-12-22

## 2022-12-22 RX ADMIN — APIXABAN 5 MILLIGRAM(S): 2.5 TABLET, FILM COATED ORAL at 14:49

## 2022-12-22 RX ADMIN — SACUBITRIL AND VALSARTAN 1 TABLET(S): 24; 26 TABLET, FILM COATED ORAL at 14:49

## 2022-12-22 RX ADMIN — AMIODARONE HYDROCHLORIDE 200 MILLIGRAM(S): 400 TABLET ORAL at 14:49

## 2022-12-22 RX ADMIN — Medication 20 MILLIGRAM(S): at 14:49

## 2022-12-22 RX ADMIN — SODIUM CHLORIDE 3 MILLILITER(S): 9 INJECTION INTRAMUSCULAR; INTRAVENOUS; SUBCUTANEOUS at 14:43

## 2022-12-22 NOTE — ASU DISCHARGE PLAN (ADULT/PEDIATRIC) - NS MD DC FALL RISK RISK
For information on Fall & Injury Prevention, visit: [email or web address] OR  [email or web address] OR  https://www.cdc.gov/steadi/patient.html

## 2022-12-22 NOTE — H&P CARDIOLOGY - HISTORY OF PRESENT ILLNESS
56Y M w/ PMH of Nicole wDeisy kenneyv in 9/2021, HFrEF (EF: 25%; 7/21/22, with nonischemic cardiomyopathy diagnosed in September 2021 in the setting of atrial fibrillation with rapid ventricular rates. He underwent a GENE guided cardioversion at that time was placed on amiodarone for short term to maintain sinus rhythm with hopes to reassess LV function to see if it would improve. He had a cardiac CTA which showed normal coronaries at that time as well. He had taken his medications until October 2021 when he ran out of his medications. He was admitted in August with COVID pneumonia and heart failure and was in atrial fibrillation with rapid ventricular rates. He underwent a GENE guided cardioversion and again placed on amiodarone to maintain sinus rhythm and reassess LV function in future to see if his EF recovers. Since discharge he reports feeling well and denies any palpitations, chest pain, shortness of breath, fatigue, near syncope or syncope. Seen & evaluated by Dr Hannah & now recommends for Afib Ablation.     His ECG today shows patient is in typical atrial flutter. He has been compliant with medications including his amiodarone and Eliquis. His Echocardiogram from August 2022 showed an EF of 25% with moderate left atrial dilatation and no significant valvular abnormalities. This is similar to his Echocardiogram from September 2021 which showed an EF of around 25% with mild left atrial dilatation.    56Y M w/ PMH of ? 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 dccv in 9/2021, HFrEF (EF: 25%; 7/21/22, with nonischemic cardiomyopathy diagnosed in September 2021 in the setting of atrial fibrillation with rapid ventricular rates. He underwent a GENE guided cardioversion at that time was placed on amiodarone for short term to maintain sinus rhythm with hopes to reassess LV function to see if it would improve. He had a cardiac CTA which showed normal coronaries at that time as well. He had taken his medications until October 2021 when he ran out of his medications. He was admitted in August with COVID pneumonia and heart failure and was in atrial fibrillation with rapid ventricular rates. He underwent a GENE guided cardioversion and again placed on amiodarone to maintain sinus rhythm and reassess LV function in future to see if his EF recovers. Since discharge he reports feeling well and denies any palpitations, chest pain, shortness of breath, fatigue, near syncope or syncope. Seen & evaluated by Dr Hannah & now recommends for Afib Ablation.     His ECG today shows patient is in typical atrial flutter. He has been compliant with medications including his amiodarone and Eliquis. His Echocardiogram from August 2022 showed an EF of 25% with moderate left atrial dilatation and no significant valvular abnormalities. This is similar to his Echocardiogram from September 2021 which showed an EF of around 25% with mild left atrial dilatation.

## 2022-12-22 NOTE — DISCHARGE NOTE NURSING/CASE MANAGEMENT/SOCIAL WORK - NSDCPEFALRISK_GEN_ALL_CORE
For information on Fall & Injury Prevention, visit: [email or web address] OR  [email or web address] OR  https://www.cdc.gov/steadi/patient.html

## 2022-12-22 NOTE — H&P CARDIOLOGY - NSICDXPASTSURGICALHX_GEN_ALL_CORE_FT
PAST SURGICAL HISTORY:  History of arthroscopy of knee     History of myxoma      PAST SURGICAL HISTORY:  History of arthroscopy of knee     Myxoma of heart surgically removed

## 2022-12-22 NOTE — H&P CARDIOLOGY - NSICDXPASTMEDICALHX_GEN_ALL_CORE_FT
PAST MEDICAL HISTORY:  Afib     Congestive heart failure      PAST MEDICAL HISTORY:  Afib     Congestive heart failure     Myxoma     Obesity     ALMA (obstructive sleep apnea)

## 2022-12-22 NOTE — ASU DISCHARGE PLAN (ADULT/PEDIATRIC) - ASU DC SPECIAL INSTRUCTIONSFT
WOUND CARE:  The day AFTER your procedure  - Remove the bandage at the site GENTLY, clean with mild soap and water, and pat dry; leave open to air  - You may shower   - DO NOT apply lotions, creams, ointments, powder, perfumes to your incision site  -Check your groin every day. A small amount of bruising or soreness is normal, a bump ( smaller than nickel) might be present, normal  - DO NOT SOAK your site for 1 week ( no baths, no pools, no tubs, etc..)    ACTIVITY:  Your procedure was done through your groin  for the next 5 DAYS:  - Limit climbing stairs, no strenuous activity, pushing , pulling, or straining   DO NOT LIFT anything 10 lbs or heavier   you may resume sexual activity in 7 days, unless instructed otherwise  mild palpitations are normal     Follow heart healthy diet recommended by your doctor, , if you smoke STOP SMOKING ( may call [phone] for center of tobacco control if you need assistance).  for the next 24 hours:   - stay at home and rest, do not drive or operate heavy machinery   do not drink alcoholic beverages   do not make important personal or business decisions     ***CALL YOUR DOCTOR ***  IF you have fever, chills, body aches, or severe pain, swelling, redness, heat, yellow drainage from your incision site  IF bleeding  or significant new swelling from your puncture site  IF you experience rapid heartbeat or palpitations that cause: lightheadedness', dizziness, or fainting spell.  If you experience difficulty swallowing, or pain with swallowing   IF unable to ge tin contact with your doctor, you may call the Cardiology Office at [facility] at [phone]

## 2022-12-22 NOTE — ASU DISCHARGE PLAN (ADULT/PEDIATRIC) - CARE PROVIDER_API CALL
Elvin Hamm)  Cardiac Electrophysiology; Cardiology  [address] [place] [address]  Phone: [phone]  Fax: [phone]  Scheduled Appointment: 02/08/2023 09:15 AM

## 2023-02-08 ENCOUNTER — APPOINTMENT (OUTPATIENT)
Dept: ELECTROPHYSIOLOGY | Facility: CLINIC | Age: 58
End: 2023-02-08
Payer: COMMERCIAL

## 2023-02-08 ENCOUNTER — NON-APPOINTMENT (OUTPATIENT)
Age: 58
End: 2023-02-08

## 2023-02-08 VITALS — SYSTOLIC BLOOD PRESSURE: 128 MMHG | HEART RATE: 86 BPM | DIASTOLIC BLOOD PRESSURE: 85 MMHG

## 2023-02-08 DIAGNOSIS — I42.9 CARDIOMYOPATHY, UNSPECIFIED: ICD-10-CM

## 2023-02-08 DIAGNOSIS — I48.91 UNSPECIFIED ATRIAL FIBRILLATION: ICD-10-CM

## 2023-02-08 PROBLEM — G47.33 OBSTRUCTIVE SLEEP APNEA (ADULT) (PEDIATRIC): Chronic | Status: ACTIVE | Noted: 2022-12-22

## 2023-02-08 PROBLEM — D21.9 BENIGN NEOPLASM OF CONNECTIVE AND OTHER SOFT TISSUE, UNSPECIFIED: Chronic | Status: ACTIVE | Noted: 2022-12-22

## 2023-02-08 PROBLEM — E66.9 OBESITY, UNSPECIFIED: Chronic | Status: ACTIVE | Noted: 2022-12-22

## 2023-02-08 PROCEDURE — 99214 OFFICE O/P EST MOD 30 MIN: CPT | Mod: 25

## 2023-02-08 PROCEDURE — ZZZZZ: CPT

## 2023-02-08 PROCEDURE — 93000 ELECTROCARDIOGRAM COMPLETE: CPT

## 2023-02-08 RX ORDER — AMIODARONE HYDROCHLORIDE 200 MG/1
200 TABLET ORAL
Qty: 90 | Refills: 1 | Status: DISCONTINUED | COMMUNITY
Start: 2022-09-12 | End: 2023-02-08

## 2023-02-08 NOTE — HISTORY OF PRESENT ILLNESS
[FreeTextEntry1] : I had the pleasure of seeing Mike Gray today for followup for atrial arrhythmias in the arrhythmia clinic at [facility]. As you well know, he is a pleasant 57 year old gentleman with a history of nonischemic cardiomyopathy diagnosed in September 2021 in the setting of atrial fibrillation and flutter with rapid ventricular rates. Despite cardioversion on amiodarone he had recurrence of arrhythmia at follow up visit and on 12/22/22 returned to the EP lab for ablation, where pulmonary vein isolation and cavotricuspid isthmus ablation were performed.  He returns now for follow up, noting improved energy and lessened SOB. He has no CP, dizziness, palpitations or syncope. He denies unexplained fever or dysphagia. He has had normal access site healing.  He ran out of amiodarone about a week ago.\par \par \par His Echocardiogram from August 2022 showed an EF of 25% with moderate left atrial dilatation and no significant valvular abnormalities. This is similar to his Echocardiogram from September 2021 which showed an EF of around 25% with mild left atrial dilatation.\par

## 2023-02-08 NOTE — DISCUSSION/SUMMARY
[EKG obtained to assist in diagnosis and management of assessed problem(s)] : EKG obtained to assist in diagnosis and management of assessed problem(s) [FreeTextEntry1] : In summary, Mr. Gray is a 57 year old gentleman with a history of a nonischemic cardiomyopathy, heart failure with an EF of 25%, and atrial fibrillation now s/p ablation. I am pleased to see him doing well today in sinus rhythm. An event monitor will be sent to him at the conclusion of the 3 month blanking period.  Amiodarone will be stopped today.  He will also go for repeat TTE in the coming months; if LV function is normalized would be reasonable to start backing off on several of his heart failure medications.  He will follow up in 6 months.\par \par Mr. Gray appeared to understand the whole discussion and verbalized that all of his questions were answered to his satisfaction.\par \par Thank you for allowing me to be involved in the care of this pleasant patient. Please feel free to contact me with any questions.\par \par \par Elvin Hamm MD\par  of Cardiology\par Electrophysiology Section\[address], [address] [place] [address]\[place] [address]\par Office: [phone]\par Fax: [phone]\par \par \par \par \par

## 2023-02-24 RX ORDER — SACUBITRIL AND VALSARTAN 49; 51 MG/1; MG/1
49-51 TABLET, FILM COATED ORAL TWICE DAILY
Qty: 180 | Refills: 3 | Status: ACTIVE | COMMUNITY
Start: 2022-09-12 | End: 1900-01-01

## 2023-02-24 RX ORDER — FUROSEMIDE 80 MG/1
80 TABLET ORAL DAILY
Qty: 90 | Refills: 1 | Status: ACTIVE | COMMUNITY
Start: 2022-09-12 | End: 1900-01-01

## 2023-04-28 ENCOUNTER — APPOINTMENT (OUTPATIENT)
Dept: CV DIAGNOSITCS | Facility: HOSPITAL | Age: 58
End: 2023-04-28

## 2023-05-29 NOTE — DISCHARGE NOTE NURSING/CASE MANAGEMENT/SOCIAL WORK - NSDCPEELIQUISREACT_GEN_ALL_CORE
Apixaban/Eliquis increases your risk for bleeding. Notify your doctor if you experience any of the following side effects: bleeding, coughing or vomiting blood, red or black stool, unexpected pain or swelling, itching or hives, chest pain, chest tightness, trouble breathing, changes in how much or how often you urinate, red or pink urine, numbness or tingling in your feet, or unusual muscle weakness. When Apixaban/Eliquis is taken with other medicines, they can affect how it works. Taking other medications such as aspirin, blood thinners, nonsteroidal anti-inflammatories, and medications that treat depression can increase your risk of bleeding. It is very important to tell your health care provider about all of the other medicines, including over-the-counter medications, herbs, and vitamins you are taking. DO NOT start, stop, or change the dosage of any medicine, including over-the-counter medicines, vitamins, and herbal products without your doctor’s approval. Any products containing aspirin or are nonsteroidal anti-inflammatories lessen the blood’s ability to form clots and add to the effect of Apixaban/Eliquis. Never take aspirin or medicines that contain aspirin without speaking to your doctor.
Pupils equal, round and reactive to light, Extra-ocular movement intact, eyes are clear b/l

## 2023-07-12 NOTE — PROGRESS NOTE ADULT - ASSESSMENT
A/P     # Acute decompensated systolic heart failure :  -pt. didn't follow with his cardio as out pt. and ran out of meds   decrease lasix to 80 mg IV q daily   cardio following   breathing much better     Ch afib :   -on eliquis   rate controlled   plan is for cardioversion on Monday     # CHF :  c/w toprol XL   d/c losartan and started on entresto   aldactone 25 mg added       HLD :  -c/w statin     Noncompliance with meds : counseling done      Statement Selected

## 2023-08-09 ENCOUNTER — APPOINTMENT (OUTPATIENT)
Dept: ELECTROPHYSIOLOGY | Facility: CLINIC | Age: 58
End: 2023-08-09

## 2024-02-19 RX ORDER — APIXABAN 5 MG/1
5 TABLET, FILM COATED ORAL
Qty: 60 | Refills: 3 | Status: ACTIVE | COMMUNITY
Start: 2022-07-21 | End: 1900-01-01

## 2024-03-12 NOTE — ED ADULT NURSE NOTE - FINAL NURSING ELECTRONIC SIGNATURE
"[facility]   Occupational Therapy Treatment Note     Name: Allyson Estrada  [facility] Number: [id number]    Therapy Diagnosis:   Encounter Diagnoses   Name Primary?    Decreased strength Yes    Deficits in activities of daily living     Decreased coordination      Physician: Jude Méndez MD    Physician Orders: Eval and Treat  Medical Diagnosis: G93.1 (ICD-10-CM) - Anoxic brain injury   Evaluation Date: 3/12/2024  Insurance Authorization Period Expiration:   Plan of Care Certification Period: 5/28/2024   Progress note due: 4/5/2024, 5/3/2024  Date of Return to MD: To be determined  Visit # / Visits authorized: 1/20 (+eval)  FOTO: 1/ 3, last assessed 3/8/2024    Precautions:  Standard    Time In: 1730  Time Out: 1815  Total Billable Time: 45 minutes    Subjective     Pt reports: "Good."  he was compliant with home exercise program given last session, (not given).   Response to previous treatment: no concerns  Functional change: no concerns    Pain: 0/10  Location: N/a    Objective       At 1732, SC=355/89, HR=61, SpO2=99%.    Treatment     Emerson received the treatments listed below:     Therapeutic exercises to develop strength and endurance for 11 minutes, including:    -UBE x5 min forward/ 5 min back w/ the bilateral UE, level 2.0, standing to increase activity tolerance, strength and coordination for reciprocal movements in the bilateral UE. West avg=16, peak west=20. Patient rated RPE as "easy". He was able to multi-task talking to therapist better today.     Therapeutic activities to improve functional performance for 34 minutes, including:    -Patient copied one page of "The Story of [employer]". Patient skipped one word but had 100% legibility.     -Picking up and placing small marbles one at a time in 4 groups of 5 to improve finger<>palm translation for in-hand manipulation. Patient alternated hands (2 sets on the right, 2 sets on the left)    -Patient tied five knots in small rope to " "practice tying his shoes. He attempted to do two "bunny ears" and tie them together but could not complete. He required max verbal cues throughout for sequencing.     -Patient completed button board. He was able to unbutton all 5 buttons without help. He did require therapist to complete 1 button when re-buttoning. Therapist also assisted to hold the hole open for 2 of them and he was able to push the button through. He buttoned the last 2 independently.     Neuromuscular re-education activities to improve Balance and Coordination for 0 minutes. The following activities were included:  N/a    Home Exercises and Education Provided     Education provided:   - Handwriting exercises at home   - Progress towards goals     Written Home Exercises Provided: yes.  Exercises were reviewed and Emerson was able to demonstrate them prior to the end of the session.  Emerson demonstrated good  understanding of the HEP provided.     See EMR under Patient Instructions for exercises provided 3/8/2024.     Assessment     Emerson tolerated his session very well today. Increased west average and peak west during UBE. He was better able to talk while completing UBE as well today. Handwriting was better with only one word skipped and 100% legibility. He continues to have difficulty with completing the bow for shoe tying. He had no difficulty with in-hand manipulation of smaller marbles.     Emerson's rehab potential is Good.     Anticipated barriers to occupational therapy: Potentially transportation as patient does not drive.    Patient's spiritual, cultural and educational needs considered and patient is agreeable to the plan of care and goals as stated below:     Goals:  Short Term Goals:    Short Term Goals (6 weeks) Status When Last Assessed  Progressing/ Met   1) Pt will complete HEP with minimal verbal cues from parent  progressing 3/12/2024    2) Handwriting to be assessed and goal added Completed 3/8/2024. See goal below.  Completed " 3/8/2024    3) Pt will increase  strength as evidenced by 5# increase on dynamometer bilaterally R: 46.3#  L: 63.3# progressing 3/12/2024    4) Pt to be able to wash his back in the shower independently with adaptive equipment as needed  progressing 3/12/2024    5) Pts parent to report increased independence with oral hygiene including appropriate amount of toothpaste for task  progressing 3/12/2024    6) Pt will tie his shoes with minimal verbal cues   progressing 3/12/2024    7) Pt will prepare a cold meal from start to finish using appropriately portioned ingredients with minimal verbal cues   progressing 3/12/2024    8) New goal 3/8/2024: Patient will be able to copy 2 sentences from a book without skipping words with 80% legibility.   progressing 3/12/2024         LongTerm Goals (12 weeks) Status When Last Assessed  Progressing/ Met   1) Pt will increase his Box and Blocks score as evidenced by 10 block increase bilaterally to increase gross motor coordination R: 26  L: 21 progressing 3/12/2024    2) Pt will increase fine motor coordination as evidenced by completion of 9 Hole Peg test in 40 seconds or less bilaterally   R: 54s  L: 53s progressing 3/12/2024    3) Pt will increase  strength as evidenced by 10# increase on dynamometer bilaterally R: 46.3#  L: 63.3# progressing 3/12/2024    4) Pt will use butter knife appropriately with no safety concerns during meal preparation 5/5 attempts  progressing 3/12/2024    5)  Pt will perform UBD / LBD independently, including buttons, fasteners, tying shoes, and with correct orientation of clothing items   progressing 3/12/2024    6) Pts parent will self report increased efficiency with toilet hygiene   progressing 3/12/2024     Additional functional goals to be added as patient progresses and per his priorities.    Plan   Certification Period/Plan of care expiration: 3/12/2024 to 5/28/2024.    Outpatient Occupational Therapy 2 times weekly for 12 weeks to  include the following interventions: Patient Education, Self Care, Therapeutic Activities, and Therapeutic Exercise.    Updates/Grading for next session: continue handwriting, upgrade UBE, in-hand manipulation,  strength, shoe tying     DANNIE Cowan   3/12/2024    I certify that I was present in the room directing the student in service delivery and guiding them using my skilled judgment. As the co-signing therapist I have reviewed the students documentation and am responsible for the treatment, assessment, and plan.   ERWIN Douglas, LOTR  3/12/2024      23-Sep-2021 03:41

## 2024-03-19 RX ORDER — METOPROLOL SUCCINATE 50 MG/1
50 TABLET, EXTENDED RELEASE ORAL DAILY
Qty: 90 | Refills: 1 | Status: ACTIVE | COMMUNITY
Start: 2022-07-21 | End: 1900-01-01

## 2024-03-19 RX ORDER — ATORVASTATIN CALCIUM 40 MG/1
40 TABLET, FILM COATED ORAL DAILY
Qty: 90 | Refills: 0 | Status: ACTIVE | COMMUNITY
Start: 2022-09-12 | End: 1900-01-01

## 2024-03-19 RX ORDER — SPIRONOLACTONE 25 MG/1
25 TABLET ORAL DAILY
Qty: 90 | Refills: 0 | Status: ACTIVE | COMMUNITY
Start: 2022-09-12 | End: 1900-01-01

## 2024-07-08 ENCOUNTER — RX RENEWAL (OUTPATIENT)
Age: 59
End: 2024-07-08

## 2024-08-12 ENCOUNTER — RX RENEWAL (OUTPATIENT)
Age: 59
End: 2024-08-12

## 2024-08-30 ENCOUNTER — APPOINTMENT (OUTPATIENT)
Dept: ELECTROPHYSIOLOGY | Facility: CLINIC | Age: 59
End: 2024-08-30

## 2024-08-30 ENCOUNTER — APPOINTMENT (OUTPATIENT)
Dept: CARDIOLOGY | Facility: CLINIC | Age: 59
End: 2024-08-30
Payer: COMMERCIAL

## 2024-08-30 VITALS
SYSTOLIC BLOOD PRESSURE: 137 MMHG | HEIGHT: 70 IN | BODY MASS INDEX: 33.21 KG/M2 | HEART RATE: 72 BPM | DIASTOLIC BLOOD PRESSURE: 85 MMHG | OXYGEN SATURATION: 100 % | WEIGHT: 232 LBS

## 2024-08-30 DIAGNOSIS — I48.91 UNSPECIFIED ATRIAL FIBRILLATION: ICD-10-CM

## 2024-08-30 DIAGNOSIS — I10 ESSENTIAL (PRIMARY) HYPERTENSION: ICD-10-CM

## 2024-08-30 DIAGNOSIS — I42.9 CARDIOMYOPATHY, UNSPECIFIED: ICD-10-CM

## 2024-08-30 PROCEDURE — 99214 OFFICE O/P EST MOD 30 MIN: CPT | Mod: 25

## 2024-08-30 PROCEDURE — 93000 ELECTROCARDIOGRAM COMPLETE: CPT

## 2024-08-30 NOTE — HISTORY OF PRESENT ILLNESS
[FreeTextEntry1] : Note from 2022:   56 year old male that presents for a cardiovascular assessment and evaluation. He carries a past medical history as outlined below:  -Hx of new onset atrial fibrillation with RVR-> September 2021 -Hx of HFrEF -Hx of LVEF 25% -Hx of normal CTA  Hospitalized last September with new onset atrial fibrillation and heart failure-> S/P GENE/DCCV Placed on Eliquis, Metoprolol and loaded on Amiodarone at that time.  Most recent echo/GENE: September 27, 2021  Tethered mitral valve leaflets with normal opening MIldly dilated LA Severe global LV systolic dysfunction RV enlargement with decreased RV systolic function Tethered tricuspid valve sEvere TR  Cardiac CT: September 28, 2021 Normal CTA of the coronaries Bilateral pleural effusions and lower lob passive atelectasis RV may be enlarged.   Patient has been lost to follow up. He stopped taking all medications when they "ran out" . He has not been on any medication since late October 2021.  Blood pressure today:  146/91 EKG- atrial fibrillation @ 108 bpm   Patient reports that he becomes short of breath when he "hurries", denies any chest pain and denies symptomatic palpitations.   Mr. Gray reports that he occasionally wakes from sleep and "jumps up" to catch his breath.    Interval note 8/30/2024:  57 y/o male seen today for followup. Last follow up note from 2022, seen by Dr. Hamm 2/2023, seeks follow up care today and prescription  renewal.   AS per Dr. Hamm, Despite cardioversion on amiodarone he had recurrence of arrhythmia at follow up visit and on 12/22/22 returned to the EP lab for ablation, where pulmonary vein isolation and cavotricuspid isthmus ablation were performed. He returned or follow up, noting improved energy and lessened SOB. He has no CP, dizziness, palpitations or syncope. He denies unexplained fever or dysphagia. He has had normal access site healing. He ran out of amiodarone about a week ago. At the time patient saw Dr. Hamm 2023  TTE and event monitor recommended but not done at the time.  No recent labwork.  Prescriptions for Lasix 80 mg daily and  Entresto tablets not refilled for 2 months, continues:  Eliquis 5 mg bid, Atorvastatin 40 mg daily, Metoprolol ER 50 mg daily, Spironalactone 25 mg daily  Past health history -Hx of atrial fibrillation with RVR-> September 2021, currently NSR on EKG  -Hx of HFrEF, non ischemic CM  -Hx of LVEF 25% -Hx of normal CTA  BP today: 137/85, HR 72 EKG: NSR, no ischemic changes GENE: 8/2022: normal MV, No LA appendage thrombus, LV systolic dysfunction, limited EF eval,  EF 20%, Severe RA enlargement, mild TR Last labwork noted 2021, repeat labs ordered Remains active: walking daily, works at least 5 days/week;  Eating well balanced low fat     -.

## 2024-08-30 NOTE — HISTORY OF PRESENT ILLNESS
[FreeTextEntry1] : Note from 2022:   56 year old male that presents for a cardiovascular assessment and evaluation. He carries a past medical history as outlined below:  -Hx of new onset atrial fibrillation with RVR-> September 2021 -Hx of HFrEF -Hx of LVEF 25% -Hx of normal CTA  Hospitalized last September with new onset atrial fibrillation and heart failure-> S/P GENE/DCCV Placed on Eliquis, Metoprolol and loaded on Amiodarone at that time.  Most recent echo/GENE: September 27, 2021  Tethered mitral valve leaflets with normal opening MIldly dilated LA Severe global LV systolic dysfunction RV enlargement with decreased RV systolic function Tethered tricuspid valve sEvere TR  Cardiac CT: September 28, 2021 Normal CTA of the coronaries Bilateral pleural effusions and lower lob passive atelectasis RV may be enlarged.   Patient has been lost to follow up. He stopped taking all medications when they "ran out" . He has not been on any medication since late October 2021.  Blood pressure today:  146/91 EKG- atrial fibrillation @ 108 bpm   Patient reports that he becomes short of breath when he "hurries", denies any chest pain and denies symptomatic palpitations.   Mr. Gray reports that he occasionally wakes from sleep and "jumps up" to catch his breath.    Interval note 8/30/2024:  59 y/o male seen today for followup. Last follow up note from 2022, seen by Dr. Hamm 2/2023, seeks follow up care today and prescription  renewal.   AS per Dr. Hamm, Despite cardioversion on amiodarone he had recurrence of arrhythmia at follow up visit and on 12/22/22 returned to the EP lab for ablation, where pulmonary vein isolation and cavotricuspid isthmus ablation were performed. He returned or follow up, noting improved energy and lessened SOB. He has no CP, dizziness, palpitations or syncope. He denies unexplained fever or dysphagia. He has had normal access site healing. He ran out of amiodarone about a week ago. At the time patient saw Dr. Hamm 2023  TTE and event monitor recommended but not done at the time.  No recent labwork.  Prescriptions for Lasix 80 mg daily and  Entresto tablets not refilled for 2 months, continues:  Eliquis 5 mg bid, Atorvastatin 40 mg daily, Metoprolol ER 50 mg daily, Spironalactone 25 mg daily  Past health history -Hx of atrial fibrillation with RVR-> September 2021, currently NSR on EKG  -Hx of HFrEF, non ischemic CM  -Hx of LVEF 25% -Hx of normal CTA  BP today: 137/85, HR 72 EKG: NSR, no ischemic changes GENE: 8/2022: normal MV, No LA appendage thrombus, LV systolic dysfunction, limited EF eval,  EF 20%, Severe RA enlargement, mild TR Last labwork noted 2021, repeat labs ordered Remains active: walking daily, works at least 5 days/week;  Eating well balanced low fat     -.

## 2024-08-30 NOTE — DISCUSSION/SUMMARY
[FreeTextEntry1] : In summary,  is a 59 y/o male seen today for followup. Last follow up note from 2022, seen by Dr. Hamm 2/2023, seeks follow up care today and prescription  renewal.   AS per Dr. Hamm, Despite cardioversion on amiodarone he had recurrence of arrhythmia at follow up visit and on 12/22/22 returned to the EP lab for ablation, where pulmonary vein isolation and cavotricuspid isthmus ablation were performed. He returned or follow up, noting improved energy and lessened SOB. He has no CP, dizziness, palpitations or syncope. He denies unexplained fever or dysphagia. He has had normal access site healing. He ran out of amiodarone about a week ago. At the time patient saw Dr. Hamm 2023  TTE and event monitor recommended but not done at the time.  No recent labwork.  Prescriptions for Lasix 80 mg daily and  Entresto tablets not refilled for 2 months, continues:  Eliquis 5 mg bid, Atorvastatin 40 mg daily, Metoprolol ER 50 mg daily, Spironalactone 25 mg daily  Past health history -Hx of atrial fibrillation with RVR-> September 2021, currently NSR on EKG  -Hx of HFrEF, non ischemic CM  -Hx of LVEF 25% -Hx of normal CTA  BP today: 137/85, HR 72 EKG: NSR, no ischemic changes GENE: 8/2022: normal MV, No LA appendage thrombus, LV systolic dysfunction, limited EF eval,  EF 20%, Severe RA enlargement, mild TR Last labwork noted 2021, repeat labs ordered Remains active: walking daily, works at least 5 days/week;  Eating well balanced low fat  # Non ischemic cardiomyopathy with hx of afib now NSR -continues current meds as above -TTE and Event monitor: assess LV function and rhythm -continues lifestyle modifications:  - Encouraged patient to participate in  healthy walking and exercise (when cleared by OB)  and eating habits, focusing on a Mediterranean style of eating and aiming for the recommended 150 minutes per week of moderate physical activity.  - Encouraged the patient to find healthy outlets and coping mechanisms to help manage stress, such as physical activity/exercise, reducing workload if possible, spending time with family and friends, engaging in an enjoyable hobby, or using meditation or mindfulness techniques.    - the importance of medication compliance was stressed - especially with eliquis  - Encouraged patient to continue healthy exercise and eating habits, focusing on a Mediterranean style of eating and aiming for the recommended 150 minutes per week of moderate physical activity. - Encouraged the patient to find healthy outlets and coping mechanisms to help manage stress, such as physical activity/exercise, reducing workload if possible, spending time with family and friends, engaging in an enjoyable hobby, or using meditation or mindfulness techniques.      [EKG obtained to assist in diagnosis and management of assessed problem(s)] : EKG obtained to assist in diagnosis and management of assessed problem(s)

## 2024-08-30 NOTE — DISCUSSION/SUMMARY
[FreeTextEntry1] : In summary,  is a 57 y/o male seen today for followup. Last follow up note from 2022, seen by Dr. Hamm 2/2023, seeks follow up care today and prescription  renewal.   AS per Dr. Hamm, Despite cardioversion on amiodarone he had recurrence of arrhythmia at follow up visit and on 12/22/22 returned to the EP lab for ablation, where pulmonary vein isolation and cavotricuspid isthmus ablation were performed. He returned or follow up, noting improved energy and lessened SOB. He has no CP, dizziness, palpitations or syncope. He denies unexplained fever or dysphagia. He has had normal access site healing. He ran out of amiodarone about a week ago. At the time patient saw Dr. Hamm 2023  TTE and event monitor recommended but not done at the time.  No recent labwork.  Prescriptions for Lasix 80 mg daily and  Entresto tablets not refilled for 2 months, continues:  Eliquis 5 mg bid, Atorvastatin 40 mg daily, Metoprolol ER 50 mg daily, Spironalactone 25 mg daily  Past health history -Hx of atrial fibrillation with RVR-> September 2021, currently NSR on EKG  -Hx of HFrEF, non ischemic CM  -Hx of LVEF 25% -Hx of normal CTA  BP today: 137/85, HR 72 EKG: NSR, no ischemic changes GENE: 8/2022: normal MV, No LA appendage thrombus, LV systolic dysfunction, limited EF eval,  EF 20%, Severe RA enlargement, mild TR Last labwork noted 2021, repeat labs ordered Remains active: walking daily, works at least 5 days/week;  Eating well balanced low fat  # Non ischemic cardiomyopathy with hx of afib now NSR -continues current meds as above -TTE and Event monitor: assess LV function and rhythm -continues lifestyle modifications:  - Encouraged patient to participate in  healthy walking and exercise (when cleared by OB)  and eating habits, focusing on a Mediterranean style of eating and aiming for the recommended 150 minutes per week of moderate physical activity.  - Encouraged the patient to find healthy outlets and coping mechanisms to help manage stress, such as physical activity/exercise, reducing workload if possible, spending time with family and friends, engaging in an enjoyable hobby, or using meditation or mindfulness techniques.    - the importance of medication compliance was stressed - especially with eliquis  - Encouraged patient to continue healthy exercise and eating habits, focusing on a Mediterranean style of eating and aiming for the recommended 150 minutes per week of moderate physical activity. - Encouraged the patient to find healthy outlets and coping mechanisms to help manage stress, such as physical activity/exercise, reducing workload if possible, spending time with family and friends, engaging in an enjoyable hobby, or using meditation or mindfulness techniques.      [EKG obtained to assist in diagnosis and management of assessed problem(s)] : EKG obtained to assist in diagnosis and management of assessed problem(s)

## 2024-12-06 ENCOUNTER — APPOINTMENT (OUTPATIENT)
Dept: CV DIAGNOSITCS | Facility: HOSPITAL | Age: 59
End: 2024-12-06

## 2024-12-06 ENCOUNTER — OUTPATIENT (OUTPATIENT)
Dept: OUTPATIENT SERVICES | Facility: HOSPITAL | Age: 59
LOS: 1 days | End: 2024-12-06

## 2024-12-06 DIAGNOSIS — Z98.890 OTHER SPECIFIED POSTPROCEDURAL STATES: Chronic | ICD-10-CM

## 2024-12-06 DIAGNOSIS — D15.1 BENIGN NEOPLASM OF HEART: Chronic | ICD-10-CM

## 2024-12-06 DIAGNOSIS — I42.9 CARDIOMYOPATHY, UNSPECIFIED: ICD-10-CM

## 2024-12-06 PROCEDURE — 93306 TTE W/DOPPLER COMPLETE: CPT

## 2024-12-06 PROCEDURE — C8929: CPT

## 2024-12-13 ENCOUNTER — APPOINTMENT (OUTPATIENT)
Dept: CARDIOLOGY | Facility: CLINIC | Age: 59
End: 2024-12-13
Payer: COMMERCIAL

## 2024-12-13 VITALS
SYSTOLIC BLOOD PRESSURE: 133 MMHG | OXYGEN SATURATION: 96 % | BODY MASS INDEX: 35.22 KG/M2 | HEIGHT: 70 IN | DIASTOLIC BLOOD PRESSURE: 83 MMHG | HEART RATE: 71 BPM | WEIGHT: 246 LBS

## 2024-12-13 DIAGNOSIS — I10 ESSENTIAL (PRIMARY) HYPERTENSION: ICD-10-CM

## 2024-12-13 DIAGNOSIS — E78.5 HYPERLIPIDEMIA, UNSPECIFIED: ICD-10-CM

## 2024-12-13 DIAGNOSIS — E66.9 OBESITY, UNSPECIFIED: ICD-10-CM

## 2024-12-13 DIAGNOSIS — I42.9 CARDIOMYOPATHY, UNSPECIFIED: ICD-10-CM

## 2024-12-13 PROCEDURE — G2211 COMPLEX E/M VISIT ADD ON: CPT | Mod: NC

## 2024-12-13 PROCEDURE — 93000 ELECTROCARDIOGRAM COMPLETE: CPT

## 2024-12-13 PROCEDURE — 99214 OFFICE O/P EST MOD 30 MIN: CPT

## 2024-12-18 ENCOUNTER — RESULT REVIEW (OUTPATIENT)
Age: 59
End: 2024-12-18

## 2024-12-18 ENCOUNTER — OUTPATIENT (OUTPATIENT)
Dept: OUTPATIENT SERVICES | Facility: HOSPITAL | Age: 59
LOS: 1 days | End: 2024-12-18
Payer: COMMERCIAL

## 2024-12-18 DIAGNOSIS — I25.10 ATHEROSCLEROTIC HEART DISEASE OF NATIVE CORONARY ARTERY WITHOUT ANGINA PECTORIS: ICD-10-CM

## 2024-12-18 DIAGNOSIS — D15.1 BENIGN NEOPLASM OF HEART: Chronic | ICD-10-CM

## 2024-12-18 DIAGNOSIS — Z98.890 OTHER SPECIFIED POSTPROCEDURAL STATES: Chronic | ICD-10-CM

## 2024-12-18 PROCEDURE — C8929: CPT

## 2024-12-18 PROCEDURE — 93306 TTE W/DOPPLER COMPLETE: CPT | Mod: 26

## 2025-03-12 ENCOUNTER — APPOINTMENT (OUTPATIENT)
Dept: CARDIOLOGY | Facility: CLINIC | Age: 60
End: 2025-03-12

## 2025-06-04 ENCOUNTER — RX RENEWAL (OUTPATIENT)
Age: 60
End: 2025-06-04

## 2025-08-22 ENCOUNTER — RX RENEWAL (OUTPATIENT)
Age: 60
End: 2025-08-22

## 2025-08-25 RX ORDER — SPIRONOLACTONE 25 MG/1
25 TABLET ORAL
Qty: 30 | Refills: 3 | Status: ACTIVE | COMMUNITY
Start: 2025-08-22 | End: 1900-01-01